# Patient Record
Sex: FEMALE | Race: WHITE | Employment: OTHER | URBAN - METROPOLITAN AREA
[De-identification: names, ages, dates, MRNs, and addresses within clinical notes are randomized per-mention and may not be internally consistent; named-entity substitution may affect disease eponyms.]

---

## 2018-09-25 ENCOUNTER — OFFICE VISIT (OUTPATIENT)
Dept: FAMILY MEDICINE CLINIC | Facility: CLINIC | Age: 63
End: 2018-09-25
Payer: COMMERCIAL

## 2018-09-25 ENCOUNTER — LAB ENCOUNTER (OUTPATIENT)
Dept: LAB | Facility: REFERENCE LAB | Age: 63
End: 2018-09-25
Attending: FAMILY MEDICINE
Payer: COMMERCIAL

## 2018-09-25 VITALS
WEIGHT: 109 LBS | OXYGEN SATURATION: 96 % | HEART RATE: 66 BPM | DIASTOLIC BLOOD PRESSURE: 60 MMHG | BODY MASS INDEX: 19.56 KG/M2 | HEIGHT: 62.5 IN | SYSTOLIC BLOOD PRESSURE: 88 MMHG

## 2018-09-25 DIAGNOSIS — M54.6 CHRONIC THORACIC BACK PAIN, UNSPECIFIED BACK PAIN LATERALITY: ICD-10-CM

## 2018-09-25 DIAGNOSIS — Z00.00 ROUTINE MEDICAL EXAM: Primary | ICD-10-CM

## 2018-09-25 DIAGNOSIS — Z00.00 ROUTINE MEDICAL EXAM: ICD-10-CM

## 2018-09-25 DIAGNOSIS — G89.29 CHRONIC THORACIC BACK PAIN, UNSPECIFIED BACK PAIN LATERALITY: ICD-10-CM

## 2018-09-25 DIAGNOSIS — D17.22 LIPOMA OF LEFT UPPER EXTREMITY: ICD-10-CM

## 2018-09-25 DIAGNOSIS — Z78.0 POSTMENOPAUSAL: ICD-10-CM

## 2018-09-25 DIAGNOSIS — D17.24 LIPOMA OF LEFT LOWER EXTREMITY: ICD-10-CM

## 2018-09-25 DIAGNOSIS — R39.15 URINARY URGENCY: ICD-10-CM

## 2018-09-25 DIAGNOSIS — M25.472 EDEMA OF LEFT ANKLE: ICD-10-CM

## 2018-09-25 DIAGNOSIS — R42 DIZZINESS: ICD-10-CM

## 2018-09-25 LAB
ALBUMIN SERPL BCP-MCNC: 4.7 G/DL (ref 3.5–4.8)
ALBUMIN/GLOB SERPL: 1.7 {RATIO} (ref 1–2)
ALP SERPL-CCNC: 43 U/L (ref 32–100)
ALT SERPL-CCNC: 18 U/L (ref 14–54)
ANION GAP SERPL CALC-SCNC: 6 MMOL/L (ref 0–18)
AST SERPL-CCNC: 21 U/L (ref 15–41)
BASOPHILS # BLD: 0.1 K/UL (ref 0–0.2)
BASOPHILS NFR BLD: 1 %
BILIRUB SERPL-MCNC: 0.7 MG/DL (ref 0.3–1.2)
BUN SERPL-MCNC: 10 MG/DL (ref 8–20)
BUN/CREAT SERPL: 22.2 (ref 10–20)
CALCIUM SERPL-MCNC: 9.8 MG/DL (ref 8.5–10.5)
CHLORIDE SERPL-SCNC: 105 MMOL/L (ref 95–110)
CHOLEST SERPL-MCNC: 213 MG/DL (ref 110–200)
CO2 SERPL-SCNC: 29 MMOL/L (ref 22–32)
CREAT SERPL-MCNC: 0.45 MG/DL (ref 0.5–1.5)
EOSINOPHIL # BLD: 0.1 K/UL (ref 0–0.7)
EOSINOPHIL NFR BLD: 2 %
ERYTHROCYTE [DISTWIDTH] IN BLOOD BY AUTOMATED COUNT: 13.2 % (ref 11–15)
GLOBULIN PLAS-MCNC: 2.7 G/DL (ref 2.5–3.7)
GLUCOSE SERPL-MCNC: 88 MG/DL (ref 70–99)
HCT VFR BLD AUTO: 39.4 % (ref 35–48)
HDLC SERPL-MCNC: 93 MG/DL
HGB BLD-MCNC: 13.3 G/DL (ref 12–16)
LDLC SERPL CALC-MCNC: 102 MG/DL (ref 0–99)
LYMPHOCYTES # BLD: 1.9 K/UL (ref 1–4)
LYMPHOCYTES NFR BLD: 42 %
MCH RBC QN AUTO: 31.2 PG (ref 27–32)
MCHC RBC AUTO-ENTMCNC: 33.6 G/DL (ref 32–37)
MCV RBC AUTO: 92.9 FL (ref 80–100)
MONOCYTES # BLD: 0.4 K/UL (ref 0–1)
MONOCYTES NFR BLD: 10 %
NEUTROPHILS # BLD AUTO: 2.1 K/UL (ref 1.8–7.7)
NEUTROPHILS NFR BLD: 45 %
NONHDLC SERPL-MCNC: 120 MG/DL
OSMOLALITY UR CALC.SUM OF ELEC: 288 MOSM/KG (ref 275–295)
PATIENT FASTING: YES
PLATELET # BLD AUTO: 231 K/UL (ref 140–400)
PMV BLD AUTO: 10.1 FL (ref 7.4–10.3)
POTASSIUM SERPL-SCNC: 5.2 MMOL/L (ref 3.3–5.1)
PROT SERPL-MCNC: 7.4 G/DL (ref 5.9–8.4)
RBC # BLD AUTO: 4.25 M/UL (ref 3.7–5.4)
SODIUM SERPL-SCNC: 140 MMOL/L (ref 136–144)
TRIGL SERPL-MCNC: 88 MG/DL (ref 1–149)
TSH SERPL-ACNC: 1.2 UIU/ML (ref 0.45–5.33)
WBC # BLD AUTO: 4.6 K/UL (ref 4–11)

## 2018-09-25 PROCEDURE — 80050 GENERAL HEALTH PANEL: CPT | Performed by: FAMILY MEDICINE

## 2018-09-25 PROCEDURE — 36415 COLL VENOUS BLD VENIPUNCTURE: CPT | Performed by: FAMILY MEDICINE

## 2018-09-25 PROCEDURE — 99386 PREV VISIT NEW AGE 40-64: CPT | Performed by: FAMILY MEDICINE

## 2018-09-25 PROCEDURE — 80061 LIPID PANEL: CPT | Performed by: FAMILY MEDICINE

## 2018-09-25 PROCEDURE — 83036 HEMOGLOBIN GLYCOSYLATED A1C: CPT | Performed by: FAMILY MEDICINE

## 2018-09-25 RX ORDER — ESTRADIOL 0.1 MG/G
2 CREAM VAGINAL
COMMUNITY
Start: 2017-09-19

## 2018-09-25 NOTE — PROGRESS NOTES
Jasmine Sampson is a 66year old female. Patient presents with:  Physical: questions/ lump on left arm       HPI:     Found me online  Had seen Dr. Alcides Rapp and another doc Looking for a natural approach.    Growth on left upper arm  Also on left la • Other (Other) Father    • Heart Disorder Mother    • Diabetes Mother    • Other (Other) Mother    • Heart Disorder Maternal Grandfather    • Other (Other) Paternal Grandfather        IMMUNIZATION HISTORY:     There is no immunization history on file for Physical Exam   Constitutional: She is oriented to person, place, and time and well-developed, well-nourished, and in no distress. No distress. HENT:   Head: Normocephalic and atraumatic.    Right Ear: External ear normal.   Left Ear: External ear normal. Patient was referred for pelvic floor therapy for further management of symptoms. DEXA scan ordered to evaluate bone density    Given further recommendations as below.     Orders Placed This Visit:  No orders of the defined types were placed in this encoun Discussed with Patient the Following:  · Monthly breast self-exam  · Breast cancer screening/ mammograms and clinical breast exams  · Cervical cancer screening/ pap smears  · Healthy diet including adequate intake of vegetables and fruits, appropriate port

## 2018-09-26 ENCOUNTER — TELEPHONE (OUTPATIENT)
Dept: FAMILY MEDICINE CLINIC | Facility: CLINIC | Age: 63
End: 2018-09-26

## 2018-09-26 LAB — HBA1C MFR BLD: 5.3 % (ref 4–6)

## 2018-10-15 ENCOUNTER — HOSPITAL ENCOUNTER (OUTPATIENT)
Dept: BONE DENSITY | Facility: HOSPITAL | Age: 63
Discharge: HOME | End: 2018-10-15
Attending: FAMILY MEDICINE
Payer: COMMERCIAL

## 2018-10-15 DIAGNOSIS — Z78.0 POSTMENOPAUSAL: ICD-10-CM

## 2018-10-15 DIAGNOSIS — Z00.00 ROUTINE MEDICAL EXAM: ICD-10-CM

## 2018-10-15 PROCEDURE — 77080 DXA BONE DENSITY AXIAL: CPT | Performed by: FAMILY MEDICINE

## 2018-12-04 ENCOUNTER — OFFICE VISIT (OUTPATIENT)
Dept: FAMILY MEDICINE CLINIC | Facility: CLINIC | Age: 63
End: 2018-12-04
Payer: COMMERCIAL

## 2018-12-04 VITALS
WEIGHT: 108 LBS | DIASTOLIC BLOOD PRESSURE: 76 MMHG | BODY MASS INDEX: 19.38 KG/M2 | HEART RATE: 80 BPM | HEIGHT: 62.5 IN | SYSTOLIC BLOOD PRESSURE: 98 MMHG | OXYGEN SATURATION: 98 %

## 2018-12-04 DIAGNOSIS — R68.89 COLD INTOLERANCE OF HAND: ICD-10-CM

## 2018-12-04 DIAGNOSIS — M54.9 CHRONIC NECK AND BACK PAIN: Primary | ICD-10-CM

## 2018-12-04 DIAGNOSIS — R09.82 PND (POST-NASAL DRIP): ICD-10-CM

## 2018-12-04 DIAGNOSIS — M54.2 CHRONIC NECK AND BACK PAIN: Primary | ICD-10-CM

## 2018-12-04 DIAGNOSIS — G89.29 CHRONIC NECK AND BACK PAIN: Primary | ICD-10-CM

## 2018-12-04 DIAGNOSIS — R53.82 CHRONIC FATIGUE: ICD-10-CM

## 2018-12-04 PROCEDURE — 99214 OFFICE O/P EST MOD 30 MIN: CPT | Performed by: FAMILY MEDICINE

## 2018-12-04 NOTE — PROGRESS NOTES
Js Mcdaniel is a 66year old female. Patient presents with:  Pain: kneck right pain is worse, but today ok       HPI:     Work has been busy - understaffed, a recent move. Having continued neck pain off and on. Doesn't know what is going on.    Feel Substance and Sexual Activity      Alcohol use: Yes        Comment: occ      Drug use: No      Sexual activity: Not on file    Other Topics      Concerns:        Caffeine Concern: Not Asked        Exercise: Yes        Seat Belt: Yes        Special Diet: - REVERSE T3, SERUM; Future    4. PND (post-nasal drip)    Patient was referred for physical therapy to manage symptoms further. Further labs ordered as above. Evaluate for marker for dysbiosis. Ferritin level ordered.  Low ferritin can impact energy le The products and items listed below (the “Products”)  and their claims have not been evaluated by the Food and Drug Administration. Dietary products are not intended to treat, prevent, mitigate or cure disease.  Ultimately, you must draw your own conclusion - multiple websites online: [employer] Bio-d-mulsion Forte (liquid)    I recommend taking 2899-3402 IU daily ongoing    Coral Legend Plus by Ho Goncalves  Why: For healthy skin, nails and hair  Directions: 2 capsules twice daily    Where: h

## 2018-12-04 NOTE — PATIENT INSTRUCTIONS
The products and items listed below (the “Products”)  and their claims have not been evaluated by the Food and Drug Administration. Dietary products are not intended to treat, prevent, mitigate or cure disease.  Ultimately, you must draw your own conclusion Bio-d-mulsion Forte (liquid)    I recommend taking 7812-4898 IU daily ongoing    Coral Legend Plus by Ho Goncalves  Why: For healthy skin, nails and hair  Directions: 2 capsules twice daily    Where: [email or web address]-legend-[email or web address]

## 2018-12-05 ENCOUNTER — APPOINTMENT (OUTPATIENT)
Dept: LAB | Facility: REFERENCE LAB | Age: 63
End: 2018-12-05
Attending: FAMILY MEDICINE
Payer: COMMERCIAL

## 2018-12-05 DIAGNOSIS — R68.89 COLD INTOLERANCE OF HAND: ICD-10-CM

## 2018-12-05 DIAGNOSIS — R53.82 CHRONIC FATIGUE: ICD-10-CM

## 2018-12-05 PROCEDURE — 84481 FREE ASSAY (FT-3): CPT | Performed by: FAMILY MEDICINE

## 2018-12-05 PROCEDURE — 84482 T3 REVERSE: CPT | Performed by: FAMILY MEDICINE

## 2018-12-05 PROCEDURE — 86376 MICROSOMAL ANTIBODY EACH: CPT | Performed by: FAMILY MEDICINE

## 2018-12-05 PROCEDURE — 86628 CANDIDA ANTIBODY: CPT | Performed by: FAMILY MEDICINE

## 2018-12-05 PROCEDURE — 82728 ASSAY OF FERRITIN: CPT | Performed by: FAMILY MEDICINE

## 2018-12-05 PROCEDURE — 84439 ASSAY OF FREE THYROXINE: CPT | Performed by: FAMILY MEDICINE

## 2018-12-05 PROCEDURE — 86800 THYROGLOBULIN ANTIBODY: CPT | Performed by: FAMILY MEDICINE

## 2018-12-05 PROCEDURE — 36415 COLL VENOUS BLD VENIPUNCTURE: CPT | Performed by: FAMILY MEDICINE

## 2018-12-06 ENCOUNTER — NURSE ONLY (OUTPATIENT)
Dept: FAMILY MEDICINE CLINIC | Facility: CLINIC | Age: 63
End: 2018-12-06

## 2018-12-18 ENCOUNTER — OFFICE VISIT (OUTPATIENT)
Dept: PHYSICAL THERAPY | Age: 63
End: 2018-12-18
Attending: FAMILY MEDICINE
Payer: COMMERCIAL

## 2018-12-18 DIAGNOSIS — M54.9 CHRONIC NECK AND BACK PAIN: ICD-10-CM

## 2018-12-18 DIAGNOSIS — M54.2 CHRONIC NECK AND BACK PAIN: ICD-10-CM

## 2018-12-18 DIAGNOSIS — G89.29 CHRONIC NECK AND BACK PAIN: ICD-10-CM

## 2018-12-18 PROCEDURE — 97530 THERAPEUTIC ACTIVITIES: CPT | Performed by: PHYSICAL THERAPIST

## 2018-12-18 PROCEDURE — 97112 NEUROMUSCULAR REEDUCATION: CPT | Performed by: PHYSICAL THERAPIST

## 2018-12-18 PROCEDURE — 97161 PT EVAL LOW COMPLEX 20 MIN: CPT | Performed by: PHYSICAL THERAPIST

## 2018-12-18 NOTE — PROGRESS NOTES
CERVICAL SPINE/UPPER EXTREMITY EVALUATION:   Referring Physician: Wiliam Marrufo DO    Diagnosis: Chronic neck and back pain (M54.2,M54.9,G89.29) Date of Service: 12/18/2018   Date of Onset: 1 year ago       SUBJECTIVE:   PATIENT SUMMARY:  Mickie Runner Night Pain, Unexplained Weight Loss, Fever or Chills N   Lower Extremity Neurological Deficit N   Vision Changes/Double Vision N   Headaches Y; when symptomatic   Chest Pain or Palpitations, SOB N   Dizziness, weakness, numbness and tingling Dizziness wi facilitation   Therapeutic Activity  Patient education on anatomy, pathophysiology, plan of care     Discussed findings with patient.  Provided patient education on: anatomy, pathophysiology, plan of care, therapy progression and home exercise program.    C [phone]. Sincerely,  Electronically signed by: Mason Hu PT, DPT, FAAOMPT, OCS          Physician’s certification required: Yes  I certify the need for these services furnished under this plan of treatment and while under my care.     X______

## 2018-12-24 ENCOUNTER — OFFICE VISIT (OUTPATIENT)
Dept: PHYSICAL THERAPY | Age: 63
End: 2018-12-24
Attending: FAMILY MEDICINE
Payer: COMMERCIAL

## 2018-12-24 PROCEDURE — 97112 NEUROMUSCULAR REEDUCATION: CPT | Performed by: PHYSICAL THERAPIST

## 2018-12-24 PROCEDURE — 97530 THERAPEUTIC ACTIVITIES: CPT | Performed by: PHYSICAL THERAPIST

## 2018-12-24 PROCEDURE — 97140 MANUAL THERAPY 1/> REGIONS: CPT | Performed by: PHYSICAL THERAPIST

## 2018-12-24 NOTE — PROGRESS NOTES
Name: Constance Dunlap  Date: 12/24/2018  Dx: Chronic neck and back pain (M54.2,M54.9,G89.29)           Authorized # of Visits:  12         Next MD visit: none scheduled  Fall Risk: standard         Precautions: n/a           Medication Changes since last v return patient to prior level of function.       Charges: 1 TA, 1 NR, 1 MT       Total Timed Treatment: 45 min  Total Treatment Time: 45 min

## 2018-12-31 ENCOUNTER — OFFICE VISIT (OUTPATIENT)
Dept: PHYSICAL THERAPY | Age: 63
End: 2018-12-31
Attending: FAMILY MEDICINE
Payer: COMMERCIAL

## 2018-12-31 PROCEDURE — 97140 MANUAL THERAPY 1/> REGIONS: CPT | Performed by: PHYSICAL THERAPIST

## 2018-12-31 PROCEDURE — 97112 NEUROMUSCULAR REEDUCATION: CPT | Performed by: PHYSICAL THERAPIST

## 2018-12-31 NOTE — PROGRESS NOTES
Name: Ronak Trejo  Date: 12/31/2018  Dx: Chronic neck and back pain (M54.2,M54.9,G89.29)           Authorized # of Visits:  12         Next MD visit: none scheduled  Fall Risk: standard         Precautions: n/a           Medication Changes since last v Patient will demonstrate appropriate posture and body mechanics for lifting and carrying boxes or groceries <10 pounds. 4. Patient to improve FOTO outcomes score to less than or equal to 20% impairment, for functional improvement in ADLs.  Currently 46% im

## 2019-01-03 ENCOUNTER — OFFICE VISIT (OUTPATIENT)
Dept: PHYSICAL THERAPY | Age: 64
End: 2019-01-03
Attending: FAMILY MEDICINE
Payer: COMMERCIAL

## 2019-01-03 PROCEDURE — 97112 NEUROMUSCULAR REEDUCATION: CPT | Performed by: PHYSICAL THERAPIST

## 2019-01-03 PROCEDURE — 97140 MANUAL THERAPY 1/> REGIONS: CPT | Performed by: PHYSICAL THERAPIST

## 2019-01-03 NOTE — PROGRESS NOTES
Name: Darlin Proud  Date: 1/3/2019   Dx: Chronic neck and back pain (M54.2,M54.9,G89.29)           Authorized # of Visits:  12         Next MD visit: none scheduled  Fall Risk: standard         Precautions: n/a           Medication Changes since last vi aide with symptom management and return to previous level of function.    2. Patient will demonstrate posture correction with ears, shoulders and hips aligned for improved spine position with ADLs and occupational tasks.   3. Patient will demonstrate approp

## 2019-01-07 ENCOUNTER — OFFICE VISIT (OUTPATIENT)
Dept: PHYSICAL THERAPY | Age: 64
End: 2019-01-07
Attending: FAMILY MEDICINE
Payer: COMMERCIAL

## 2019-01-07 PROCEDURE — 97140 MANUAL THERAPY 1/> REGIONS: CPT | Performed by: PHYSICAL THERAPIST

## 2019-01-07 PROCEDURE — 97112 NEUROMUSCULAR REEDUCATION: CPT | Performed by: PHYSICAL THERAPIST

## 2019-01-07 NOTE — PROGRESS NOTES
Name: Cordell Gist  Date: 1/7/2019   Dx: Chronic neck and back pain (M54.2,M54.9,G89.29)           Authorized # of Visits:  12         Next MD visit: none scheduled  Fall Risk: standard         Precautions: n/a           Medication Changes since last vi continues to require mild cues for posture and shoulder position, including cues to decrease trunk extension. Patient was successful with cues and exercises at mirror. Patient was able to demonstrate components of HEP with modifications.  Patient verbalized

## 2019-01-10 ENCOUNTER — OFFICE VISIT (OUTPATIENT)
Dept: PHYSICAL THERAPY | Age: 64
End: 2019-01-10
Attending: FAMILY MEDICINE
Payer: COMMERCIAL

## 2019-01-10 PROCEDURE — 97530 THERAPEUTIC ACTIVITIES: CPT | Performed by: PHYSICAL THERAPIST

## 2019-01-10 PROCEDURE — 97112 NEUROMUSCULAR REEDUCATION: CPT | Performed by: PHYSICAL THERAPIST

## 2019-01-10 NOTE — PROGRESS NOTES
Name: Elda Snyder  Date: 1/10/2019   Dx: Chronic neck and back pain (M54.2,M54.9,G89.29)           Authorized # of Visits:  12         Next MD visit: none scheduled  Fall Risk: standard         Precautions: n/a           Medication Changes since last v spine and scapulae. Added scapular stability/mobility exercises. Patient was successful with cues, but required mild range modifications due to discomfort. Patient was able to demonstrate components of HEP with modifications for success.      Goals:   Long

## 2019-01-14 ENCOUNTER — OFFICE VISIT (OUTPATIENT)
Dept: PHYSICAL THERAPY | Age: 64
End: 2019-01-14
Attending: FAMILY MEDICINE
Payer: COMMERCIAL

## 2019-01-14 PROCEDURE — 97112 NEUROMUSCULAR REEDUCATION: CPT | Performed by: PHYSICAL THERAPIST

## 2019-01-14 PROCEDURE — 97140 MANUAL THERAPY 1/> REGIONS: CPT | Performed by: PHYSICAL THERAPIST

## 2019-01-14 NOTE — PROGRESS NOTES
Name: Marisa Credit  Date: 1/14/2019   Dx: Chronic neck and back pain (M54.2,M54.9,G89.29)           Authorized # of Visits:  12         Next MD visit: none scheduled  Fall Risk: standard         Precautions: n/a           Medication Changes since last v Assessment: Focused on interventions to restore cervical spine and scapular mechanics. Provided interventions to improve spine and scapular mechanics, reviewed posture training and benefits. Patient was successful with cues.  Patient was able to [facility]

## 2019-01-16 ENCOUNTER — OFFICE VISIT (OUTPATIENT)
Dept: FAMILY MEDICINE CLINIC | Facility: CLINIC | Age: 64
End: 2019-01-16
Payer: COMMERCIAL

## 2019-01-16 VITALS
DIASTOLIC BLOOD PRESSURE: 58 MMHG | WEIGHT: 109 LBS | HEIGHT: 62.5 IN | BODY MASS INDEX: 19.56 KG/M2 | OXYGEN SATURATION: 98 % | HEART RATE: 70 BPM | SYSTOLIC BLOOD PRESSURE: 92 MMHG

## 2019-01-16 DIAGNOSIS — R68.89 COLD INTOLERANCE OF HAND: ICD-10-CM

## 2019-01-16 DIAGNOSIS — M54.2 CHRONIC NECK AND BACK PAIN: Primary | ICD-10-CM

## 2019-01-16 DIAGNOSIS — R53.82 CHRONIC FATIGUE: ICD-10-CM

## 2019-01-16 DIAGNOSIS — R09.82 PND (POST-NASAL DRIP): ICD-10-CM

## 2019-01-16 DIAGNOSIS — G89.29 CHRONIC NECK AND BACK PAIN: Primary | ICD-10-CM

## 2019-01-16 DIAGNOSIS — M54.9 CHRONIC NECK AND BACK PAIN: Primary | ICD-10-CM

## 2019-01-16 PROCEDURE — 99214 OFFICE O/P EST MOD 30 MIN: CPT | Performed by: FAMILY MEDICINE

## 2019-01-16 NOTE — PATIENT INSTRUCTIONS
The products and items listed below (the “Products”)  and their claims have not been evaluated by the Food and Drug Administration. Dietary products are not intended to treat, prevent, mitigate or cure disease.  Ultimately, you must draw your own conclusion daily    L-Carnitine by Ensley Byes    Directions: 2 capsules twice daily  Where: [employer] or [employer]    Omega 3 fatty acids are anti-inflammatory and important for brain and nervous system health, including memory. I recommend taking 2000 mg daily. sauce  Horseradish  Ketchup  Mayonnaise  Soy sauce  White vinegar  REFINED/PROCESSED FATS & OILS  Canola oil  Fake ‘butter’ spreads  Margarine  Soybean oil  Sunflower oil  SUGARS & SUGAR SUBSTITUTES  Agave  Aspartame  Cane sugar  Corn syrup  Honey  Maple s and pseudo-grains like buckwheat, millet, and quinoa contain high amounts of fiber, excellent for keeping your digestive system moving and eliminating Candida toxins. These are great substitutes when your recipes call for wheat, rye, or rice.   Some product spices have antioxidant and antifungal properties. They can also improve circulation and reduce inflammation. They’re great for livening up food if you’re on a Candida diet. Coconut aminos are an excellent alternative to soy sauce.  And you can use apple c be minimized or eliminated from your diet at first, but can be included in small amounts as you progress through your treatment.   Fruits  These fruits have low glycemic loads and are much less likely to spike your blood sugar than foods like bananas or gra fruits dramatically increases their sweetness – just look at how much sweeter raisins are compared to grapes. Another key fruit product to avoid is fruit juice.  Without the natural fiber that slows down your absorption of sugar, fruit juices have an almos source may be OK, but we would recommend avoiding it during your Candida diet. Processed meats like lunch meat and spam are loaded with dextrose, nitrates, sulfates, and sugars.  These can worsen a gut imbalance like Candida, weaken your immune system, str from your diet until you have recovered from your Candida overgrowth. If you do choose to use them in a recipe, make sure they are soaked overnight or sprayed with Grapefruit Seed Extract before you use them.  Also remember to avoid nut butters made from th overgrowth. Be careful with artificial sweeteners like aspartame too. It can weaken your immune system and, in some studies, aspartame has been shown to raise blood glucose just like sugar. Not to mention that it might a carcinogen too.   There are healthi Choice)  • Nuts and seeds (almonds, walnuts, pecans, macadamia nuts and pumpkin seeds)  • Nut butter packets (almond, pecan, macadamia nuts—[employer] makes individual packs)  • Coconut butter packets ([employer] brand is great)   • Whole food or raw food prot

## 2019-01-16 NOTE — PROGRESS NOTES
Luis Grayson is a 60year old female. Patient presents with: Follow - Up: physical therapy, and labs       HPI:     Has been working with PT- has been seeing improvements. Current therapist is leaving the practice.    Tightness across the throat is b Substance and Sexual Activity      Alcohol use: Yes        Comment: occ      Drug use: No      Sexual activity: Not on file    Other Topics      Concerns:        Caffeine Concern: Not Asked        Exercise: Yes        Seat Belt: Yes        Special Diet: The products and items listed below (the “Products”)  and their claims have not been evaluated by the Food and Drug Administration. Dietary products are not intended to treat, prevent, mitigate or cure disease.  Ultimately, you must draw your own conclusion Directions: 1 capsule daily    L-Carnitine by Katharina Jimenez    Directions: 2 capsules twice daily  Where: Whole Foods or [employer]    Omega 3 fatty acids are anti-inflammatory and important for brain and nervous system health, including memory.  I recommend t Barbecue sauce  Horseradish  Ketchup  Mayonnaise  Soy sauce  White vinegar  REFINED/PROCESSED FATS & OILS  Canola oil  Fake ‘butter’ spreads  Margarine  Soybean oil  Sunflower oil  SUGARS & SUGAR SUBSTITUTES  Agave  Aspartame  Cane sugar  Corn syrup  Honey Grains and pseudo-grains like buckwheat, millet, and quinoa contain high amounts of fiber, excellent for keeping your digestive system moving and eliminating Candida toxins. These are great substitutes when your recipes call for wheat, rye, or rice.   Some Many herbs and spices have antioxidant and antifungal properties. They can also improve circulation and reduce inflammation. They’re great for livening up food if you’re on a Candida diet. Coconut aminos are an excellent alternative to soy sauce.  And you These include the more starchy vegetables like carrots, beans, and potatoes. These should be minimized or eliminated from your diet at first, but can be included in small amounts as you progress through your treatment.   Fruits  These fruits have low glycem It’s particularly important to avoid dried fruits like raisins or dried cranberries. The act of drying fruits dramatically increases their sweetness – just look at how much sweeter raisins are compared to grapes.   Another key fruit product to avoid is frui Pork contains retroviruses and parasites that may survive cooking and be harmful for those with a weakened digestive system. Also remember that pork often comes in an over-cooked form (i.e. bobby!) that is full of carcinogenic compounds.  Properly cooked po This group of nuts contains high amounts of mold, which can potentially irritate your gut. Candida sufferers also tend to have a higher sensitivity to mold, which can lead to inflammation and an immune reaction (just like mold in your home).   The nuts that Olive oil is a great choice, and an antifungal food too, but be aware that there are lots of fake olive oils out there. You might buy your olive oil in a reputable store, but that doesn’t necessarily mean that it’s 100% olive oil.  Other, cheaper oils like Drinking large amounts of alcohol can lead to a temporary drop in your blood sugar, something that many people don’t realize. On the other hand, drinking moderate amounts of alcohol tends to increase your blood sugar.  Either way, destabilizing your blood s

## 2019-01-17 ENCOUNTER — OFFICE VISIT (OUTPATIENT)
Dept: PHYSICAL THERAPY | Age: 64
End: 2019-01-17
Attending: FAMILY MEDICINE
Payer: COMMERCIAL

## 2019-01-17 PROCEDURE — 97112 NEUROMUSCULAR REEDUCATION: CPT | Performed by: PHYSICAL THERAPIST

## 2019-01-17 PROCEDURE — 97140 MANUAL THERAPY 1/> REGIONS: CPT | Performed by: PHYSICAL THERAPIST

## 2019-01-17 NOTE — PROGRESS NOTES
PHYSICAL THERAPY DISCHARGE REPORT  Name: Lalita Haney  Date: 1/17/2019   Dx: Chronic neck and back pain (M54.2,M54.9,G89.29)           Authorized # of Visits:  12         Next MD visit: none scheduled  Fall Risk: standard         Precautions: n/a rotation   - DCF chin tucks by patient with hold  - DCF facilitation in quadruped with developmental sequence  - quadruped hand heel rocking 2x10  - telescoping arms 2x8  - arm circles 2x8  - Reviewed posture training - DCF chin tucks by patient with hold

## 2019-03-20 ENCOUNTER — OFFICE VISIT (OUTPATIENT)
Dept: FAMILY MEDICINE CLINIC | Facility: CLINIC | Age: 64
End: 2019-03-20
Payer: COMMERCIAL

## 2019-03-20 VITALS
DIASTOLIC BLOOD PRESSURE: 60 MMHG | WEIGHT: 108 LBS | HEART RATE: 72 BPM | OXYGEN SATURATION: 92 % | HEIGHT: 62.5 IN | BODY MASS INDEX: 19.38 KG/M2 | SYSTOLIC BLOOD PRESSURE: 80 MMHG

## 2019-03-20 DIAGNOSIS — R53.82 CHRONIC FATIGUE: Primary | ICD-10-CM

## 2019-03-20 DIAGNOSIS — R42 DIZZINESS: ICD-10-CM

## 2019-03-20 DIAGNOSIS — B37.9 CANDIDIASIS: ICD-10-CM

## 2019-03-20 DIAGNOSIS — Z78.0 POSTMENOPAUSAL: ICD-10-CM

## 2019-03-20 DIAGNOSIS — R68.89 COLD INTOLERANCE OF HAND: ICD-10-CM

## 2019-03-20 PROCEDURE — 99214 OFFICE O/P EST MOD 30 MIN: CPT | Performed by: FAMILY MEDICINE

## 2019-03-20 NOTE — PROGRESS NOTES
Jens Landeros is a 60year old female. Patient presents with: Follow - Up: wants labs, wants to know about vacanations       HPI:     Started the recommendations from last time. Drinking a vegan protein shake. Having meat some times to help.    Brai Smoking status: Never Smoker      Smokeless tobacco: Never Used    Substance and Sexual Activity      Alcohol use: Yes        Comment: occ      Drug use: No      Sexual activity: Not on file    Lifestyle      Physical activity:        Days per week: No Neurological: She is alert and oriented to person, place, and time. No cranial nerve deficit. Gait normal.   Skin: Skin is warm and dry. Psychiatric: Affect normal.       ASSESSMENT AND PLAN:       1. Chronic fatigue    2. Cold intolerance of hand    3. Increase sour, salty, and pungent taste      Return in about 3 months (around 6/20/2019) for Integrative Medicine - Established (30 min). Patient affirmed understanding of plan and all questions were answered.      Loree Ramirez, DO

## 2019-06-11 ENCOUNTER — OFFICE VISIT (OUTPATIENT)
Dept: FAMILY MEDICINE CLINIC | Facility: CLINIC | Age: 64
End: 2019-06-11
Payer: COMMERCIAL

## 2019-06-11 ENCOUNTER — TELEPHONE (OUTPATIENT)
Dept: FAMILY MEDICINE CLINIC | Facility: CLINIC | Age: 64
End: 2019-06-11

## 2019-06-11 VITALS
BODY MASS INDEX: 18.66 KG/M2 | OXYGEN SATURATION: 99 % | HEIGHT: 62.5 IN | WEIGHT: 104 LBS | SYSTOLIC BLOOD PRESSURE: 100 MMHG | HEART RATE: 66 BPM | DIASTOLIC BLOOD PRESSURE: 60 MMHG

## 2019-06-11 DIAGNOSIS — M54.12 CERVICAL RADICULAR PAIN: ICD-10-CM

## 2019-06-11 DIAGNOSIS — S29.012A UPPER BACK STRAIN, INITIAL ENCOUNTER: Primary | ICD-10-CM

## 2019-06-11 PROCEDURE — 99213 OFFICE O/P EST LOW 20 MIN: CPT | Performed by: FAMILY MEDICINE

## 2019-06-11 RX ORDER — BACLOFEN 10 MG/1
10 TABLET ORAL 3 TIMES DAILY PRN
Qty: 30 TABLET | Refills: 0 | Status: SHIPPED | OUTPATIENT
Start: 2019-06-11 | End: 2019-10-01

## 2019-06-11 NOTE — TELEPHONE ENCOUNTER
Patient requesting an call back in regard to having pain an arm and shoulder states having tingling in arm and hand in fingers (left)

## 2019-06-11 NOTE — PROGRESS NOTES
Cordell Sanchez is a 60year old female. Patient presents with:  Arm Pain: c/o left arm pain.    Shoulder Pain: c/o left shoulder pain with numbness      HPI:     Was reaching for a kleenex with her left arm and then started to get a cold sensation into he Tobacco Use      Smoking status: Never Smoker      Smokeless tobacco: Never Used    Substance and Sexual Activity      Alcohol use: Yes        Comment: occ      Drug use: No      Sexual activity: Not on file    Lifestyle      Physical activity:        Da Restriction of upper back motion with TTP and reproduction of tingling with palpation. Neurological: She is alert and oriented to person, place, and time. No cranial nerve deficit. Gait normal.   Skin: Skin is warm and dry.    Psychiatric: Affect normal. Where: Whole [employer] or [employer]        Return in about 6 weeks (around 7/23/2019) for Follow Up (15 min). Patient affirmed understanding of plan and all questions were answered.      Mac Morin, DO

## 2019-06-18 ENCOUNTER — OFFICE VISIT (OUTPATIENT)
Dept: FAMILY MEDICINE CLINIC | Facility: CLINIC | Age: 64
End: 2019-06-18
Payer: COMMERCIAL

## 2019-06-18 VITALS
BODY MASS INDEX: 18.66 KG/M2 | DIASTOLIC BLOOD PRESSURE: 60 MMHG | SYSTOLIC BLOOD PRESSURE: 90 MMHG | HEART RATE: 81 BPM | WEIGHT: 104 LBS | HEIGHT: 62.5 IN | OXYGEN SATURATION: 99 %

## 2019-06-18 DIAGNOSIS — Z78.0 POSTMENOPAUSAL: ICD-10-CM

## 2019-06-18 DIAGNOSIS — S29.012A UPPER BACK STRAIN, INITIAL ENCOUNTER: ICD-10-CM

## 2019-06-18 DIAGNOSIS — M54.12 CERVICAL RADICULAR PAIN: ICD-10-CM

## 2019-06-18 DIAGNOSIS — B37.9 CANDIDIASIS: ICD-10-CM

## 2019-06-18 DIAGNOSIS — R53.82 CHRONIC FATIGUE: Primary | ICD-10-CM

## 2019-06-18 DIAGNOSIS — R42 DIZZINESS: ICD-10-CM

## 2019-06-18 DIAGNOSIS — R68.89 COLD INTOLERANCE OF HAND: ICD-10-CM

## 2019-06-18 PROCEDURE — 99214 OFFICE O/P EST MOD 30 MIN: CPT | Performed by: FAMILY MEDICINE

## 2019-06-18 NOTE — PROGRESS NOTES
Army Huizar is a 60year old female. Patient presents with: Integrative Medicine Consult      HPI:     Has been doing HEP  Not using baclofen  Did not start the PT. Still some numbness in her finger.    Hands are still always cold, cold weather woul Inability: Not on file      Transportation needs:        Medical: Not on file        Non-medical: Not on file    Tobacco Use      Smoking status: Never Smoker      Smokeless tobacco: Never Used    Substance and Sexual Activity      Alcohol use:  Yes Musculoskeletal: She exhibits no edema. Neurological: She is alert and oriented to person, place, and time. No cranial nerve deficit. Gait normal.   Skin: Skin is warm and dry. Psychiatric: Affect normal.       ASSESSMENT AND PLAN:       1.  Chronic fat Directions: 1 capsule twice daily  Where: [email or web address]/products/natto-k    2. DESBIO Detox II    Directions: 5 drops under the tongue 3 times daily  [email or web address] [email or web address]          Return in about 3 months (around 9/18/2019).     Patient Aicha He

## 2019-09-11 ENCOUNTER — OFFICE VISIT (OUTPATIENT)
Dept: GASTROENTEROLOGY | Facility: CLINIC | Age: 64
End: 2019-09-11
Payer: COMMERCIAL

## 2019-09-11 VITALS
WEIGHT: 107 LBS | DIASTOLIC BLOOD PRESSURE: 66 MMHG | BODY MASS INDEX: 19.69 KG/M2 | SYSTOLIC BLOOD PRESSURE: 102 MMHG | HEART RATE: 66 BPM | HEIGHT: 62 IN

## 2019-09-11 DIAGNOSIS — Z12.12 SCREENING FOR COLORECTAL CANCER: Primary | ICD-10-CM

## 2019-09-11 DIAGNOSIS — Z12.11 SCREENING FOR COLORECTAL CANCER: Primary | ICD-10-CM

## 2019-09-11 PROCEDURE — 99202 OFFICE O/P NEW SF 15 MIN: CPT | Performed by: INTERNAL MEDICINE

## 2019-09-11 RX ORDER — KRILL/OM-3/DHA/EPA/PHOSPHO/AST 500-110 MG
CAPSULE ORAL DAILY
COMMUNITY
End: 2019-10-01

## 2019-09-11 NOTE — PATIENT INSTRUCTIONS
Schedule screening colonoscopy following a split dose Suprep and either IV sedation or monitored anesthesia care per scheduling.

## 2019-09-12 ENCOUNTER — TELEPHONE (OUTPATIENT)
Dept: GASTROENTEROLOGY | Facility: CLINIC | Age: 64
End: 2019-09-12

## 2019-09-12 NOTE — TELEPHONE ENCOUNTER
Scheduled for:  Colonoscopy - 75980  Provider Name:  Dr. Yeny Kendall  Date:  11/8/19  Location:  [facility]  Sedation:  MAC  Time:  (pt is aware that [facility] will call with arrival time)  Prep:  Suprep, Prep instructions were given to pt in the office, pt verbalized [place]

## 2019-09-14 NOTE — PROGRESS NOTES
HPI:    Patient ID: Cordell Sanchez is a 60year old female. HPI  The patient is self referred for a discussion regarding colorectal cancer screening. She had a normal colonoscopy in 2009 at [facility]. The patient feels \"okay\". HENT:   Head: Normocephalic and atraumatic. Mouth/Throat: No oropharyngeal exudate. Eyes: Conjunctivae are normal. No scleral icterus. Neck: Neck supple. No thyromegaly present. Cardiovascular: Normal rate, regular rhythm and normal heart sounds. RDW      11.0 - 15.0 % 13.2   Platelet Count      248 - 400 K/   MEAN PLATELET VOLUME      7.4 - 10.3 fL 10.1   Neutrophils %      % 45   Lymphocytes %      % 42   Monocytes %      % 10   Eosinophils %      % 2   Basophils %      % 1   Neutrophils

## 2019-09-24 ENCOUNTER — OFFICE VISIT (OUTPATIENT)
Dept: INTEGRATIVE MEDICINE | Facility: CLINIC | Age: 64
End: 2019-09-24
Payer: COMMERCIAL

## 2019-09-24 ENCOUNTER — HOSPITAL ENCOUNTER (OUTPATIENT)
Dept: GENERAL RADIOLOGY | Age: 64
Discharge: HOME | End: 2019-09-24
Attending: PHYSICIAN ASSISTANT
Payer: COMMERCIAL

## 2019-09-24 VITALS
BODY MASS INDEX: 19.91 KG/M2 | WEIGHT: 108.19 LBS | SYSTOLIC BLOOD PRESSURE: 100 MMHG | OXYGEN SATURATION: 98 % | TEMPERATURE: 98 F | HEART RATE: 61 BPM | DIASTOLIC BLOOD PRESSURE: 60 MMHG | HEIGHT: 62 IN

## 2019-09-24 DIAGNOSIS — R07.81 RIB PAIN: ICD-10-CM

## 2019-09-24 DIAGNOSIS — M89.8X9 ABNORMAL BONE FORMATION: ICD-10-CM

## 2019-09-24 DIAGNOSIS — R07.81 RIB PAIN: Primary | ICD-10-CM

## 2019-09-24 PROBLEM — Z00.00 ROUTINE MEDICAL EXAM: Status: RESOLVED | Noted: 2018-09-25 | Resolved: 2019-09-24

## 2019-09-24 PROCEDURE — 99214 OFFICE O/P EST MOD 30 MIN: CPT | Performed by: PHYSICIAN ASSISTANT

## 2019-09-24 PROCEDURE — 71046 X-RAY EXAM CHEST 2 VIEWS: CPT | Performed by: PHYSICIAN ASSISTANT

## 2019-09-24 NOTE — PROGRESS NOTES
Pop Wheat is a 60year old female. Patient presents with:  Pain: lump and pain on the L side of chest area. HPI:   Pain in chest which hurt to breath and cough. Taking Advil. Bump on left side of chest. Pain travels to left armpit.  Thought it daily. Disp:  Rfl:    Magnesium 100 MG Oral Tab Take 400 mg by mouth daily. Disp:  Rfl:    B Complex Vitamins (VITAMIN B COMPLEX OR) Inject as directed.  Disp:  Rfl:    Na Sulfate-K Sulfate-Mg Sulf (SUPREP BOWEL PREP KIT) 17.5-3.13-1.6 GM/177ML Oral Solutio Forced sexual activity: Not on file    Other Topics      Concerns:        Caffeine Concern: Not Asked        Exercise: Yes        Seat Belt: Yes        Special Diet: Yes        Stress Concern: Yes        Weight Concern: No    Social History Narrative (CPT=71046); Future      Will do x-ray to rule out fracture. Continue advil PRN for pain. Discussed with patient that if not fracture could be muscle strain or inflammation of costal cartilage. Given further recommendations as below.     Orders Pl

## 2019-09-26 NOTE — PROGRESS NOTES
Herve Clayton,     Your x-ray did not show any fractures. Since there is no fracture, most likely the pain is from muscle strain and/or inflammation of the cartilage around the ribs. Continue Advil as needed for the pain.  The pain should continue to improve ove

## 2019-10-01 ENCOUNTER — APPOINTMENT (OUTPATIENT)
Dept: LAB | Facility: REFERENCE LAB | Age: 64
End: 2019-10-01
Attending: FAMILY MEDICINE
Payer: COMMERCIAL

## 2019-10-01 ENCOUNTER — OFFICE VISIT (OUTPATIENT)
Dept: INTEGRATIVE MEDICINE | Facility: CLINIC | Age: 64
End: 2019-10-01
Payer: COMMERCIAL

## 2019-10-01 ENCOUNTER — HOSPITAL ENCOUNTER (OUTPATIENT)
Dept: GENERAL RADIOLOGY | Age: 64
Discharge: HOME | End: 2019-10-01
Attending: FAMILY MEDICINE
Payer: COMMERCIAL

## 2019-10-01 VITALS
OXYGEN SATURATION: 97 % | HEART RATE: 72 BPM | SYSTOLIC BLOOD PRESSURE: 90 MMHG | WEIGHT: 105.81 LBS | DIASTOLIC BLOOD PRESSURE: 54 MMHG | BODY MASS INDEX: 18.98 KG/M2 | HEIGHT: 62.5 IN

## 2019-10-01 DIAGNOSIS — S29.011D MUSCLE STRAIN OF CHEST WALL, SUBSEQUENT ENCOUNTER: ICD-10-CM

## 2019-10-01 DIAGNOSIS — R07.81 RIB PAIN: Primary | ICD-10-CM

## 2019-10-01 DIAGNOSIS — R20.2 PARESTHESIA OF BILATERAL LEGS: ICD-10-CM

## 2019-10-01 DIAGNOSIS — M89.8X9 BONY GROWTH: ICD-10-CM

## 2019-10-01 DIAGNOSIS — R07.81 RIB PAIN: ICD-10-CM

## 2019-10-01 DIAGNOSIS — H91.92 HEARING LOSS OF LEFT EAR, UNSPECIFIED HEARING LOSS TYPE: ICD-10-CM

## 2019-10-01 PROCEDURE — 82607 VITAMIN B-12: CPT | Performed by: FAMILY MEDICINE

## 2019-10-01 PROCEDURE — 99214 OFFICE O/P EST MOD 30 MIN: CPT | Performed by: FAMILY MEDICINE

## 2019-10-01 PROCEDURE — 71100 X-RAY EXAM RIBS UNI 2 VIEWS: CPT | Performed by: FAMILY MEDICINE

## 2019-10-01 PROCEDURE — 36415 COLL VENOUS BLD VENIPUNCTURE: CPT | Performed by: FAMILY MEDICINE

## 2019-10-01 PROCEDURE — 84207 ASSAY OF VITAMIN B-6: CPT | Performed by: FAMILY MEDICINE

## 2019-10-01 RX ORDER — CHLORAL HYDRATE 500 MG
1 CAPSULE ORAL 2 TIMES DAILY
COMMUNITY
End: 2019-11-26

## 2019-10-01 NOTE — PROGRESS NOTES
Priyanka Gerard is a 60year old female. Patient presents with: Follow - Up: 3 mos - still having L upper chest/rib pain      HPI:     Having Lt upper chest and rib pain for the past several weeks. The pain ebbs and flows.  Has discomfort wearing a seatbe Years of education: Not on file      Highest education level: Not on file    Occupational History      Not on file    Social Needs      Financial resource strain: Not on file      Food insecurity:        Worry: Not on file        Inability: Not on file Constitutional: She is oriented to person, place, and time and well-developed, well-nourished, and in no distress. HENT:   Head: Normocephalic and atraumatic.    Right Ear: External ear normal.   Left Ear: External ear normal.   Eyes: Pupils are equal, ro The products and items listed below (the “Products”)  and their claims have not been evaluated by the Food and Drug Administration. Dietary products are not intended to treat, prevent, mitigate or cure disease.  Ultimately, you must draw your own conclusion

## 2019-11-08 ENCOUNTER — LAB REQUISITION (OUTPATIENT)
Dept: LAB | Facility: HOSPITAL | Age: 64
End: 2019-11-08
Payer: COMMERCIAL

## 2019-11-08 ENCOUNTER — SURGERY CENTER DOCUMENTATION (OUTPATIENT)
Dept: SURGERY | Age: 64
End: 2019-11-08

## 2019-11-08 DIAGNOSIS — Z01.89 ENCOUNTER FOR OTHER SPECIFIED SPECIAL EXAMINATIONS: ICD-10-CM

## 2019-11-08 PROCEDURE — 88305 TISSUE EXAM BY PATHOLOGIST: CPT | Performed by: INTERNAL MEDICINE

## 2019-11-08 PROCEDURE — 45380 COLONOSCOPY AND BIOPSY: CPT | Performed by: INTERNAL MEDICINE

## 2019-11-08 NOTE — PROCEDURES
[address] Endoscopy Report      Date of Procedure:  11/08/19      Preoperative Diagnosis:  Colorectal cancer screening      Postoperative Diagnosis:  Diminutive transverse colon polyp      Procedure:    Colonoscopy with cold biops hyperplastic respectively.         Austen Mota MD  11/8/2019

## 2019-11-19 ENCOUNTER — TELEPHONE (OUTPATIENT)
Dept: GASTROENTEROLOGY | Facility: CLINIC | Age: 64
End: 2019-11-19

## 2019-11-19 NOTE — TELEPHONE ENCOUNTER
Notes recorded by Madi Neves RN on 11/12/2019 at [address] [facility] maintenance updated. Unable to put in a  10 year recall due to new upgrade.   Message printed.  ------    Notes recorded by Christine Martinez MD on 11/11/2019 at 6:59 PM CST  I s

## 2019-11-19 NOTE — TELEPHONE ENCOUNTER
10  Year colonoscopy recall placed in system per Dr. Verduzco Alert   . Colonoscopy done on 11/08/19   . Next due on 11/08/29 . Snapshot updated.

## 2019-11-26 ENCOUNTER — OFFICE VISIT (OUTPATIENT)
Dept: INTEGRATIVE MEDICINE | Facility: CLINIC | Age: 64
End: 2019-11-26
Payer: COMMERCIAL

## 2019-11-26 VITALS
WEIGHT: 106.38 LBS | DIASTOLIC BLOOD PRESSURE: 60 MMHG | HEIGHT: 62.25 IN | BODY MASS INDEX: 19.33 KG/M2 | HEART RATE: 70 BPM | SYSTOLIC BLOOD PRESSURE: 104 MMHG | OXYGEN SATURATION: 99 %

## 2019-11-26 DIAGNOSIS — R39.15 URINARY URGENCY: ICD-10-CM

## 2019-11-26 DIAGNOSIS — Z78.0 POSTMENOPAUSAL: ICD-10-CM

## 2019-11-26 DIAGNOSIS — R79.0 LOW FERRITIN: ICD-10-CM

## 2019-11-26 DIAGNOSIS — L65.9 HAIR LOSS: ICD-10-CM

## 2019-11-26 DIAGNOSIS — Z00.00 ROUTINE MEDICAL EXAM: Primary | ICD-10-CM

## 2019-11-26 PROCEDURE — 99396 PREV VISIT EST AGE 40-64: CPT | Performed by: FAMILY MEDICINE

## 2019-11-26 NOTE — PROGRESS NOTES
Elsie Elliott is a 60year old female. Patient presents with:  Physical      HPI:     Chest pain is feeling good, resolved  Still will get leg swelling. Will feel it in clothes. Get tight. Also with an odd sensation in her legs.    Plans to move in t • Heart Disorder Mother    • Diabetes Mother    • Other (Other) Mother    • Cancer Maternal Grandmother         breast cancer   • Heart Disorder Maternal Grandfather    • Cancer Paternal Grandfather         stomach cancer   • Other (Other) Paternal Nolon Binning Fear of current or ex partner: Not on file        Emotionally abused: Not on file        Physically abused: Not on file        Forced sexual activity: Not on file    Other Topics      Concerns:        Caffeine Concern: Not Asked        Exercise:  Yes Psychiatric: Mood, memory, affect and judgment normal.       ASSESSMENT AND PLAN:   1. Routine medical exam  - CBC WITH DIFFERENTIAL WITH PLATELET; Future  - COMP METABOLIC PANEL (14); Future  - DRAKE IGG/A/M AB PANEL;  Future  - HEMOGLOBIN A1C; Future  - The products and items listed below (the “Products”)  and their claims have not been evaluated by the Food and Drug Administration. Dietary products are not intended to treat, prevent, mitigate or cure disease.  Ultimately, you must draw your own conclusion Return in about 3 months (around 2/26/2020) for Integrative Medicine - Established (30 min) - hair loss.     Discussed with Patient the Following:  · Monthly breast self-exam  · Breast cancer screening/ mammograms and clinical breast exams  · Cervical cance

## 2019-11-26 NOTE — PATIENT INSTRUCTIONS
I have complete gwen in the body's ability to heal and transform. The products and items listed below (the “Products”)  and their claims have not been evaluated by the Food and Drug Administration.  Dietary products are not intended to treat, prevent, m

## 2020-01-06 ENCOUNTER — OFFICE VISIT (OUTPATIENT)
Dept: AUDIOLOGY | Facility: CLINIC | Age: 65
End: 2020-01-06
Payer: COMMERCIAL

## 2020-01-06 DIAGNOSIS — H90.3 SNHL (SENSORY-NEURAL HEARING LOSS), ASYMMETRICAL: Primary | ICD-10-CM

## 2020-01-06 PROCEDURE — 92567 TYMPANOMETRY: CPT | Performed by: AUDIOLOGIST

## 2020-01-06 PROCEDURE — 92557 COMPREHENSIVE HEARING TEST: CPT | Performed by: AUDIOLOGIST

## 2020-01-06 NOTE — PROGRESS NOTES
AUDIOLOGY REPORT      Pop Wheat is a 63year old female     Referring Provider: Homa Lopez   YOB: 1955  Medical Record: [id number]      Patient Hearing History:  Patient referred by Dr. Ana Sandoval for hearing loss.   Patient reports that s Estephanie. Otologic evaluation. Evaluate retrocochlear pathology. Monitor hearing sensitivity as needed.     1/6/2020  VICKEY Carlson

## 2020-01-08 NOTE — PROGRESS NOTES
Please provide patient with the name of an ENT physician to follow up with based on her hearing test results:  Dr. Veronique Paul  [address] [address] [phone]

## 2020-01-10 ENCOUNTER — OFFICE VISIT (OUTPATIENT)
Dept: OTOLARYNGOLOGY | Facility: CLINIC | Age: 65
End: 2020-01-10
Payer: COMMERCIAL

## 2020-01-10 VITALS
BODY MASS INDEX: 19.56 KG/M2 | DIASTOLIC BLOOD PRESSURE: 63 MMHG | SYSTOLIC BLOOD PRESSURE: 99 MMHG | WEIGHT: 109 LBS | HEIGHT: 62.5 IN | TEMPERATURE: 98 F

## 2020-01-10 DIAGNOSIS — H91.20 SUDDEN-ONSET SENSORINEURAL HEARING LOSS: Primary | ICD-10-CM

## 2020-01-10 PROCEDURE — 99243 OFF/OP CNSLTJ NEW/EST LOW 30: CPT | Performed by: OTOLARYNGOLOGY

## 2020-01-10 RX ORDER — PREDNISONE 10 MG/1
TABLET ORAL
Qty: 44 TABLET | Refills: 0 | Status: SHIPPED | OUTPATIENT
Start: 2020-01-10 | End: 2020-02-25

## 2020-01-10 NOTE — PROGRESS NOTES
Janice Worley is a 63year old female. Patient presents with:  Hearing Loss: left for a few months, audiogram done on 1-6-2020      HISTORY OF PRESENT ILLNESS  She presents with a 3 to 4-month history of decreased hearing on the left.   She states that t Other (Other) Mother    • Cancer Maternal Grandmother         breast cancer   • Heart Disorder Maternal Grandfather    • Cancer Paternal Grandfather         stomach cancer   • Other (Other) Paternal Grandfather    • Cancer Sister 48        breast, uterine Inspection - Right: Normal, Left: Normal. Canal - Right: Normal, Left: Normal. TM - Right: Normal, Left: Normal.   Skin Normal Inspection - Normal.        Lymph Detail Normal Submental. Submandibular. Anterior cervical. Posterior cervical. Supraclavicular.

## 2020-01-28 ENCOUNTER — OFFICE VISIT (OUTPATIENT)
Dept: OTOLARYNGOLOGY | Facility: CLINIC | Age: 65
End: 2020-01-28
Payer: COMMERCIAL

## 2020-01-28 ENCOUNTER — OFFICE VISIT (OUTPATIENT)
Dept: AUDIOLOGY | Facility: CLINIC | Age: 65
End: 2020-01-28
Payer: COMMERCIAL

## 2020-01-28 VITALS
TEMPERATURE: 98 F | WEIGHT: 109 LBS | SYSTOLIC BLOOD PRESSURE: 110 MMHG | HEIGHT: 62.5 IN | DIASTOLIC BLOOD PRESSURE: 64 MMHG | BODY MASS INDEX: 19.56 KG/M2

## 2020-01-28 DIAGNOSIS — H90.3 ASYMMETRICAL SENSORINEURAL HEARING LOSS: Primary | ICD-10-CM

## 2020-01-28 DIAGNOSIS — H91.93 BILATERAL HEARING LOSS, UNSPECIFIED HEARING LOSS TYPE: Primary | ICD-10-CM

## 2020-01-28 DIAGNOSIS — IMO0001 ASYMMETRICAL HEARING LOSS OF BOTH EARS: ICD-10-CM

## 2020-01-28 DIAGNOSIS — H91.20 SUDDEN-ONSET SENSORINEURAL HEARING LOSS: ICD-10-CM

## 2020-01-28 PROCEDURE — 99213 OFFICE O/P EST LOW 20 MIN: CPT | Performed by: OTOLARYNGOLOGY

## 2020-01-28 PROCEDURE — 92552 PURE TONE AUDIOMETRY AIR: CPT | Performed by: AUDIOLOGIST

## 2020-01-28 NOTE — PROGRESS NOTES
Lilian Higginbotham is a 63year old female.   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 Thyroid gland - Normal.   Eyes Normal Conjunctiva - Right: Normal, Left: Normal. Pupil - Right: Normal, Left: Normal. Fundus - Right: Normal, Left: Normal.   Neurological Normal Memory - Normal. Cranial nerves - Cranial nerves II through XI For that reason I have recommended an ABR and a repeat audiogram in 6 months. I will call her with the results of her ABR which she will do in the next few weeks. No real improvement in her hearing since her last study 2 weeks ago on steroids.   We did di

## 2020-02-04 ENCOUNTER — LAB ENCOUNTER (OUTPATIENT)
Dept: LAB | Facility: HOSPITAL | Age: 65
End: 2020-02-04
Attending: FAMILY MEDICINE
Payer: COMMERCIAL

## 2020-02-04 DIAGNOSIS — R79.0 LOW FERRITIN: ICD-10-CM

## 2020-02-04 DIAGNOSIS — Z00.00 ROUTINE MEDICAL EXAM: ICD-10-CM

## 2020-02-04 LAB
ALBUMIN SERPL-MCNC: 4.3 G/DL (ref 3.4–5)
ALBUMIN/GLOB SERPL: 1.3 {RATIO} (ref 1–2)
ALP LIVER SERPL-CCNC: 41 U/L (ref 50–130)
ALT SERPL-CCNC: 23 U/L (ref 13–56)
ANION GAP SERPL CALC-SCNC: 3 MMOL/L (ref 0–18)
AST SERPL-CCNC: 24 U/L (ref 15–37)
BASOPHILS # BLD AUTO: 0.05 X10(3) UL (ref 0–0.2)
BASOPHILS NFR BLD AUTO: 1.5 %
BILIRUB SERPL-MCNC: 0.7 MG/DL (ref 0.1–2)
BUN BLD-MCNC: 12 MG/DL (ref 7–18)
BUN/CREAT SERPL: 17.9 (ref 10–20)
CALCIUM BLD-MCNC: 9.3 MG/DL (ref 8.5–10.1)
CHLORIDE SERPL-SCNC: 108 MMOL/L (ref 98–112)
CHOLEST SMN-MCNC: 222 MG/DL (ref ?–200)
CO2 SERPL-SCNC: 30 MMOL/L (ref 21–32)
CREAT BLD-MCNC: 0.67 MG/DL (ref 0.55–1.02)
DEPRECATED HBV CORE AB SER IA-ACNC: 41.4 NG/ML (ref 18–340)
DEPRECATED RDW RBC AUTO: 43.1 FL (ref 35.1–46.3)
EOSINOPHIL # BLD AUTO: 0.08 X10(3) UL (ref 0–0.7)
EOSINOPHIL NFR BLD AUTO: 2.3 %
ERYTHROCYTE [DISTWIDTH] IN BLOOD BY AUTOMATED COUNT: 12.2 % (ref 11–15)
EST. AVERAGE GLUCOSE BLD GHB EST-MCNC: 103 MG/DL (ref 68–126)
GLOBULIN PLAS-MCNC: 3.3 G/DL (ref 2.8–4.4)
GLUCOSE BLD-MCNC: 87 MG/DL (ref 70–99)
HAV IGM SER QL: 2.2 MG/DL (ref 1.6–2.6)
HBA1C MFR BLD HPLC: 5.2 % (ref ?–5.7)
HCT VFR BLD AUTO: 39.6 % (ref 35–48)
HDLC SERPL-MCNC: 118 MG/DL (ref 40–59)
HGB BLD-MCNC: 12.9 G/DL (ref 12–16)
IMM GRANULOCYTES # BLD AUTO: 0 X10(3) UL (ref 0–1)
IMM GRANULOCYTES NFR BLD: 0 %
LDLC SERPL CALC-MCNC: 96 MG/DL (ref ?–100)
LYMPHOCYTES # BLD AUTO: 1.11 X10(3) UL (ref 1–4)
LYMPHOCYTES NFR BLD AUTO: 32.4 %
M PROTEIN MFR SERPL ELPH: 7.6 G/DL (ref 6.4–8.2)
MCH RBC QN AUTO: 31.5 PG (ref 26–34)
MCHC RBC AUTO-ENTMCNC: 32.6 G/DL (ref 31–37)
MCV RBC AUTO: 96.8 FL (ref 80–100)
MONOCYTES # BLD AUTO: 0.35 X10(3) UL (ref 0.1–1)
MONOCYTES NFR BLD AUTO: 10.2 %
NEUTROPHILS # BLD AUTO: 1.84 X10 (3) UL (ref 1.5–7.7)
NEUTROPHILS # BLD AUTO: 1.84 X10(3) UL (ref 1.5–7.7)
NEUTROPHILS NFR BLD AUTO: 53.6 %
NONHDLC SERPL-MCNC: 104 MG/DL (ref ?–130)
OSMOLALITY SERPL CALC.SUM OF ELEC: 291 MOSM/KG (ref 275–295)
PATIENT FASTING Y/N/NP: YES
PATIENT FASTING Y/N/NP: YES
PLATELET # BLD AUTO: 218 10(3)UL (ref 150–450)
POTASSIUM SERPL-SCNC: 4.3 MMOL/L (ref 3.5–5.1)
RBC # BLD AUTO: 4.09 X10(6)UL (ref 3.8–5.3)
SODIUM SERPL-SCNC: 141 MMOL/L (ref 136–145)
TRIGL SERPL-MCNC: 38 MG/DL (ref 30–149)
TSI SER-ACNC: 1.3 MIU/ML (ref 0.36–3.74)
VLDLC SERPL CALC-MCNC: 8 MG/DL (ref 0–30)
WBC # BLD AUTO: 3.4 X10(3) UL (ref 4–11)

## 2020-02-04 PROCEDURE — 80061 LIPID PANEL: CPT

## 2020-02-04 PROCEDURE — 36415 COLL VENOUS BLD VENIPUNCTURE: CPT

## 2020-02-04 PROCEDURE — 84443 ASSAY THYROID STIM HORMONE: CPT

## 2020-02-04 PROCEDURE — 83036 HEMOGLOBIN GLYCOSYLATED A1C: CPT

## 2020-02-04 PROCEDURE — 86628 CANDIDA ANTIBODY: CPT

## 2020-02-04 PROCEDURE — 82728 ASSAY OF FERRITIN: CPT

## 2020-02-04 PROCEDURE — 83735 ASSAY OF MAGNESIUM: CPT

## 2020-02-04 PROCEDURE — 85025 COMPLETE CBC W/AUTO DIFF WBC: CPT

## 2020-02-04 PROCEDURE — 80053 COMPREHEN METABOLIC PANEL: CPT

## 2020-02-06 LAB
CANDIDA ANTIBODY IGA: 1.42 EV
CANDIDA ANTIBODY IGG: 2.2 EV
CANDIDA ANTIBODY IGM: 0.92 EV

## 2020-02-10 ENCOUNTER — OFFICE VISIT (OUTPATIENT)
Dept: AUDIOLOGY | Facility: CLINIC | Age: 65
End: 2020-02-10
Payer: COMMERCIAL

## 2020-02-10 DIAGNOSIS — IMO0001 ASYMMETRICAL HEARING LOSS OF LEFT EAR: Primary | ICD-10-CM

## 2020-02-10 PROCEDURE — 92585 AUDITORY EVOKED POTENTIAL: CPT | Performed by: AUDIOLOGIST

## 2020-02-10 NOTE — PROGRESS NOTES
ADULT AUDITORY BRAINSTEM RESPONSE (ABR) TEST RESULTS    Matilde Brambila was referred for testing by Cameron Carranza.      Otoscopic Inspection:  Right ear:  no cerumen  Left ear: no cerumen    Stimulus used:  22FQUUJ rarefacting clicks presented at 24.1 and 5

## 2020-02-15 ENCOUNTER — TELEPHONE (OUTPATIENT)
Dept: OTOLARYNGOLOGY | Facility: CLINIC | Age: 65
End: 2020-02-15

## 2020-02-15 NOTE — TELEPHONE ENCOUNTER
Author: Donny Espino MD Service: Kendrick Mitchell Type: Physician   Filed: 2/12/2020  8:22 AM Encounter Date: 2/10/2020 Status: Signed   : Donny Espino MD (Physician)        Show:Clear all  [x]Manual[]Template[]Copied    Added by:  Lyudmila Pennington

## 2020-02-25 ENCOUNTER — LAB ENCOUNTER (OUTPATIENT)
Dept: LAB | Facility: REFERENCE LAB | Age: 65
End: 2020-02-25
Attending: FAMILY MEDICINE
Payer: COMMERCIAL

## 2020-02-25 ENCOUNTER — OFFICE VISIT (OUTPATIENT)
Dept: INTEGRATIVE MEDICINE | Facility: CLINIC | Age: 65
End: 2020-02-25
Payer: COMMERCIAL

## 2020-02-25 VITALS
BODY MASS INDEX: 19.92 KG/M2 | WEIGHT: 111 LBS | SYSTOLIC BLOOD PRESSURE: 92 MMHG | DIASTOLIC BLOOD PRESSURE: 62 MMHG | HEART RATE: 70 BPM | OXYGEN SATURATION: 99 % | HEIGHT: 62.5 IN

## 2020-02-25 DIAGNOSIS — L65.9 HAIR LOSS: ICD-10-CM

## 2020-02-25 DIAGNOSIS — R53.82 CHRONIC FATIGUE: ICD-10-CM

## 2020-02-25 DIAGNOSIS — M79.89 SWELLING OF LEFT LOWER EXTREMITY: ICD-10-CM

## 2020-02-25 DIAGNOSIS — Z00.00 ROUTINE GENERAL MEDICAL EXAMINATION AT A HEALTH CARE FACILITY: Primary | ICD-10-CM

## 2020-02-25 DIAGNOSIS — H91.92 HEARING LOSS OF LEFT EAR, UNSPECIFIED HEARING LOSS TYPE: ICD-10-CM

## 2020-02-25 DIAGNOSIS — H90.3 SNHL (SENSORY-NEURAL HEARING LOSS), ASYMMETRICAL: ICD-10-CM

## 2020-02-25 DIAGNOSIS — M54.2 NECK PAIN: ICD-10-CM

## 2020-02-25 DIAGNOSIS — R79.0 LOW FERRITIN: Primary | ICD-10-CM

## 2020-02-25 PROCEDURE — 36415 COLL VENOUS BLD VENIPUNCTURE: CPT | Performed by: FAMILY MEDICINE

## 2020-02-25 PROCEDURE — 99214 OFFICE O/P EST MOD 30 MIN: CPT | Performed by: FAMILY MEDICINE

## 2020-02-25 NOTE — PATIENT INSTRUCTIONS
I have complete gwen in the body's ability to heal and transform. The products and items listed below (the “Products”)  and their claims have not been evaluated by the Food and Drug Administration.  Dietary products are not intended to treat, prevent, m C  Vitamin D, 25-0H  Vitamin D3  Vitamin E  Vitamin K1  Vitamin K2  Folate    Minerals  Calcium  Magnesium  Manganese  Zinc  Copper  Chromium  Iron    Amino Acids  Asparagine  Glutamine  Serine    Antioxidants  Coenzyme Q10  Cysteine  Glutathione  Selenium

## 2020-02-25 NOTE — PROGRESS NOTES
Dario Villafuerte is a 63year old female. Patient presents with: Integrative Medicine Consult: 3 mo f/u      HPI:     Hair loss is improved. Energy is low. Not sleeping enough. No issue getting to sleep when she goes to bed.    Took prednisone as rx'd Smoker      Smokeless tobacco: Never Used    Substance and Sexual Activity      Alcohol use: Yes        Comment: occ      Drug use: No      Sexual activity: Not on file    Lifestyle      Physical activity:        Days per week: Not on file        Minutes p time. No cranial nerve deficit. Gait normal.   Skin: Skin is warm and dry. Psychiatric: Affect normal.       ASSESSMENT AND PLAN:       1. Low ferritin    2. Hair loss    3. Hearing loss of left ear, unspecified hearing loss type    4.  SNHL (sensory-neur healthcare professional and stop use of Products should any reactions arise.      Recommendations:    Increase the iron dose to twice daily    [address]  [address] [facility], [address] [place] [address]    Phone: [phone]

## 2020-05-12 ENCOUNTER — TELEPHONE (OUTPATIENT)
Dept: INTEGRATIVE MEDICINE | Facility: CLINIC | Age: 65
End: 2020-05-12

## 2020-05-12 NOTE — TELEPHONE ENCOUNTER
LVM informing patient to call back to set up video visit per Dr. Luann Carr request to go over micronutrient results. Pt already has a f/u visit scheduled for 6. 9.20 in-office - advised patient to notify us if she wants to wait until visit or call to sched

## 2020-06-01 ENCOUNTER — TELEPHONE (OUTPATIENT)
Dept: OTOLARYNGOLOGY | Facility: CLINIC | Age: 65
End: 2020-06-01

## 2020-06-04 ENCOUNTER — OFFICE VISIT (OUTPATIENT)
Dept: AUDIOLOGY | Facility: CLINIC | Age: 65
End: 2020-06-04
Payer: COMMERCIAL

## 2020-06-04 ENCOUNTER — OFFICE VISIT (OUTPATIENT)
Dept: OTOLARYNGOLOGY | Facility: CLINIC | Age: 65
End: 2020-06-04
Payer: COMMERCIAL

## 2020-06-04 VITALS
DIASTOLIC BLOOD PRESSURE: 66 MMHG | WEIGHT: 111 LBS | BODY MASS INDEX: 20.43 KG/M2 | HEIGHT: 62 IN | SYSTOLIC BLOOD PRESSURE: 107 MMHG | HEART RATE: 58 BPM

## 2020-06-04 DIAGNOSIS — H91.90 HEARING LOSS, UNSPECIFIED HEARING LOSS TYPE, UNSPECIFIED LATERALITY: Primary | ICD-10-CM

## 2020-06-04 DIAGNOSIS — H90.3 ASYMMETRICAL SENSORINEURAL HEARING LOSS: Primary | ICD-10-CM

## 2020-06-04 PROCEDURE — 92556 SPEECH AUDIOMETRY COMPLETE: CPT | Performed by: AUDIOLOGIST

## 2020-06-04 PROCEDURE — 92567 TYMPANOMETRY: CPT | Performed by: AUDIOLOGIST

## 2020-06-04 PROCEDURE — 92552 PURE TONE AUDIOMETRY AIR: CPT | Performed by: AUDIOLOGIST

## 2020-06-04 PROCEDURE — 99214 OFFICE O/P EST MOD 30 MIN: CPT | Performed by: OTOLARYNGOLOGY

## 2020-06-04 NOTE — PROGRESS NOTES
Luis Grayson is a 63year old female.   Patient presents with:  Hearing Loss  Ear Problem: static noise in ears, more sensative to loud sounds       HISTORY OF PRESENT ILLNESS  She presents with a 3 to 4-month history of decreased hearing on the left.  S stable pure-tone thresholds at all frequencies since she originally had a problem in early January. I did recommend an E BR versus an MRI to rule out a retrocochlear process and she chose to do an ABR.   ABR performed in January 2020 demonstrates normal po changes. Respiratory Negative Dyspnea and wheezing. Cardio Negative Chest pain, irregular heartbeat/palpitations and syncope. GI Negative Abdominal pain and diarrhea. Endocrine Negative Cold intolerance and heat intolerance.    Neuro Negative Tremor unspecified laterality  Since I last saw her she underwent an ABR which demonstrated normal function bilaterally. Audiogram performed today demonstrates a persistent left-sided hearing loss which is relatively unchanged from her previous studies.   I did g

## 2020-06-04 NOTE — TELEPHONE ENCOUNTER
pt states a couple of weeks ago, pt started to hear a static noise in her ears, pt states ears are sensitive to loud sounds now. Per , pt scheduled with  today.

## 2020-06-04 NOTE — PROGRESS NOTES
AUDIOLOGY REPORT      Jessica Domingo is a 63year old female     Referring Provider: Pj Braswell   YOB: 1955  Medical Record: [id number]      Patient Hearing History:  Patient seen for follow up hearing testing to monitor any changes to th

## 2020-06-09 ENCOUNTER — OFFICE VISIT (OUTPATIENT)
Dept: INTEGRATIVE MEDICINE | Facility: CLINIC | Age: 65
End: 2020-06-09
Payer: COMMERCIAL

## 2020-06-09 VITALS
WEIGHT: 109.81 LBS | HEIGHT: 62 IN | BODY MASS INDEX: 20.21 KG/M2 | SYSTOLIC BLOOD PRESSURE: 104 MMHG | DIASTOLIC BLOOD PRESSURE: 62 MMHG

## 2020-06-09 DIAGNOSIS — R79.0 LOW FERRITIN: ICD-10-CM

## 2020-06-09 DIAGNOSIS — Z78.0 POSTMENOPAUSAL: ICD-10-CM

## 2020-06-09 DIAGNOSIS — L65.9 HAIR LOSS: ICD-10-CM

## 2020-06-09 DIAGNOSIS — M54.2 NECK PAIN: ICD-10-CM

## 2020-06-09 DIAGNOSIS — R53.82 CHRONIC FATIGUE: Primary | ICD-10-CM

## 2020-06-09 PROCEDURE — 99214 OFFICE O/P EST MOD 30 MIN: CPT | Performed by: FAMILY MEDICINE

## 2020-06-09 NOTE — PATIENT INSTRUCTIONS
I have complete gwen in the body's ability to heal and transform. The products and items listed below (the “Products”)  and their claims have not been evaluated by the Food and Drug Administration.  Dietary products are not intended to treat, prevent, m https://[employer]. Ribbon/community-connection/  I will be offering a free meditation every Friday at 7:30am CT. Dealing with Stress and Anxiety    · Set aside 5 min throughout your day to sit in silence, meditation or deep breathing;  If you can fit in your breathing, where the end of the inhalation is the beginning of the exhalation without pause. From Anita Dixon: “Breathing in, I calm my body. Breathing out, I smile.  Dwelling in the present moment, I know this is a wonderful moment.”    Keep a online:  https://www.bessyDriveK.org/. com/DownloadMeditations. html  Insight [email or web address].[email or web address].pt. com  http://[email or web address]/body. cfm?id=22  http://www. GotaCopy.StepUp/ccl/guided-meditations  http://www. Loudcaster.StepUp/cms/free-audio-meditations  Juana.

## 2020-06-09 NOTE — PROGRESS NOTES
Malika Hernandez is a 63year old female. Patient presents with: Follow - Up: alb results , general f/u   Skin: burning sensation       HPI:     Gets burning skin sensation on back and arms every day.  Will occur several times throughout the day at any wilver Non-medical: Not on file    Tobacco Use      Smoking status: Never Smoker      Smokeless tobacco: Never Used    Substance and Sexual Activity      Alcohol use: Yes        Comment: occ      Drug use: No      Sexual activity: Not on file    Lifestyle Neurological: She is alert and oriented to person, place, and time. No cranial nerve deficit. Gait normal.   Skin: Skin is warm and dry. Psychiatric: Affect normal.       ASSESSMENT AND PLAN:       1. Chronic fatigue    2. Neck pain    3.  Postmenopausal Fax: [phone]    Email: [email or web address]  [email or web address]. [employer]    Increase the grapefruit seed extract and caprylic acid to 2 tabs twice daily. Increase the Omega 3 dose to 2 tablets twice daily.     Phytostan by [employer] · If you have specific Jehovah's witness beliefs, prayer can be a form of meditation. Repeating prayers or mantras can instill a sense of peace (use a rosary bead or keisha). · Gratitude can instill a sense of peace.  Keep a gratitude journal and write down at leas 2. Close your mouth and inhale quietly through your nose to a mental count of 4.    3. Hold your breath for a count of 7.    4. Exhale completely through your mouth, making a whoosh sound to a count of 8. This is one breath.  Now inhale again and repeat

## 2020-08-26 ENCOUNTER — MED REC SCAN ONLY (OUTPATIENT)
Dept: INTEGRATIVE MEDICINE | Facility: CLINIC | Age: 65
End: 2020-08-26

## 2020-10-29 ENCOUNTER — MED REC SCAN ONLY (OUTPATIENT)
Dept: INTEGRATIVE MEDICINE | Facility: CLINIC | Age: 65
End: 2020-10-29

## 2020-11-10 ENCOUNTER — OFFICE VISIT (OUTPATIENT)
Dept: INTEGRATIVE MEDICINE | Facility: CLINIC | Age: 65
End: 2020-11-10
Payer: COMMERCIAL

## 2020-11-10 VITALS
OXYGEN SATURATION: 98 % | DIASTOLIC BLOOD PRESSURE: 60 MMHG | HEART RATE: 75 BPM | BODY MASS INDEX: 20.98 KG/M2 | SYSTOLIC BLOOD PRESSURE: 102 MMHG | HEIGHT: 62 IN | WEIGHT: 114 LBS

## 2020-11-10 DIAGNOSIS — R53.82 CHRONIC FATIGUE: Primary | ICD-10-CM

## 2020-11-10 DIAGNOSIS — Z78.0 POSTMENOPAUSAL: ICD-10-CM

## 2020-11-10 DIAGNOSIS — H91.92 HEARING LOSS OF LEFT EAR, UNSPECIFIED HEARING LOSS TYPE: ICD-10-CM

## 2020-11-10 DIAGNOSIS — R79.0 LOW FERRITIN: ICD-10-CM

## 2020-11-10 DIAGNOSIS — L65.9 HAIR LOSS: ICD-10-CM

## 2020-11-10 DIAGNOSIS — M54.2 NECK PAIN: ICD-10-CM

## 2020-11-10 DIAGNOSIS — F43.9 STRESS AT HOME: ICD-10-CM

## 2020-11-10 PROCEDURE — 3078F DIAST BP <80 MM HG: CPT | Performed by: FAMILY MEDICINE

## 2020-11-10 PROCEDURE — 99214 OFFICE O/P EST MOD 30 MIN: CPT | Performed by: FAMILY MEDICINE

## 2020-11-10 PROCEDURE — 3008F BODY MASS INDEX DOCD: CPT | Performed by: FAMILY MEDICINE

## 2020-11-10 PROCEDURE — 3074F SYST BP LT 130 MM HG: CPT | Performed by: FAMILY MEDICINE

## 2020-11-10 NOTE — PROGRESS NOTES
Moody Mohs is a 63year old female. Patient presents with: Follow - Up      HPI:     Feeling nervous about the pandemic and the election. Feeling anxious, sometimes feels panicky. Seeing the grandkids limitedly. Also having stress from work.   Wo Worry: Not on file        Inability: Not on file      Transportation needs        Medical: Not on file        Non-medical: Not on file    Tobacco Use      Smoking status: Never Smoker      Smokeless tobacco: Never Used    Substance and Sexual Activit Neck: Neck supple. Cardiovascular: Normal rate. Pulmonary/Chest: Effort normal.   Musculoskeletal:         General: No edema. Neurological: She is alert and oriented to person, place, and time. No cranial nerve deficit.  Gait normal.   Skin: Skin is w The products and items listed below (the “Products”)  and their claims have not been evaluated by the Food and Drug Administration. Dietary products are not intended to treat, prevent, mitigate or cure disease.  Ultimately, you must draw your own conclusion · Just BREATHE! One of the easiest practices to do is to sit and pay attention to your breath. You can literally say to yourself, “I am breathing in” as you breathe in and “I am breathing out” as you breathe out. Use these phrases to focus your mind.    · [address] [facility] one word to repeat out loud at first, then quieter and quieter until only mouthing the word. Then say quietly inside your head and then allow this word to pull you into complete relaxation.     Zachery Hale or Check out these books:  Conscious Breathing: Breathwork for Health, Stress Release, and Personal Mastery by Deanna Freed    The Miracle of Mindfulness by Ruth Ann Alexandre              Return in about 4 months (around 3/10/2021) for Complete Physical (30 mi

## 2020-11-10 NOTE — PATIENT INSTRUCTIONS
I have complete gwen in the body's ability to heal and transform. The products and items listed below (the “Products”)  and their claims have not been evaluated by the Food and Drug Administration.  Dietary products are not intended to treat, prevent, m place to sit. · Start by sitting in a comfortable position. · Just BREATHE! One of the easiest practices to do is to sit and pay attention to your breath.  You can literally say to yourself, “I am breathing in” as you breathe in and “I am breathing out” a to repeat out loud at first, then quieter and quieter until only mouthing the word. Then say quietly inside your head and then allow this word to pull you into complete relaxation.     Prayer - Elizabeth Hickman or “Lord’s Prayer” or other prayer done in repetit Health, Stress Release, and Personal Mastery by Day Finch    The Miracle of Mindfulness by [facility]

## 2021-03-23 ENCOUNTER — OFFICE VISIT (OUTPATIENT)
Dept: INTEGRATIVE MEDICINE | Facility: CLINIC | Age: 66
End: 2021-03-23
Payer: COMMERCIAL

## 2021-03-23 VITALS
DIASTOLIC BLOOD PRESSURE: 60 MMHG | HEART RATE: 74 BPM | BODY MASS INDEX: 21.05 KG/M2 | HEIGHT: 62 IN | OXYGEN SATURATION: 97 % | WEIGHT: 114.38 LBS | SYSTOLIC BLOOD PRESSURE: 94 MMHG

## 2021-03-23 DIAGNOSIS — M54.2 NECK PAIN: ICD-10-CM

## 2021-03-23 DIAGNOSIS — B37.9 CANDIDIASIS: ICD-10-CM

## 2021-03-23 DIAGNOSIS — Z78.0 POSTMENOPAUSAL: ICD-10-CM

## 2021-03-23 DIAGNOSIS — H90.3 SNHL (SENSORY-NEURAL HEARING LOSS), ASYMMETRICAL: ICD-10-CM

## 2021-03-23 DIAGNOSIS — R79.0 LOW FERRITIN: ICD-10-CM

## 2021-03-23 DIAGNOSIS — Z00.00 ROUTINE MEDICAL EXAM: Primary | ICD-10-CM

## 2021-03-23 PROCEDURE — 3078F DIAST BP <80 MM HG: CPT | Performed by: FAMILY MEDICINE

## 2021-03-23 PROCEDURE — 3074F SYST BP LT 130 MM HG: CPT | Performed by: FAMILY MEDICINE

## 2021-03-23 PROCEDURE — 99396 PREV VISIT EST AGE 40-64: CPT | Performed by: FAMILY MEDICINE

## 2021-03-23 PROCEDURE — 3008F BODY MASS INDEX DOCD: CPT | Performed by: FAMILY MEDICINE

## 2021-03-23 NOTE — PROGRESS NOTES
Az Crespo is a 61year old female. Patient presents with:  Physical: no pap      HPI:     Feeling a little better, still anxious. Received the COVID vaccine. In the midst of moving. Seeing her family more now. Work is full.    Still with neck p Cancer Paternal Grandfather         stomach cancer   • Other (Other) Paternal Grandfather    • Cancer Sister 48        breast, uterine cancer       IMMUNIZATION HISTORY:     Immunization History   Administered Date(s) Administered   • Covid-19 Vaccine Mode Session:   Stress:       Feeling of Stress :   Social Connections:       Frequency of Communication with Friends and Family:       Frequency of Social Gatherings with Friends and Family:       Attends Judaism Services:       Active Member of Clubs or Org Mental Status: She is alert and oriented to person, place, and time. Cranial Nerves: No cranial nerve deficit. Gait: Gait is intact.    Psychiatric:         Mood and Affect: Mood and affect normal.         Cognition and Memory: Memory normal. foods      Patient affirmed understanding of plan and all questions were answered.      Courtney Argueta, DO

## 2021-04-12 ENCOUNTER — OFFICE VISIT (OUTPATIENT)
Dept: INTEGRATIVE MEDICINE | Facility: CLINIC | Age: 66
End: 2021-04-12
Payer: COMMERCIAL

## 2021-04-12 DIAGNOSIS — B37.9 CANDIDIASIS: ICD-10-CM

## 2021-04-12 DIAGNOSIS — R79.0 LOW FERRITIN: ICD-10-CM

## 2021-04-12 DIAGNOSIS — Z71.3 NUTRITIONAL COUNSELING: ICD-10-CM

## 2021-04-12 PROCEDURE — 97802 MEDICAL NUTRITION INDIV IN: CPT | Performed by: NUTRITIONIST

## 2021-04-12 NOTE — PROGRESS NOTES
Patient referred by Cindy Trujillo  Did patient complete Nutritional Therapy Questionnaire? NO  Email: [email or web address]    Ronak Trejo is a 61year old female presenting for medical nutrition therapy in regards to improving overall health.   Was suggest up in 2-3 weeks of supplement use    Time spent with patient: 60 minutes  Dietary Supplements:   See patient instructions    TUNG Jones  4/12/2021  10:05 AM

## 2021-08-08 ENCOUNTER — APPOINTMENT (OUTPATIENT)
Dept: GENERAL RADIOLOGY | Age: 66
End: 2021-08-08
Attending: EMERGENCY MEDICINE
Payer: COMMERCIAL

## 2021-08-08 ENCOUNTER — HOSPITAL ENCOUNTER (OUTPATIENT)
Age: 66
Discharge: HOME | End: 2021-08-08
Attending: EMERGENCY MEDICINE
Payer: COMMERCIAL

## 2021-08-08 VITALS
HEART RATE: 65 BPM | TEMPERATURE: 100 F | RESPIRATION RATE: 18 BRPM | OXYGEN SATURATION: 98 % | SYSTOLIC BLOOD PRESSURE: 109 MMHG | DIASTOLIC BLOOD PRESSURE: 46 MMHG

## 2021-08-08 DIAGNOSIS — M79.671 RIGHT FOOT PAIN: Primary | ICD-10-CM

## 2021-08-08 PROCEDURE — 99213 OFFICE O/P EST LOW 20 MIN: CPT

## 2021-08-08 PROCEDURE — 73630 X-RAY EXAM OF FOOT: CPT | Performed by: EMERGENCY MEDICINE

## 2021-08-08 PROCEDURE — 99203 OFFICE O/P NEW LOW 30 MIN: CPT

## 2021-08-08 NOTE — ED INITIAL ASSESSMENT (HPI)
Pt here to IC with c/o right foot pain that started last night. No injury per patient. Slight ecchymosis noted to top of right foot.

## 2021-11-02 ENCOUNTER — LAB ENCOUNTER (OUTPATIENT)
Dept: LAB | Facility: HOSPITAL | Age: 66
End: 2021-11-02
Attending: FAMILY MEDICINE
Payer: COMMERCIAL

## 2021-11-02 DIAGNOSIS — Z00.00 ROUTINE MEDICAL EXAM: ICD-10-CM

## 2021-11-02 DIAGNOSIS — B37.9 CANDIDIASIS: ICD-10-CM

## 2021-11-02 DIAGNOSIS — R79.0 LOW FERRITIN: ICD-10-CM

## 2021-11-02 PROCEDURE — 86628 CANDIDA ANTIBODY: CPT

## 2021-11-02 PROCEDURE — 80053 COMPREHEN METABOLIC PANEL: CPT

## 2021-11-02 PROCEDURE — 84443 ASSAY THYROID STIM HORMONE: CPT

## 2021-11-02 PROCEDURE — 36415 COLL VENOUS BLD VENIPUNCTURE: CPT

## 2021-11-02 PROCEDURE — 85025 COMPLETE CBC W/AUTO DIFF WBC: CPT

## 2021-11-02 PROCEDURE — 83735 ASSAY OF MAGNESIUM: CPT

## 2021-11-02 PROCEDURE — 82728 ASSAY OF FERRITIN: CPT

## 2021-11-02 PROCEDURE — 82306 VITAMIN D 25 HYDROXY: CPT

## 2021-11-02 PROCEDURE — 83036 HEMOGLOBIN GLYCOSYLATED A1C: CPT

## 2021-11-05 ENCOUNTER — TELEPHONE (OUTPATIENT)
Dept: INTEGRATIVE MEDICINE | Facility: CLINIC | Age: 66
End: 2021-11-05

## 2021-11-05 NOTE — TELEPHONE ENCOUNTER
Please let patient know that all the labs resulted as of today and it will take 2-3 business days to review. She will be messaged through [employer] with the results and recommendations.

## 2021-12-08 ENCOUNTER — IMMUNIZATION (OUTPATIENT)
Dept: LAB | Facility: HOSPITAL | Age: 66
End: 2021-12-08
Attending: EMERGENCY MEDICINE
Payer: COMMERCIAL

## 2021-12-08 DIAGNOSIS — Z23 NEED FOR VACCINATION: Primary | ICD-10-CM

## 2021-12-08 PROCEDURE — 0064A SARSCOV2 VAC 50MCG/0.25ML IM: CPT

## 2021-12-27 ENCOUNTER — TELEPHONE (OUTPATIENT)
Dept: INTEGRATIVE MEDICINE | Facility: CLINIC | Age: 66
End: 2021-12-27

## 2021-12-27 NOTE — TELEPHONE ENCOUNTER
Received vm from pt, she tested positive for COVID on 12/26. Has some congestion, wants to know if OK to take mucinex. Also has been tachy (elevated HR). I spoke with pt, she is only on supplements per Dr. Jose Horta. Confirmed OK to take mucinex.  C/o itchy

## 2022-01-24 ENCOUNTER — APPOINTMENT (OUTPATIENT)
Dept: GENERAL RADIOLOGY | Age: 67
End: 2022-01-24
Attending: NURSE PRACTITIONER
Payer: MEDICARE

## 2022-01-24 ENCOUNTER — TELEPHONE (OUTPATIENT)
Dept: INTEGRATIVE MEDICINE | Facility: CLINIC | Age: 67
End: 2022-01-24

## 2022-01-24 ENCOUNTER — HOSPITAL ENCOUNTER (OUTPATIENT)
Age: 67
Discharge: HOME | End: 2022-01-24
Payer: MEDICARE

## 2022-01-24 VITALS
DIASTOLIC BLOOD PRESSURE: 50 MMHG | HEART RATE: 75 BPM | RESPIRATION RATE: 18 BRPM | TEMPERATURE: 99 F | SYSTOLIC BLOOD PRESSURE: 120 MMHG | OXYGEN SATURATION: 100 %

## 2022-01-24 DIAGNOSIS — M75.42 SHOULDER IMPINGEMENT SYNDROME, LEFT: ICD-10-CM

## 2022-01-24 DIAGNOSIS — S49.92XD INJURY OF LEFT SHOULDER, SUBSEQUENT ENCOUNTER: ICD-10-CM

## 2022-01-24 DIAGNOSIS — S20.212A CONTUSION OF RIB ON LEFT SIDE, INITIAL ENCOUNTER: Primary | ICD-10-CM

## 2022-01-24 PROCEDURE — 73030 X-RAY EXAM OF SHOULDER: CPT | Performed by: NURSE PRACTITIONER

## 2022-01-24 PROCEDURE — 71111 X-RAY EXAM RIBS/CHEST4/> VWS: CPT | Performed by: NURSE PRACTITIONER

## 2022-01-24 PROCEDURE — 99213 OFFICE O/P EST LOW 20 MIN: CPT | Performed by: NURSE PRACTITIONER

## 2022-01-24 RX ORDER — TRAMADOL HYDROCHLORIDE 50 MG/1
TABLET ORAL EVERY 6 HOURS PRN
Qty: 10 TABLET | Refills: 0 | Status: SHIPPED | OUTPATIENT
Start: 2022-01-24 | End: 2022-01-29

## 2022-01-24 RX ORDER — CYCLOBENZAPRINE HCL 10 MG
10 TABLET ORAL 3 TIMES DAILY PRN
Qty: 20 TABLET | Refills: 0 | Status: SHIPPED | OUTPATIENT
Start: 2022-01-24 | End: 2022-01-31
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
## 2022-01-24 NOTE — ED PROVIDER NOTES
Patient Seen in: [facility]      History   Patient presents with:  Chest Pain    Stated Complaint: Chest pain    Subjective:   HPI    16-year-old female here today with complaints of left shoulder pain and left chest wall pain after receivin place and time. Not in acute distress. HEENT: Head is normocephalic atraumatic. Pupils reactive bilaterally. EOMs intact. No facial droop or slurred speech. No oral pallor. Mucous membranes moist.      Neck: No cervical lymphadenopathy. No stridor. a mechanical fall, no syncope or head injury. No shortness of breath or chest pain and the patient has oxygen saturation which is within normal limits for this patient. X-rays were performed which did not reveal any acute fracture or dislocation.   Mya

## 2022-01-25 NOTE — TELEPHONE ENCOUNTER
Reviewed her UC notes. No fractures on imaging. She can discuss Dexa at follow up with Dr Modesta Ayala in April or schedule sooner apt.

## 2022-04-28 ENCOUNTER — OFFICE VISIT (OUTPATIENT)
Dept: AUDIOLOGY | Facility: CLINIC | Age: 67
End: 2022-04-28
Payer: MEDICARE

## 2022-04-28 ENCOUNTER — OFFICE VISIT (OUTPATIENT)
Dept: OTOLARYNGOLOGY | Facility: CLINIC | Age: 67
End: 2022-04-28
Payer: MEDICARE

## 2022-04-28 VITALS — WEIGHT: 114 LBS | TEMPERATURE: 98 F | HEIGHT: 62 IN | BODY MASS INDEX: 20.98 KG/M2

## 2022-04-28 DIAGNOSIS — H93.12 TINNITUS OF LEFT EAR: Primary | ICD-10-CM

## 2022-04-28 DIAGNOSIS — H91.92 HEARING LOSS OF LEFT EAR, UNSPECIFIED HEARING LOSS TYPE: Primary | ICD-10-CM

## 2022-04-28 DIAGNOSIS — H91.90 HEARING LOSS, UNSPECIFIED HEARING LOSS TYPE, UNSPECIFIED LATERALITY: ICD-10-CM

## 2022-04-28 PROCEDURE — 99214 OFFICE O/P EST MOD 30 MIN: CPT | Performed by: OTOLARYNGOLOGY

## 2022-04-28 PROCEDURE — 92567 TYMPANOMETRY: CPT | Performed by: AUDIOLOGIST

## 2022-04-28 PROCEDURE — 92557 COMPREHENSIVE HEARING TEST: CPT | Performed by: AUDIOLOGIST

## 2022-05-19 ENCOUNTER — OFFICE VISIT (OUTPATIENT)
Dept: INTEGRATIVE MEDICINE | Facility: CLINIC | Age: 67
End: 2022-05-19
Payer: MEDICARE

## 2022-05-19 VITALS
BODY MASS INDEX: 20.28 KG/M2 | OXYGEN SATURATION: 98 % | HEIGHT: 62 IN | WEIGHT: 110.19 LBS | DIASTOLIC BLOOD PRESSURE: 60 MMHG | HEART RATE: 76 BPM | SYSTOLIC BLOOD PRESSURE: 114 MMHG

## 2022-05-19 DIAGNOSIS — Z00.00 ROUTINE MEDICAL EXAM: Primary | ICD-10-CM

## 2022-05-19 DIAGNOSIS — H25.10 NUCLEAR SENILE CATARACT, UNSPECIFIED LATERALITY: ICD-10-CM

## 2022-05-19 DIAGNOSIS — R22.32 LOCALIZED SWELLING, MASS AND LUMP, LEFT UPPER LIMB: ICD-10-CM

## 2022-05-19 DIAGNOSIS — Z78.0 POSTMENOPAUSAL: ICD-10-CM

## 2022-05-19 DIAGNOSIS — M54.2 NECK PAIN: ICD-10-CM

## 2022-05-19 DIAGNOSIS — H16.229 KERATOCONJUNCTIVITIS SICCA, NOT SPECIFIED AS SJOGREN'S, UNSPECIFIED LATERALITY: ICD-10-CM

## 2022-05-19 DIAGNOSIS — H43.819 VITREOUS DEGENERATION, UNSPECIFIED LATERALITY: ICD-10-CM

## 2022-05-19 DIAGNOSIS — N95.2 VAGINAL ATROPHY: ICD-10-CM

## 2022-05-19 DIAGNOSIS — H90.3 SNHL (SENSORY-NEURAL HEARING LOSS), ASYMMETRICAL: ICD-10-CM

## 2022-05-19 DIAGNOSIS — L72.9 SUBCUTANEOUS CYST: ICD-10-CM

## 2022-05-19 DIAGNOSIS — F41.9 ANXIETY: ICD-10-CM

## 2022-05-19 DIAGNOSIS — R39.15 URINARY URGENCY: ICD-10-CM

## 2022-05-19 DIAGNOSIS — M85.80 OSTEOPENIA, UNSPECIFIED LOCATION: ICD-10-CM

## 2022-05-19 DIAGNOSIS — B37.9 CANDIDIASIS: ICD-10-CM

## 2022-05-19 DIAGNOSIS — R79.0 LOW FERRITIN: ICD-10-CM

## 2022-05-19 PROBLEM — H01.029 SQUAMOUS BLEPHARITIS: Status: ACTIVE | Noted: 2022-05-19

## 2022-05-19 PROBLEM — H01.029 SQUAMOUS BLEPHARITIS: Status: RESOLVED | Noted: 2022-05-19 | Resolved: 2022-05-19

## 2022-05-19 PROCEDURE — G0402 INITIAL PREVENTIVE EXAM: HCPCS | Performed by: FAMILY MEDICINE

## 2022-05-19 RX ORDER — PNV NO.95/FERROUS FUM/FOLIC AC 28MG-0.8MG
TABLET ORAL
COMMUNITY

## 2022-05-19 RX ORDER — MULTIVIT-MIN/IRON/FOLIC ACID/K 18-600-40
1 CAPSULE ORAL AS DIRECTED
COMMUNITY

## 2022-05-19 RX ORDER — GRAPE SEED EXTRACT 60 MG
1 CAPSULE ORAL AS DIRECTED
COMMUNITY

## 2022-05-19 RX ORDER — MAGNESIUM OXIDE/MAG AA CHELATE 300 MG
1 CAPSULE ORAL AS DIRECTED
COMMUNITY

## 2022-05-19 RX ORDER — CYCLOBENZAPRINE HCL 10 MG
10 TABLET ORAL 3 TIMES DAILY PRN
COMMUNITY
Start: 2022-02-04 | End: 2022-05-19

## 2022-05-24 ENCOUNTER — TELEPHONE (OUTPATIENT)
Dept: OTOLARYNGOLOGY | Facility: CLINIC | Age: 67
End: 2022-05-24

## 2022-05-24 ENCOUNTER — LAB ENCOUNTER (OUTPATIENT)
Dept: LAB | Age: 67
End: 2022-05-24
Attending: OTOLARYNGOLOGY
Payer: MEDICARE

## 2022-05-24 ENCOUNTER — HOSPITAL ENCOUNTER (OUTPATIENT)
Dept: MRI IMAGING | Age: 67
Discharge: HOME | End: 2022-05-24
Attending: OTOLARYNGOLOGY
Payer: MEDICARE

## 2022-05-24 DIAGNOSIS — H25.10 NUCLEAR SENILE CATARACT, UNSPECIFIED LATERALITY: ICD-10-CM

## 2022-05-24 DIAGNOSIS — M54.2 NECK PAIN: ICD-10-CM

## 2022-05-24 DIAGNOSIS — R39.15 URINARY URGENCY: ICD-10-CM

## 2022-05-24 DIAGNOSIS — N95.2 VAGINAL ATROPHY: ICD-10-CM

## 2022-05-24 DIAGNOSIS — F41.9 ANXIETY: ICD-10-CM

## 2022-05-24 DIAGNOSIS — Z00.00 ROUTINE MEDICAL EXAM: ICD-10-CM

## 2022-05-24 DIAGNOSIS — M85.80 OSTEOPENIA, UNSPECIFIED LOCATION: ICD-10-CM

## 2022-05-24 DIAGNOSIS — R79.0 LOW FERRITIN: ICD-10-CM

## 2022-05-24 DIAGNOSIS — B37.9 CANDIDIASIS: ICD-10-CM

## 2022-05-24 DIAGNOSIS — H16.229 KERATOCONJUNCTIVITIS SICCA, NOT SPECIFIED AS SJOGREN'S, UNSPECIFIED LATERALITY: ICD-10-CM

## 2022-05-24 DIAGNOSIS — H90.3 SNHL (SENSORY-NEURAL HEARING LOSS), ASYMMETRICAL: ICD-10-CM

## 2022-05-24 DIAGNOSIS — H91.92 HEARING LOSS OF LEFT EAR, UNSPECIFIED HEARING LOSS TYPE: ICD-10-CM

## 2022-05-24 DIAGNOSIS — Z78.0 POSTMENOPAUSAL: ICD-10-CM

## 2022-05-24 DIAGNOSIS — H43.819 VITREOUS DEGENERATION, UNSPECIFIED LATERALITY: ICD-10-CM

## 2022-05-24 LAB
ALBUMIN SERPL-MCNC: 3.9 G/DL (ref 3.4–5)
ALBUMIN/GLOB SERPL: 1.3 {RATIO} (ref 1–2)
ALP LIVER SERPL-CCNC: 44 U/L
ALT SERPL-CCNC: 20 U/L
ANION GAP SERPL CALC-SCNC: 5 MMOL/L (ref 0–18)
AST SERPL-CCNC: 18 U/L (ref 15–37)
BASOPHILS # BLD AUTO: 0.05 X10(3) UL (ref 0–0.2)
BASOPHILS NFR BLD AUTO: 1.5 %
BILIRUB SERPL-MCNC: 0.9 MG/DL (ref 0.1–2)
BUN BLD-MCNC: 11 MG/DL (ref 7–18)
BUN/CREAT SERPL: 20 (ref 10–20)
CALCIUM BLD-MCNC: 9.4 MG/DL (ref 8.5–10.1)
CHLORIDE SERPL-SCNC: 110 MMOL/L (ref 98–112)
CHOLEST SERPL-MCNC: 195 MG/DL (ref ?–200)
CO2 SERPL-SCNC: 28 MMOL/L (ref 21–32)
CREAT BLD-MCNC: 0.55 MG/DL
DEPRECATED HBV CORE AB SER IA-ACNC: 35.1 NG/ML
DEPRECATED RDW RBC AUTO: 44.5 FL (ref 35.1–46.3)
EOSINOPHIL # BLD AUTO: 0.09 X10(3) UL (ref 0–0.7)
EOSINOPHIL NFR BLD AUTO: 2.8 %
ERYTHROCYTE [DISTWIDTH] IN BLOOD BY AUTOMATED COUNT: 12.3 % (ref 11–15)
FASTING PATIENT LIPID ANSWER: YES
FASTING STATUS PATIENT QL REPORTED: YES
GLOBULIN PLAS-MCNC: 3 G/DL (ref 2.8–4.4)
GLUCOSE BLD-MCNC: 93 MG/DL (ref 70–99)
HCT VFR BLD AUTO: 37.4 %
HDLC SERPL-MCNC: 98 MG/DL (ref 40–59)
HGB BLD-MCNC: 12.1 G/DL
IMM GRANULOCYTES # BLD AUTO: 0.01 X10(3) UL (ref 0–1)
IMM GRANULOCYTES NFR BLD: 0.3 %
LDLC SERPL CALC-MCNC: 88 MG/DL (ref ?–100)
LYMPHOCYTES # BLD AUTO: 1.19 X10(3) UL (ref 1–4)
LYMPHOCYTES NFR BLD AUTO: 36.4 %
MCH RBC QN AUTO: 31.7 PG (ref 26–34)
MCHC RBC AUTO-ENTMCNC: 32.4 G/DL (ref 31–37)
MCV RBC AUTO: 97.9 FL
MONOCYTES # BLD AUTO: 0.32 X10(3) UL (ref 0.1–1)
MONOCYTES NFR BLD AUTO: 9.8 %
NEUTROPHILS # BLD AUTO: 1.61 X10 (3) UL (ref 1.5–7.7)
NEUTROPHILS # BLD AUTO: 1.61 X10(3) UL (ref 1.5–7.7)
NEUTROPHILS NFR BLD AUTO: 49.2 %
NONHDLC SERPL-MCNC: 97 MG/DL (ref ?–130)
OSMOLALITY SERPL CALC.SUM OF ELEC: 295 MOSM/KG (ref 275–295)
PLATELET # BLD AUTO: 219 10(3)UL (ref 150–450)
POTASSIUM SERPL-SCNC: 4.4 MMOL/L (ref 3.5–5.1)
PROT SERPL-MCNC: 6.9 G/DL (ref 6.4–8.2)
RBC # BLD AUTO: 3.82 X10(6)UL
SODIUM SERPL-SCNC: 143 MMOL/L (ref 136–145)
TRIGL SERPL-MCNC: 44 MG/DL (ref 30–149)
TSI SER-ACNC: 0.97 MIU/ML (ref 0.36–3.74)
VIT D+METAB SERPL-MCNC: 30.5 NG/ML (ref 30–100)
VLDLC SERPL CALC-MCNC: 7 MG/DL (ref 0–30)
WBC # BLD AUTO: 3.3 X10(3) UL (ref 4–11)

## 2022-05-24 PROCEDURE — 80053 COMPREHEN METABOLIC PANEL: CPT

## 2022-05-24 PROCEDURE — A9575 INJ GADOTERATE MEGLUMI 0.1ML: HCPCS | Performed by: OTOLARYNGOLOGY

## 2022-05-24 PROCEDURE — 36415 COLL VENOUS BLD VENIPUNCTURE: CPT

## 2022-05-24 PROCEDURE — 86628 CANDIDA ANTIBODY: CPT

## 2022-05-24 PROCEDURE — 70553 MRI BRAIN STEM W/O & W/DYE: CPT | Performed by: OTOLARYNGOLOGY

## 2022-05-24 PROCEDURE — 85025 COMPLETE CBC W/AUTO DIFF WBC: CPT

## 2022-05-24 PROCEDURE — 80061 LIPID PANEL: CPT

## 2022-05-24 PROCEDURE — 82306 VITAMIN D 25 HYDROXY: CPT

## 2022-05-24 PROCEDURE — 82728 ASSAY OF FERRITIN: CPT

## 2022-05-24 PROCEDURE — 84443 ASSAY THYROID STIM HORMONE: CPT

## 2022-05-24 RX ORDER — LORAZEPAM 1 MG/1
1 TABLET ORAL ONCE
Qty: 1 TABLET | Refills: 0 | Status: CANCELLED | OUTPATIENT
Start: 2022-05-24 | End: 2022-05-24

## 2022-05-24 RX ORDER — DIAZEPAM 5 MG/1
5 TABLET ORAL ONCE
Qty: 1 TABLET | Refills: 0 | Status: SHIPPED | OUTPATIENT
Start: 2022-05-24 | End: 2022-05-24

## 2022-05-24 NOTE — TELEPHONE ENCOUNTER
Pt asking if she can get rx sent this morning for valium - she has MRI this afternoon - discussed at last appt

## 2022-05-26 LAB
CANDIDA ANTIBODY IGA: 1.24 EV
CANDIDA ANTIBODY IGG: 1.27 EV
CANDIDA ANTIBODY IGM: 0.72 EV

## 2022-06-12 ENCOUNTER — HOSPITAL ENCOUNTER (OUTPATIENT)
Age: 67
Discharge: HOME | End: 2022-06-12
Payer: MEDICARE

## 2022-06-12 LAB — SARS-COV-2 RNA RESP QL NAA+PROBE: NOT DETECTED

## 2022-06-17 ENCOUNTER — OFFICE VISIT (OUTPATIENT)
Dept: OTOLARYNGOLOGY | Facility: CLINIC | Age: 67
End: 2022-06-17
Payer: MEDICARE

## 2022-06-17 ENCOUNTER — OFFICE VISIT (OUTPATIENT)
Dept: AUDIOLOGY | Facility: CLINIC | Age: 67
End: 2022-06-17
Payer: MEDICARE

## 2022-06-17 VITALS — HEIGHT: 62 IN | BODY MASS INDEX: 20.24 KG/M2 | WEIGHT: 110 LBS

## 2022-06-17 DIAGNOSIS — H91.90 HEARING LOSS, UNSPECIFIED HEARING LOSS TYPE, UNSPECIFIED LATERALITY: Primary | ICD-10-CM

## 2022-06-17 DIAGNOSIS — H90.3 ASYMMETRICAL SENSORINEURAL HEARING LOSS: Primary | ICD-10-CM

## 2022-06-17 PROCEDURE — 99213 OFFICE O/P EST LOW 20 MIN: CPT | Performed by: OTOLARYNGOLOGY

## 2022-06-17 PROCEDURE — 92552 PURE TONE AUDIOMETRY AIR: CPT | Performed by: AUDIOLOGIST

## 2022-06-18 ENCOUNTER — TELEPHONE (OUTPATIENT)
Dept: INTEGRATIVE MEDICINE | Facility: CLINIC | Age: 67
End: 2022-06-18

## 2022-06-18 NOTE — TELEPHONE ENCOUNTER
Patient attempted to schedule ultrasound, the central scheduling department advised her the order needs to be changed - currently ordered as ultrasound ankle? Patient has a mass on left arm - Please clarify and patient requesting call back when completed. Thank you!

## 2022-06-18 NOTE — TELEPHONE ENCOUNTER
Spoke with patient - clarified order which does say \" left upper limb\". Pt stated that she will call central scheduling back to try and schedule. Advised patient to call back Monday if she is still having issues. Pt verbalized understanding.

## 2022-07-06 ENCOUNTER — HOSPITAL ENCOUNTER (OUTPATIENT)
Dept: BONE DENSITY | Facility: HOSPITAL | Age: 67
Discharge: HOME | End: 2022-07-06
Attending: FAMILY MEDICINE
Payer: MEDICARE

## 2022-07-06 DIAGNOSIS — Z78.0 POSTMENOPAUSAL: ICD-10-CM

## 2022-07-06 PROCEDURE — 77080 DXA BONE DENSITY AXIAL: CPT | Performed by: FAMILY MEDICINE

## 2022-07-11 ENCOUNTER — IMMUNIZATION (OUTPATIENT)
Dept: LAB | Age: 67
End: 2022-07-11
Attending: EMERGENCY MEDICINE
Payer: MEDICARE

## 2022-07-11 DIAGNOSIS — Z23 NEED FOR VACCINATION: Primary | ICD-10-CM

## 2022-07-11 PROCEDURE — 0094A SARSCOV2 VAC 50MCG/0.5ML IM: CPT

## 2022-07-15 ENCOUNTER — HOSPITAL ENCOUNTER (OUTPATIENT)
Dept: ULTRASOUND IMAGING | Facility: HOSPITAL | Age: 67
Discharge: HOME | End: 2022-07-15
Attending: FAMILY MEDICINE
Payer: MEDICARE

## 2022-07-15 DIAGNOSIS — L72.9 SUBCUTANEOUS CYST: ICD-10-CM

## 2022-07-15 DIAGNOSIS — R22.32 LOCALIZED SWELLING, MASS AND LUMP, LEFT UPPER LIMB: ICD-10-CM

## 2022-07-15 PROCEDURE — 76882 US LMTD JT/FCL EVL NVASC XTR: CPT | Performed by: FAMILY MEDICINE

## 2022-08-01 DIAGNOSIS — R22.32 LOCALIZED SWELLING, MASS AND LUMP, LEFT UPPER LIMB: Primary | ICD-10-CM

## 2022-08-23 ENCOUNTER — TELEPHONE (OUTPATIENT)
Dept: INTEGRATIVE MEDICINE | Facility: CLINIC | Age: 67
End: 2022-08-23

## 2022-08-23 NOTE — TELEPHONE ENCOUNTER
Patient stated she was waiting for call from our office advising her upon the referral for surgeon. Please clarify if she still needs to wait to hear from our office or if she can proceed to call to make an appointment.      Thank you

## 2022-08-23 NOTE — TELEPHONE ENCOUNTER
Patient calling stating she was to be notified of a surgeon's name/ referral for the L Arm mass to be evaluated/ treated and had not heard back. Referral to Dr Glenn Dixon Surgeon was issued on 8/1/22 by Dr Tiffanie Davis. Pongrt message sent to patient to notify her and PH# provided to schedule an appt.

## 2022-08-26 ENCOUNTER — OFFICE VISIT (OUTPATIENT)
Dept: SURGERY | Facility: CLINIC | Age: 67
End: 2022-08-26
Payer: MEDICARE

## 2022-08-26 VITALS — WEIGHT: 110 LBS | HEIGHT: 62 IN | BODY MASS INDEX: 20.24 KG/M2

## 2022-08-26 DIAGNOSIS — R22.32 LOCALIZED SWELLING, MASS, OR LUMP OF UPPER EXTREMITY, LEFT: Primary | ICD-10-CM

## 2022-08-26 PROCEDURE — 99204 OFFICE O/P NEW MOD 45 MIN: CPT | Performed by: SURGERY

## 2022-09-08 ENCOUNTER — HOSPITAL ENCOUNTER (OUTPATIENT)
Dept: MRI IMAGING | Age: 67
Discharge: HOME | End: 2022-09-08
Attending: SURGERY
Payer: MEDICARE

## 2022-09-08 DIAGNOSIS — R22.32 LOCALIZED SWELLING, MASS, OR LUMP OF UPPER EXTREMITY, LEFT: ICD-10-CM

## 2022-09-08 PROCEDURE — A9575 INJ GADOTERATE MEGLUMI 0.1ML: HCPCS | Performed by: SURGERY

## 2022-09-08 PROCEDURE — 73220 MRI UPPR EXTREMITY W/O&W/DYE: CPT | Performed by: SURGERY

## 2022-10-20 ENCOUNTER — HOSPITAL ENCOUNTER (OUTPATIENT)
Dept: GENERAL RADIOLOGY | Age: 67
Discharge: HOME | End: 2022-10-20
Attending: FAMILY MEDICINE
Payer: MEDICARE

## 2022-10-20 ENCOUNTER — OFFICE VISIT (OUTPATIENT)
Dept: INTEGRATIVE MEDICINE | Facility: CLINIC | Age: 67
End: 2022-10-20
Payer: MEDICARE

## 2022-10-20 VITALS
HEART RATE: 68 BPM | BODY MASS INDEX: 21 KG/M2 | DIASTOLIC BLOOD PRESSURE: 72 MMHG | SYSTOLIC BLOOD PRESSURE: 106 MMHG | OXYGEN SATURATION: 99 % | WEIGHT: 116 LBS

## 2022-10-20 DIAGNOSIS — M81.0 OSTEOPOROSIS, UNSPECIFIED OSTEOPOROSIS TYPE, UNSPECIFIED PATHOLOGICAL FRACTURE PRESENCE: ICD-10-CM

## 2022-10-20 DIAGNOSIS — M54.50 ACUTE RIGHT-SIDED LOW BACK PAIN WITHOUT SCIATICA: ICD-10-CM

## 2022-10-20 DIAGNOSIS — M54.50 ACUTE RIGHT-SIDED LOW BACK PAIN WITHOUT SCIATICA: Primary | ICD-10-CM

## 2022-10-20 PROCEDURE — 99214 OFFICE O/P EST MOD 30 MIN: CPT | Performed by: FAMILY MEDICINE

## 2022-10-20 PROCEDURE — 72110 X-RAY EXAM L-2 SPINE 4/>VWS: CPT | Performed by: FAMILY MEDICINE

## 2022-10-20 RX ORDER — CYCLOBENZAPRINE HCL 5 MG
5 TABLET ORAL 3 TIMES DAILY PRN
Qty: 21 TABLET | Refills: 0 | Status: SHIPPED | OUTPATIENT
Start: 2022-10-20 | End: 2022-10-27

## 2022-10-20 RX ORDER — ETODOLAC 400 MG/1
400 TABLET, FILM COATED ORAL 2 TIMES DAILY PRN
Qty: 28 TABLET | Refills: 0 | Status: SHIPPED | OUTPATIENT
Start: 2022-10-20 | End: 2022-11-03

## 2022-11-10 ENCOUNTER — APPOINTMENT (OUTPATIENT)
Dept: GENERAL RADIOLOGY | Age: 67
End: 2022-11-10
Attending: NURSE PRACTITIONER
Payer: MEDICARE

## 2022-11-10 ENCOUNTER — HOSPITAL ENCOUNTER (OUTPATIENT)
Age: 67
Discharge: HOME | End: 2022-11-10
Payer: MEDICARE

## 2022-11-10 VITALS
HEART RATE: 64 BPM | OXYGEN SATURATION: 100 % | DIASTOLIC BLOOD PRESSURE: 67 MMHG | RESPIRATION RATE: 18 BRPM | TEMPERATURE: 99 F | SYSTOLIC BLOOD PRESSURE: 124 MMHG

## 2022-11-10 DIAGNOSIS — S99.922A INJURY OF TOE ON LEFT FOOT, INITIAL ENCOUNTER: Primary | ICD-10-CM

## 2022-11-10 PROCEDURE — 73660 X-RAY EXAM OF TOE(S): CPT | Performed by: NURSE PRACTITIONER

## 2022-11-10 NOTE — DISCHARGE INSTRUCTIONS
Rest from exacerbating activities for the next week. Apply ice/cold compress for 20min at a time 4-6x daily for the next 2-3 days. Keep the left foot elevated when resting as much as possible. You may take Motrin every 6 hours as needed for pain. Take this with food. If additional pain control is needed, you may also take Tylenol every 6 hours. Follow up with your primary provider or the orthopedic specialist provided in your discharge instructions in the next 2-3 days. Seek additional care in the ER for new or worsening symptoms, fever or increasing pain.

## 2022-11-19 ENCOUNTER — NURSE ONLY (OUTPATIENT)
Dept: FAMILY MEDICINE CLINIC | Facility: CLINIC | Age: 67
End: 2022-11-19
Payer: MEDICARE

## 2022-11-19 DIAGNOSIS — Z23 NEED FOR VACCINATION: Primary | ICD-10-CM

## 2022-11-19 PROCEDURE — G0008 ADMIN INFLUENZA VIRUS VAC: HCPCS | Performed by: FAMILY MEDICINE

## 2022-11-19 PROCEDURE — 90662 IIV NO PRSV INCREASED AG IM: CPT | Performed by: FAMILY MEDICINE

## 2023-01-19 ENCOUNTER — OFFICE VISIT (OUTPATIENT)
Dept: INTEGRATIVE MEDICINE | Facility: CLINIC | Age: 68
End: 2023-01-19
Payer: MEDICARE

## 2023-01-19 VITALS
DIASTOLIC BLOOD PRESSURE: 70 MMHG | OXYGEN SATURATION: 98 % | SYSTOLIC BLOOD PRESSURE: 108 MMHG | BODY MASS INDEX: 22 KG/M2 | HEART RATE: 67 BPM | WEIGHT: 117.63 LBS

## 2023-01-19 DIAGNOSIS — M19.90 OSTEOARTHRITIS, UNSPECIFIED OSTEOARTHRITIS TYPE, UNSPECIFIED SITE: ICD-10-CM

## 2023-01-19 DIAGNOSIS — M54.2 NECK PAIN: ICD-10-CM

## 2023-01-19 DIAGNOSIS — R07.89 ATYPICAL CHEST PAIN: Primary | ICD-10-CM

## 2023-01-19 PROCEDURE — 93000 ELECTROCARDIOGRAM COMPLETE: CPT | Performed by: FAMILY MEDICINE

## 2023-01-19 PROCEDURE — 99214 OFFICE O/P EST MOD 30 MIN: CPT | Performed by: FAMILY MEDICINE

## 2023-03-24 ENCOUNTER — HOSPITAL ENCOUNTER (OUTPATIENT)
Dept: CV DIAGNOSTICS | Facility: HOSPITAL | Age: 68
Discharge: HOME | End: 2023-03-24
Attending: FAMILY MEDICINE
Payer: MEDICARE

## 2023-03-24 DIAGNOSIS — R07.89 ATYPICAL CHEST PAIN: ICD-10-CM

## 2023-03-24 LAB
% OF MAX PREDICTED HR: 100 %
MAX DIASTOLIC BP: 53 MMHG
MAX HEART RATE: 160 BPM
MAX PREDICTED HEART RATE: 154 BPM
MAX SYSTOLIC BP: 161 MMHG
MAX WORK LOAD: 101

## 2023-03-24 PROCEDURE — 93350 STRESS TTE ONLY: CPT | Performed by: FAMILY MEDICINE

## 2023-03-24 PROCEDURE — 93016 CV STRESS TEST SUPVJ ONLY: CPT | Performed by: STUDENT IN AN ORGANIZED HEALTH CARE EDUCATION/TRAINING PROGRAM

## 2023-03-24 PROCEDURE — 93018 CV STRESS TEST I&R ONLY: CPT | Performed by: STUDENT IN AN ORGANIZED HEALTH CARE EDUCATION/TRAINING PROGRAM

## 2023-03-24 PROCEDURE — 93017 CV STRESS TEST TRACING ONLY: CPT | Performed by: FAMILY MEDICINE

## 2023-03-28 DIAGNOSIS — R07.89 ATYPICAL CHEST PAIN: Primary | ICD-10-CM

## 2023-03-28 DIAGNOSIS — R94.31 ABNORMAL ECG DURING EXERCISE STRESS TEST: ICD-10-CM

## 2023-03-31 ENCOUNTER — TELEPHONE (OUTPATIENT)
Dept: INTEGRATIVE MEDICINE | Facility: CLINIC | Age: 68
End: 2023-03-31

## 2023-03-31 NOTE — TELEPHONE ENCOUNTER
RN called and spoke with patient. Patient wanted clarification on Stress Echo results. RN gave patient clarification on test results- advised patient to follow up with cardiology. Patient states that she has a cardiology appointment on Tuesday with Dr. Tristan Valenzuela MD  In [place].     RN will faxed stress echo results to Dr. Cielo Velasquez so that they can review during appointment per pt request.     Patient states that she has chest pain intermittently \"that comes and goes\" and some \"earlier in our conversation\" but is fine now. RN instructed patient to seek care at ED for any chest pain for assessment and evaluation. Patient stated that Russ Lim will keep this in mind\"     Education given on chest pain and recommendation for ED evaluation for any chest pain.

## 2023-04-26 ENCOUNTER — HOSPITAL ENCOUNTER (OUTPATIENT)
Age: 68
Discharge: HOME | End: 2023-04-26
Payer: MEDICARE

## 2023-04-26 VITALS
TEMPERATURE: 99 F | DIASTOLIC BLOOD PRESSURE: 75 MMHG | SYSTOLIC BLOOD PRESSURE: 108 MMHG | OXYGEN SATURATION: 100 % | HEART RATE: 78 BPM | RESPIRATION RATE: 18 BRPM

## 2023-04-26 DIAGNOSIS — H57.12 LEFT EYE PAIN: Primary | ICD-10-CM

## 2023-04-26 PROCEDURE — 99213 OFFICE O/P EST LOW 20 MIN: CPT | Performed by: NURSE PRACTITIONER

## 2023-04-26 RX ORDER — ERYTHROMYCIN 5 MG/G
1 OINTMENT OPHTHALMIC EVERY 6 HOURS
Qty: 1 G | Refills: 0 | Status: SHIPPED | OUTPATIENT
Start: 2023-04-26 | End: 2023-05-03

## 2023-04-26 RX ORDER — ALENDRONATE SODIUM 70 MG/1
1 TABLET ORAL WEEKLY
Qty: 4 TABLET | Refills: 12 | COMMUNITY
Start: 2023-02-13 | End: 2024-02-28

## 2023-04-26 RX ORDER — PURIFIED WATER 986 MG/ML
1 SOLUTION OPHTHALMIC ONCE
Status: COMPLETED | OUTPATIENT
Start: 2023-04-26 | End: 2023-04-26

## 2023-04-26 RX ORDER — TETRACAINE HYDROCHLORIDE 5 MG/ML
1 SOLUTION OPHTHALMIC ONCE
Status: COMPLETED | OUTPATIENT
Start: 2023-04-26 | End: 2023-04-26

## 2023-04-26 NOTE — Clinical Note
9:30 am with Dr. Natalie Whitehead  [address]  6th floor [address]

## 2023-04-26 NOTE — DISCHARGE INSTRUCTIONS
Follow up with Dr. Rosemarie Will tomorrow morning at 09:30am  [address] 6th floor [address]     Erythromycin as prescribed    Go to ER with any changes or worsening.

## 2023-04-26 NOTE — ED INITIAL ASSESSMENT (HPI)
Pt c/o L eye pain x3 days, redness since this. No injury. Pt wears glasses. No contact use. No drainage.

## 2023-05-15 ENCOUNTER — MED REC SCAN ONLY (OUTPATIENT)
Dept: INTEGRATIVE MEDICINE | Facility: CLINIC | Age: 68
End: 2023-05-15

## 2023-05-18 ENCOUNTER — OFFICE VISIT (OUTPATIENT)
Dept: INTEGRATIVE MEDICINE | Facility: CLINIC | Age: 68
End: 2023-05-18
Payer: MEDICARE

## 2023-05-18 VITALS
WEIGHT: 116 LBS | OXYGEN SATURATION: 97 % | SYSTOLIC BLOOD PRESSURE: 100 MMHG | BODY MASS INDEX: 21 KG/M2 | HEART RATE: 67 BPM | DIASTOLIC BLOOD PRESSURE: 60 MMHG

## 2023-05-18 DIAGNOSIS — K59.00 CONSTIPATION, UNSPECIFIED CONSTIPATION TYPE: Primary | ICD-10-CM

## 2023-05-18 DIAGNOSIS — R07.89 ATYPICAL CHEST PAIN: ICD-10-CM

## 2023-05-18 DIAGNOSIS — S76.312D HAMSTRING STRAIN, LEFT, SUBSEQUENT ENCOUNTER: ICD-10-CM

## 2023-05-18 PROCEDURE — 99214 OFFICE O/P EST MOD 30 MIN: CPT | Performed by: FAMILY MEDICINE

## 2023-05-21 ENCOUNTER — HOSPITAL ENCOUNTER (OUTPATIENT)
Dept: CT IMAGING | Age: 68
Discharge: HOME | End: 2023-05-21
Attending: FAMILY MEDICINE

## 2023-05-21 DIAGNOSIS — R07.89 ATYPICAL CHEST PAIN: ICD-10-CM

## 2023-05-30 ENCOUNTER — OFFICE VISIT (OUTPATIENT)
Dept: INTEGRATIVE MEDICINE | Facility: CLINIC | Age: 68
End: 2023-05-30
Payer: MEDICARE

## 2023-05-30 VITALS
DIASTOLIC BLOOD PRESSURE: 64 MMHG | BODY MASS INDEX: 21.71 KG/M2 | WEIGHT: 118 LBS | OXYGEN SATURATION: 97 % | SYSTOLIC BLOOD PRESSURE: 100 MMHG | HEART RATE: 67 BPM | HEIGHT: 62 IN

## 2023-05-30 DIAGNOSIS — R19.4 CHANGE IN STOOL HABITS: ICD-10-CM

## 2023-05-30 DIAGNOSIS — H16.223 KERATOCONJUNCTIVITIS SICCA OF BOTH EYES NOT SPECIFIED AS SJOGREN'S: ICD-10-CM

## 2023-05-30 DIAGNOSIS — M54.2 NECK PAIN: ICD-10-CM

## 2023-05-30 DIAGNOSIS — M81.0 OSTEOPOROSIS, UNSPECIFIED OSTEOPOROSIS TYPE, UNSPECIFIED PATHOLOGICAL FRACTURE PRESENCE: ICD-10-CM

## 2023-05-30 DIAGNOSIS — R79.0 LOW FERRITIN: ICD-10-CM

## 2023-05-30 DIAGNOSIS — Z00.00 ROUTINE MEDICAL EXAM: Primary | ICD-10-CM

## 2023-05-30 DIAGNOSIS — H90.3 SNHL (SENSORY-NEURAL HEARING LOSS), ASYMMETRICAL: ICD-10-CM

## 2023-05-30 DIAGNOSIS — N95.2 VAGINAL ATROPHY: ICD-10-CM

## 2023-05-30 DIAGNOSIS — Z78.0 POSTMENOPAUSAL: ICD-10-CM

## 2023-05-30 DIAGNOSIS — E55.9 VITAMIN D DEFICIENCY: ICD-10-CM

## 2023-05-30 DIAGNOSIS — B37.9 CANDIDIASIS: ICD-10-CM

## 2023-05-30 DIAGNOSIS — H25.10 NUCLEAR SENILE CATARACT, UNSPECIFIED LATERALITY: ICD-10-CM

## 2023-05-30 DIAGNOSIS — R39.15 URINARY URGENCY: ICD-10-CM

## 2023-05-30 PROBLEM — H43.819 VITREOUS DEGENERATION: Status: RESOLVED | Noted: 2022-05-19 | Resolved: 2023-05-30

## 2023-05-30 PROCEDURE — G0438 PPPS, INITIAL VISIT: HCPCS | Performed by: PHYSICIAN ASSISTANT

## 2023-05-30 PROCEDURE — 1125F AMNT PAIN NOTED PAIN PRSNT: CPT | Performed by: PHYSICIAN ASSISTANT

## 2023-05-30 NOTE — PATIENT INSTRUCTIONS
Turmeric for inflammation and pain:  -Dai Herbs Turmeric Supreme, OR  -MegaFood Turmeric Curcumin Extra Strength, OR  -[employer] Mykind Extra Strength Turmeric  Take as directed    *See this study in regards to taking turmeric with Fosamax:  https://pubmed.ncbi.nlm.nih.gov/90648474/      Integrative Gastroenterologist:  Dr. Suzanne Herman  [email or web address]  [phone]

## 2023-06-16 ENCOUNTER — OFFICE VISIT (OUTPATIENT)
Dept: OTOLARYNGOLOGY | Facility: CLINIC | Age: 68
End: 2023-06-16
Payer: MEDICARE

## 2023-06-16 ENCOUNTER — OFFICE VISIT (OUTPATIENT)
Dept: AUDIOLOGY | Facility: CLINIC | Age: 68
End: 2023-06-16

## 2023-06-16 DIAGNOSIS — H91.90 HEARING LOSS, UNSPECIFIED HEARING LOSS TYPE, UNSPECIFIED LATERALITY: Primary | ICD-10-CM

## 2023-06-16 DIAGNOSIS — H90.3 SENSORINEURAL HEARING LOSS, BILATERAL: ICD-10-CM

## 2023-06-16 DIAGNOSIS — H90.3 SENSORINEURAL HEARING LOSS (SNHL), BILATERAL: Primary | ICD-10-CM

## 2023-06-16 PROCEDURE — 92557 COMPREHENSIVE HEARING TEST: CPT | Performed by: AUDIOLOGIST

## 2023-06-16 PROCEDURE — 99213 OFFICE O/P EST LOW 20 MIN: CPT | Performed by: OTOLARYNGOLOGY

## 2023-06-16 PROCEDURE — 92567 TYMPANOMETRY: CPT | Performed by: AUDIOLOGIST

## 2023-06-21 ENCOUNTER — TELEPHONE (OUTPATIENT)
Dept: INTEGRATIVE MEDICINE | Facility: CLINIC | Age: 68
End: 2023-06-21

## 2023-06-21 DIAGNOSIS — Z00.00 ROUTINE MEDICAL EXAM: Primary | ICD-10-CM

## 2023-06-23 NOTE — TELEPHONE ENCOUNTER
RN s/w patient. Patient advised to fast for labs, lipid panel placed, and that she may go to an [facility] lab to have them done.

## 2023-06-26 ENCOUNTER — LAB ENCOUNTER (OUTPATIENT)
Dept: LAB | Facility: HOSPITAL | Age: 68
End: 2023-06-26
Attending: PHYSICIAN ASSISTANT
Payer: MEDICARE

## 2023-06-26 DIAGNOSIS — E55.9 VITAMIN D DEFICIENCY: ICD-10-CM

## 2023-06-26 DIAGNOSIS — B37.9 CANDIDIASIS: ICD-10-CM

## 2023-06-26 DIAGNOSIS — R19.7 DIARRHEA: Primary | ICD-10-CM

## 2023-06-26 DIAGNOSIS — Z00.00 ROUTINE MEDICAL EXAM: ICD-10-CM

## 2023-06-26 LAB
ALBUMIN SERPL-MCNC: 4 G/DL (ref 3.4–5)
ALBUMIN/GLOB SERPL: 1.2 {RATIO} (ref 1–2)
ALP LIVER SERPL-CCNC: 40 U/L
ALT SERPL-CCNC: 23 U/L
ANION GAP SERPL CALC-SCNC: 4 MMOL/L (ref 0–18)
AST SERPL-CCNC: 21 U/L (ref 15–37)
BASOPHILS # BLD AUTO: 0.02 X10(3) UL (ref 0–0.2)
BASOPHILS NFR BLD AUTO: 0.7 %
BILIRUB SERPL-MCNC: 0.7 MG/DL (ref 0.1–2)
BUN BLD-MCNC: 10 MG/DL (ref 7–18)
BUN/CREAT SERPL: 18.9 (ref 10–20)
CALCIUM BLD-MCNC: 9 MG/DL (ref 8.5–10.1)
CHLORIDE SERPL-SCNC: 109 MMOL/L (ref 98–112)
CHOLEST SERPL-MCNC: 206 MG/DL (ref ?–200)
CO2 SERPL-SCNC: 29 MMOL/L (ref 21–32)
CREAT BLD-MCNC: 0.53 MG/DL
DEPRECATED HBV CORE AB SER IA-ACNC: 44.6 NG/ML
DEPRECATED RDW RBC AUTO: 43 FL (ref 35.1–46.3)
EOSINOPHIL # BLD AUTO: 0.05 X10(3) UL (ref 0–0.7)
EOSINOPHIL NFR BLD AUTO: 1.7 %
ERYTHROCYTE [DISTWIDTH] IN BLOOD BY AUTOMATED COUNT: 12.3 % (ref 11–15)
EST. AVERAGE GLUCOSE BLD GHB EST-MCNC: 100 MG/DL (ref 68–126)
FASTING PATIENT LIPID ANSWER: YES
FASTING STATUS PATIENT QL REPORTED: YES
FOLATE SERPL-MCNC: 17.4 NG/ML (ref 8.7–?)
GFR SERPLBLD BASED ON 1.73 SQ M-ARVRAT: 102 ML/MIN/1.73M2 (ref 60–?)
GLOBULIN PLAS-MCNC: 3.4 G/DL (ref 2.8–4.4)
GLUCOSE BLD-MCNC: 96 MG/DL (ref 70–99)
HBA1C MFR BLD: 5.1 % (ref ?–5.7)
HCT VFR BLD AUTO: 39.5 %
HDLC SERPL-MCNC: 98 MG/DL (ref 40–59)
HGB BLD-MCNC: 13 G/DL
IGA SERPL-MCNC: 88 MG/DL (ref 70–312)
IMM GRANULOCYTES # BLD AUTO: 0 X10(3) UL (ref 0–1)
IMM GRANULOCYTES NFR BLD: 0 %
IRON SATN MFR SERPL: 9 %
IRON SERPL-MCNC: 34 UG/DL
LDLC SERPL CALC-MCNC: 98 MG/DL (ref ?–100)
LYMPHOCYTES # BLD AUTO: 0.69 X10(3) UL (ref 1–4)
LYMPHOCYTES NFR BLD AUTO: 23.3 %
MCH RBC QN AUTO: 31.2 PG (ref 26–34)
MCHC RBC AUTO-ENTMCNC: 32.9 G/DL (ref 31–37)
MCV RBC AUTO: 94.7 FL
MONOCYTES # BLD AUTO: 0.34 X10(3) UL (ref 0.1–1)
MONOCYTES NFR BLD AUTO: 11.5 %
NEUTROPHILS # BLD AUTO: 1.86 X10 (3) UL (ref 1.5–7.7)
NEUTROPHILS # BLD AUTO: 1.86 X10(3) UL (ref 1.5–7.7)
NEUTROPHILS NFR BLD AUTO: 62.8 %
NONHDLC SERPL-MCNC: 108 MG/DL (ref ?–130)
OSMOLALITY SERPL CALC.SUM OF ELEC: 293 MOSM/KG (ref 275–295)
PLATELET # BLD AUTO: 211 10(3)UL (ref 150–450)
POTASSIUM SERPL-SCNC: 4 MMOL/L (ref 3.5–5.1)
PROT SERPL-MCNC: 7.4 G/DL (ref 6.4–8.2)
RBC # BLD AUTO: 4.17 X10(6)UL
SODIUM SERPL-SCNC: 142 MMOL/L (ref 136–145)
T3FREE SERPL-MCNC: 2.66 PG/ML (ref 2.4–4.2)
T4 FREE SERPL-MCNC: 1 NG/DL (ref 0.8–1.7)
TIBC SERPL-MCNC: 374 UG/DL (ref 240–450)
TRANSFERRIN SERPL-MCNC: 251 MG/DL (ref 200–360)
TRIGL SERPL-MCNC: 55 MG/DL (ref 30–149)
TSI SER-ACNC: 0.95 MIU/ML (ref 0.36–3.74)
VIT B12 SERPL-MCNC: 323 PG/ML (ref 193–986)
VIT D+METAB SERPL-MCNC: 42 NG/ML (ref 30–100)
VLDLC SERPL CALC-MCNC: 9 MG/DL (ref 0–30)
WBC # BLD AUTO: 3 X10(3) UL (ref 4–11)

## 2023-06-26 PROCEDURE — 86628 CANDIDA ANTIBODY: CPT

## 2023-06-26 PROCEDURE — 83540 ASSAY OF IRON: CPT | Performed by: PHYSICIAN ASSISTANT

## 2023-06-26 PROCEDURE — 84481 FREE ASSAY (FT-3): CPT | Performed by: PHYSICIAN ASSISTANT

## 2023-06-26 PROCEDURE — 82607 VITAMIN B-12: CPT | Performed by: PHYSICIAN ASSISTANT

## 2023-06-26 PROCEDURE — 36415 COLL VENOUS BLD VENIPUNCTURE: CPT | Performed by: PHYSICIAN ASSISTANT

## 2023-06-26 PROCEDURE — 82784 ASSAY IGA/IGD/IGG/IGM EACH: CPT

## 2023-06-26 PROCEDURE — 80061 LIPID PANEL: CPT

## 2023-06-26 PROCEDURE — 83036 HEMOGLOBIN GLYCOSYLATED A1C: CPT | Performed by: PHYSICIAN ASSISTANT

## 2023-06-26 PROCEDURE — 85025 COMPLETE CBC W/AUTO DIFF WBC: CPT | Performed by: PHYSICIAN ASSISTANT

## 2023-06-26 PROCEDURE — 80053 COMPREHEN METABOLIC PANEL: CPT | Performed by: PHYSICIAN ASSISTANT

## 2023-06-26 PROCEDURE — 82306 VITAMIN D 25 HYDROXY: CPT

## 2023-06-26 PROCEDURE — 84466 ASSAY OF TRANSFERRIN: CPT | Performed by: PHYSICIAN ASSISTANT

## 2023-06-26 PROCEDURE — 84439 ASSAY OF FREE THYROXINE: CPT | Performed by: PHYSICIAN ASSISTANT

## 2023-06-26 PROCEDURE — 82728 ASSAY OF FERRITIN: CPT | Performed by: PHYSICIAN ASSISTANT

## 2023-06-26 PROCEDURE — 86364 TISS TRNSGLTMNASE EA IG CLAS: CPT

## 2023-06-26 PROCEDURE — 84443 ASSAY THYROID STIM HORMONE: CPT | Performed by: PHYSICIAN ASSISTANT

## 2023-06-26 PROCEDURE — 82746 ASSAY OF FOLIC ACID SERUM: CPT | Performed by: PHYSICIAN ASSISTANT

## 2023-06-27 LAB — TTG IGA SER-ACNC: <0.2 U/ML (ref ?–7)

## 2023-06-29 LAB
CANDIDA IGA AB: NEGATIVE
CANDIDA IGG AB: NEGATIVE
CANDIDA IGM AB IGM: NEGATIVE

## 2023-07-03 ENCOUNTER — TELEPHONE (OUTPATIENT)
Dept: INTEGRATIVE MEDICINE | Facility: CLINIC | Age: 68
End: 2023-07-03

## 2023-07-03 NOTE — TELEPHONE ENCOUNTER
Pt requested to speak with PA. I explained to her PA does not accept calls - communication only by appts. Pt concerned regarding lab results.

## 2023-07-05 NOTE — TELEPHONE ENCOUNTER
RN s/w patient. Patient says that she would like to speak with a provider regarding her low white blood cell count. RN scheduled patient to see Dr. Jac Mclean tomorrow for assessment and evaluation.

## 2023-07-06 ENCOUNTER — OFFICE VISIT (OUTPATIENT)
Dept: INTEGRATIVE MEDICINE | Facility: CLINIC | Age: 68
End: 2023-07-06
Payer: MEDICARE

## 2023-07-06 VITALS
HEIGHT: 62 IN | HEART RATE: 65 BPM | WEIGHT: 118.38 LBS | DIASTOLIC BLOOD PRESSURE: 60 MMHG | SYSTOLIC BLOOD PRESSURE: 104 MMHG | BODY MASS INDEX: 21.79 KG/M2 | OXYGEN SATURATION: 98 % | TEMPERATURE: 97 F | RESPIRATION RATE: 16 BRPM

## 2023-07-06 DIAGNOSIS — R74.8 ABNORMAL ALKALINE PHOSPHATASE TEST: Primary | ICD-10-CM

## 2023-07-06 DIAGNOSIS — D72.819 LEUKOPENIA, UNSPECIFIED TYPE: ICD-10-CM

## 2023-07-06 PROCEDURE — 99214 OFFICE O/P EST MOD 30 MIN: CPT | Performed by: FAMILY MEDICINE

## 2023-07-07 ENCOUNTER — MED REC SCAN ONLY (OUTPATIENT)
Dept: INTEGRATIVE MEDICINE | Facility: CLINIC | Age: 68
End: 2023-07-07

## 2023-07-24 ENCOUNTER — OFFICE VISIT (OUTPATIENT)
Dept: HEMATOLOGY/ONCOLOGY | Facility: HOSPITAL | Age: 68
End: 2023-07-24
Attending: STUDENT IN AN ORGANIZED HEALTH CARE EDUCATION/TRAINING PROGRAM
Payer: MEDICARE

## 2023-07-24 ENCOUNTER — LAB ENCOUNTER (OUTPATIENT)
Dept: LAB | Facility: HOSPITAL | Age: 68
End: 2023-07-24
Attending: STUDENT IN AN ORGANIZED HEALTH CARE EDUCATION/TRAINING PROGRAM
Payer: MEDICARE

## 2023-07-24 VITALS
RESPIRATION RATE: 16 BRPM | DIASTOLIC BLOOD PRESSURE: 50 MMHG | OXYGEN SATURATION: 100 % | TEMPERATURE: 98 F | HEART RATE: 66 BPM | BODY MASS INDEX: 22 KG/M2 | WEIGHT: 120 LBS | SYSTOLIC BLOOD PRESSURE: 119 MMHG

## 2023-07-24 DIAGNOSIS — D72.810 LYMPHOPENIA: Primary | ICD-10-CM

## 2023-07-24 DIAGNOSIS — R74.8 ABNORMAL ALKALINE PHOSPHATASE TEST: ICD-10-CM

## 2023-07-24 DIAGNOSIS — D72.810 LYMPHOPENIA: ICD-10-CM

## 2023-07-24 LAB
BASOPHILS # BLD AUTO: 0.06 X10(3) UL (ref 0–0.2)
BASOPHILS NFR BLD AUTO: 1.1 %
DEPRECATED RDW RBC AUTO: 42.9 FL (ref 35.1–46.3)
EOSINOPHIL # BLD AUTO: 0.05 X10(3) UL (ref 0–0.7)
EOSINOPHIL NFR BLD AUTO: 0.9 %
ERYTHROCYTE [DISTWIDTH] IN BLOOD BY AUTOMATED COUNT: 12.4 % (ref 11–15)
HAV AB SER QL IA: NONREACTIVE
HBV CORE AB SERPL QL IA: NONREACTIVE
HBV SURFACE AB SER QL: REACTIVE
HBV SURFACE AB SERPL IA-ACNC: 14.75 MIU/ML
HBV SURFACE AG SERPL QL IA: NONREACTIVE
HCT VFR BLD AUTO: 38 %
HCV AB SERPL QL IA: NONREACTIVE
HGB BLD-MCNC: 12.4 G/DL
IMM GRANULOCYTES # BLD AUTO: 0.01 X10(3) UL (ref 0–1)
IMM GRANULOCYTES NFR BLD: 0.2 %
LYMPHOCYTES # BLD AUTO: 1.25 X10(3) UL (ref 1–4)
LYMPHOCYTES NFR BLD AUTO: 22.6 %
MCH RBC QN AUTO: 30.8 PG (ref 26–34)
MCHC RBC AUTO-ENTMCNC: 32.6 G/DL (ref 31–37)
MCV RBC AUTO: 94.5 FL
MONOCYTES # BLD AUTO: 0.44 X10(3) UL (ref 0.1–1)
MONOCYTES NFR BLD AUTO: 7.9 %
NEUTROPHILS # BLD AUTO: 3.73 X10 (3) UL (ref 1.5–7.7)
NEUTROPHILS # BLD AUTO: 3.73 X10(3) UL (ref 1.5–7.7)
NEUTROPHILS NFR BLD AUTO: 67.3 %
PLATELET # BLD AUTO: 237 10(3)UL (ref 150–450)
RBC # BLD AUTO: 4.02 X10(6)UL
VIT B12 SERPL-MCNC: 282 PG/ML (ref 193–986)
WBC # BLD AUTO: 5.5 X10(3) UL (ref 4–11)

## 2023-07-24 PROCEDURE — 83921 ORGANIC ACID SINGLE QUANT: CPT

## 2023-07-24 PROCEDURE — 86704 HEP B CORE ANTIBODY TOTAL: CPT

## 2023-07-24 PROCEDURE — 86708 HEPATITIS A ANTIBODY: CPT

## 2023-07-24 PROCEDURE — 82607 VITAMIN B-12: CPT

## 2023-07-24 PROCEDURE — 85060 BLOOD SMEAR INTERPRETATION: CPT

## 2023-07-24 PROCEDURE — 80503 PATH CLIN CONSLTJ SF 5-20: CPT

## 2023-07-24 PROCEDURE — 85025 COMPLETE CBC W/AUTO DIFF WBC: CPT

## 2023-07-24 PROCEDURE — 86706 HEP B SURFACE ANTIBODY: CPT

## 2023-07-24 PROCEDURE — 36415 COLL VENOUS BLD VENIPUNCTURE: CPT

## 2023-07-24 PROCEDURE — 87340 HEPATITIS B SURFACE AG IA: CPT

## 2023-07-24 PROCEDURE — 84630 ASSAY OF ZINC: CPT

## 2023-07-24 PROCEDURE — 86803 HEPATITIS C AB TEST: CPT

## 2023-07-24 PROCEDURE — 99204 OFFICE O/P NEW MOD 45 MIN: CPT | Performed by: STUDENT IN AN ORGANIZED HEALTH CARE EDUCATION/TRAINING PROGRAM

## 2023-07-24 PROCEDURE — 87389 HIV-1 AG W/HIV-1&-2 AB AG IA: CPT

## 2023-07-24 RX ORDER — ZINC SULFATE 66 MG
TABLET ORAL
COMMUNITY

## 2023-07-27 LAB — ZINC, RBC: 1143 UG/DL

## 2023-07-28 ENCOUNTER — TELEPHONE (OUTPATIENT)
Dept: HEMATOLOGY/ONCOLOGY | Facility: HOSPITAL | Age: 68
End: 2023-07-28

## 2023-07-28 DIAGNOSIS — E61.1 IRON DEFICIENCY: ICD-10-CM

## 2023-07-28 DIAGNOSIS — R79.0 LOW FERRITIN: Primary | ICD-10-CM

## 2023-07-28 LAB — METHYLMALONIC ACID: 169 NMOL/L

## 2023-07-28 NOTE — TELEPHONE ENCOUNTER
Patient calling forgot to mention she is taking biotin. Got blood results back and one of the test says if taking biotin it can skew results.

## 2023-07-28 NOTE — TELEPHONE ENCOUNTER
Discussed 7/24/23 labs with patient. Given iron deficiency, follow-up with GI. Continue iron supplement and repeat labs in 3 months - CBC, TIBC, total Fe and ferritin. MD follow-up scheduled for 10/27/23. Hep A, C and HIV negative. Hep B immunity noted.     MMA normal so not vitamin B12 deficient

## 2023-09-07 ENCOUNTER — HOSPITAL ENCOUNTER (OUTPATIENT)
Dept: GENERAL RADIOLOGY | Age: 68
Discharge: HOME | End: 2023-09-07
Attending: FAMILY MEDICINE
Payer: MEDICARE

## 2023-09-07 ENCOUNTER — OFFICE VISIT (OUTPATIENT)
Dept: INTEGRATIVE MEDICINE | Facility: CLINIC | Age: 68
End: 2023-09-07
Payer: MEDICARE

## 2023-09-07 VITALS
DIASTOLIC BLOOD PRESSURE: 78 MMHG | OXYGEN SATURATION: 97 % | WEIGHT: 119.19 LBS | SYSTOLIC BLOOD PRESSURE: 102 MMHG | BODY MASS INDEX: 22 KG/M2 | HEART RATE: 68 BPM

## 2023-09-07 DIAGNOSIS — M54.2 NECK PAIN: Primary | ICD-10-CM

## 2023-09-07 DIAGNOSIS — R74.8 ABNORMAL ALKALINE PHOSPHATASE TEST: ICD-10-CM

## 2023-09-07 DIAGNOSIS — R79.0 LOW FERRITIN: ICD-10-CM

## 2023-09-07 DIAGNOSIS — E55.9 VITAMIN D DEFICIENCY: ICD-10-CM

## 2023-09-07 DIAGNOSIS — M54.2 NECK PAIN: ICD-10-CM

## 2023-09-07 PROBLEM — M79.18 MYOFASCIAL PAIN: Status: ACTIVE | Noted: 2023-04-14

## 2023-09-07 PROBLEM — S73.199A LABRAL TEAR OF HIP JOINT: Status: ACTIVE | Noted: 2023-01-06

## 2023-09-07 PROBLEM — M81.0 AGE-RELATED OSTEOPOROSIS WITHOUT CURRENT PATHOLOGICAL FRACTURE: Status: ACTIVE | Noted: 2023-01-06

## 2023-09-07 PROBLEM — N39.41 URGE INCONTINENCE: Status: ACTIVE | Noted: 2023-01-06

## 2023-09-07 PROBLEM — N32.81 URGENCY-FREQUENCY SYNDROME: Status: ACTIVE | Noted: 2023-04-14

## 2023-09-07 PROCEDURE — 99214 OFFICE O/P EST MOD 30 MIN: CPT | Performed by: FAMILY MEDICINE

## 2023-09-07 PROCEDURE — 72040 X-RAY EXAM NECK SPINE 2-3 VW: CPT | Performed by: FAMILY MEDICINE

## 2023-09-07 RX ORDER — CYCLOBENZAPRINE HCL 5 MG
5 TABLET ORAL 3 TIMES DAILY PRN
Qty: 21 TABLET | Refills: 0 | Status: SHIPPED | OUTPATIENT
Start: 2023-09-07

## 2023-09-07 RX ORDER — CHOLECALCIFEROL (VITAMIN D3) 2400/ML
4000 LIQUID (ML) MISCELLANEOUS AS DIRECTED
COMMUNITY

## 2023-09-11 ENCOUNTER — LAB ENCOUNTER (OUTPATIENT)
Dept: LAB | Facility: HOSPITAL | Age: 68
End: 2023-09-11
Payer: MEDICARE

## 2023-09-11 DIAGNOSIS — R19.7 DIARRHEA: ICD-10-CM

## 2023-09-11 PROCEDURE — 82705 FATS/LIPIDS FECES QUAL: CPT

## 2023-09-11 PROCEDURE — 83993 ASSAY FOR CALPROTECTIN FECAL: CPT

## 2023-09-11 PROCEDURE — 87209 SMEAR COMPLEX STAIN: CPT

## 2023-09-11 PROCEDURE — 87493 C DIFF AMPLIFIED PROBE: CPT

## 2023-09-11 PROCEDURE — 87177 OVA AND PARASITES SMEARS: CPT

## 2023-09-12 LAB — C DIFF TOX B STL QL: NEGATIVE

## 2023-09-14 LAB — CALPROTECTIN STL-MCNT: <5 ΜG/G (ref ?–50)

## 2023-09-18 ENCOUNTER — MED REC SCAN ONLY (OUTPATIENT)
Dept: INTEGRATIVE MEDICINE | Facility: CLINIC | Age: 68
End: 2023-09-18

## 2023-09-18 LAB
FATS NEUTRAL: NORMAL
FATS TOTAL: NORMAL

## 2023-10-04 ENCOUNTER — HOSPITAL ENCOUNTER (EMERGENCY)
Facility: HOSPITAL | Age: 68
Discharge: HOME | End: 2023-10-04
Attending: EMERGENCY MEDICINE

## 2023-10-04 ENCOUNTER — APPOINTMENT (OUTPATIENT)
Dept: GENERAL RADIOLOGY | Facility: HOSPITAL | Age: 68
End: 2023-10-04

## 2023-10-04 VITALS
RESPIRATION RATE: 15 BRPM | WEIGHT: 119 LBS | BODY MASS INDEX: 21.9 KG/M2 | SYSTOLIC BLOOD PRESSURE: 117 MMHG | HEART RATE: 70 BPM | OXYGEN SATURATION: 100 % | TEMPERATURE: 98 F | HEIGHT: 62 IN | DIASTOLIC BLOOD PRESSURE: 81 MMHG

## 2023-10-04 DIAGNOSIS — S99.921A RIGHT FOOT INJURY, INITIAL ENCOUNTER: Primary | ICD-10-CM

## 2023-10-04 PROCEDURE — 73630 X-RAY EXAM OF FOOT: CPT

## 2023-10-04 PROCEDURE — 99284 EMERGENCY DEPT VISIT MOD MDM: CPT

## 2023-10-04 PROCEDURE — 99283 EMERGENCY DEPT VISIT LOW MDM: CPT

## 2023-10-04 RX ORDER — MELOXICAM 7.5 MG/1
7.5 TABLET ORAL DAILY PRN
Qty: 7 TABLET | Refills: 0 | Status: SHIPPED | OUTPATIENT
Start: 2023-10-04 | End: 2023-10-11

## 2023-10-04 NOTE — ED INITIAL ASSESSMENT (HPI)
Pain to right foot \"after stepping wrong\" last night. +limping in triage. No deformity. Cms intact.

## 2023-10-10 ENCOUNTER — PATIENT OUTREACH (OUTPATIENT)
Dept: CASE MANAGEMENT | Age: 68
End: 2023-10-10

## 2023-10-10 NOTE — PROGRESS NOTES
1st attempt ER f/up apt request  No answer, LVMTCB to schedule apts  PCP -existing apt (12/7), unable to contact  POD -unable to contact  Closing encounter

## 2023-10-23 ENCOUNTER — TELEPHONE (OUTPATIENT)
Dept: INTEGRATIVE MEDICINE | Facility: CLINIC | Age: 68
End: 2023-10-23

## 2023-10-23 DIAGNOSIS — M79.671 RIGHT FOOT PAIN: Primary | ICD-10-CM

## 2023-10-24 NOTE — TELEPHONE ENCOUNTER
Patient seen in ED for foot pain, had XRAY completed. Patient is requesting PT order for foot- needs appt to discuss prior to referral?       Thanks    Narrative   PROCEDURE: XR FOOT, COMPLETE (MIN 3 VIEWS), RIGHT (CPT=73630)     COMPARISON: [facility], XR FOOT, COMPLETE (MIN 3 VIEWS), RIGHT (CPT=73630), 8/08/2021, 3:59 PM.     INDICATIONS: Twisted right ankle x 1 day. Pain in lateral and plantar surface of right foot     TECHNIQUE: 3 views were obtained. FINDINGS:  BONES: Minimal hallux valgus deformity of the right great toe with mild narrowing of the right 1st metatarsophalangeal joint. No acute appearing fracture or dislocation. SOFT TISSUES: Negative. No visible soft tissue swelling. EFFUSION: None visible. OTHER: Negative. Impression   CONCLUSION:  1. No acute appearing fracture or dislocation. Mild hallux valgus deformity of the right great toe with mild narrowing of the right 1st metatarsophalangeal joint.

## 2023-10-26 ENCOUNTER — LAB ENCOUNTER (OUTPATIENT)
Dept: LAB | Facility: HOSPITAL | Age: 68
End: 2023-10-26
Attending: PHYSICIAN ASSISTANT

## 2023-10-26 ENCOUNTER — TELEPHONE (OUTPATIENT)
Dept: HEMATOLOGY/ONCOLOGY | Facility: HOSPITAL | Age: 68
End: 2023-10-26

## 2023-10-26 DIAGNOSIS — E61.1 IRON DEFICIENCY: ICD-10-CM

## 2023-10-26 DIAGNOSIS — R79.0 LOW FERRITIN: ICD-10-CM

## 2023-10-26 LAB
BASOPHILS # BLD AUTO: 0.06 X10(3) UL (ref 0–0.2)
BASOPHILS NFR BLD AUTO: 1.6 %
DEPRECATED HBV CORE AB SER IA-ACNC: 20.5 NG/ML
DEPRECATED RDW RBC AUTO: 42.3 FL (ref 35.1–46.3)
EOSINOPHIL # BLD AUTO: 0.09 X10(3) UL (ref 0–0.7)
EOSINOPHIL NFR BLD AUTO: 2.4 %
ERYTHROCYTE [DISTWIDTH] IN BLOOD BY AUTOMATED COUNT: 12.2 % (ref 11–15)
HCT VFR BLD AUTO: 36.9 %
HGB BLD-MCNC: 12.3 G/DL
IMM GRANULOCYTES # BLD AUTO: 0.01 X10(3) UL (ref 0–1)
IMM GRANULOCYTES NFR BLD: 0.3 %
IRON SATN MFR SERPL: 28 %
IRON SERPL-MCNC: 103 UG/DL
LYMPHOCYTES # BLD AUTO: 1.37 X10(3) UL (ref 1–4)
LYMPHOCYTES NFR BLD AUTO: 36.1 %
MCH RBC QN AUTO: 31 PG (ref 26–34)
MCHC RBC AUTO-ENTMCNC: 33.3 G/DL (ref 31–37)
MCV RBC AUTO: 92.9 FL
MONOCYTES # BLD AUTO: 0.51 X10(3) UL (ref 0.1–1)
MONOCYTES NFR BLD AUTO: 13.4 %
NEUTROPHILS # BLD AUTO: 1.76 X10 (3) UL (ref 1.5–7.7)
NEUTROPHILS # BLD AUTO: 1.76 X10(3) UL (ref 1.5–7.7)
NEUTROPHILS NFR BLD AUTO: 46.2 %
PLATELET # BLD AUTO: 239 10(3)UL (ref 150–450)
RBC # BLD AUTO: 3.97 X10(6)UL
TIBC SERPL-MCNC: 362 UG/DL (ref 240–450)
TRANSFERRIN SERPL-MCNC: 243 MG/DL (ref 200–360)
WBC # BLD AUTO: 3.8 X10(3) UL (ref 4–11)

## 2023-10-26 PROCEDURE — 85025 COMPLETE CBC W/AUTO DIFF WBC: CPT

## 2023-10-26 PROCEDURE — 83540 ASSAY OF IRON: CPT

## 2023-10-26 PROCEDURE — 36415 COLL VENOUS BLD VENIPUNCTURE: CPT

## 2023-10-26 PROCEDURE — 84466 ASSAY OF TRANSFERRIN: CPT

## 2023-10-26 PROCEDURE — 82728 ASSAY OF FERRITIN: CPT

## 2023-10-26 NOTE — TELEPHONE ENCOUNTER
Writer called patient as a reminder of appointment tomorrow and completion of labs. Patient stated that she is at the hospital now to have labs drawn.

## 2023-10-27 ENCOUNTER — OFFICE VISIT (OUTPATIENT)
Dept: HEMATOLOGY/ONCOLOGY | Facility: HOSPITAL | Age: 68
End: 2023-10-27
Attending: STUDENT IN AN ORGANIZED HEALTH CARE EDUCATION/TRAINING PROGRAM

## 2023-10-27 VITALS
BODY MASS INDEX: 22.48 KG/M2 | DIASTOLIC BLOOD PRESSURE: 53 MMHG | WEIGHT: 122.19 LBS | TEMPERATURE: 98 F | OXYGEN SATURATION: 100 % | HEART RATE: 64 BPM | HEIGHT: 62 IN | SYSTOLIC BLOOD PRESSURE: 107 MMHG | RESPIRATION RATE: 18 BRPM

## 2023-10-27 DIAGNOSIS — R79.0 LOW FERRITIN: Primary | ICD-10-CM

## 2023-10-27 DIAGNOSIS — D72.810 LYMPHOPENIA: ICD-10-CM

## 2023-10-27 PROCEDURE — 99213 OFFICE O/P EST LOW 20 MIN: CPT | Performed by: STUDENT IN AN ORGANIZED HEALTH CARE EDUCATION/TRAINING PROGRAM

## 2023-10-27 RX ORDER — PANTOPRAZOLE SODIUM 40 MG/1
40 TABLET, DELAYED RELEASE ORAL
COMMUNITY
Start: 2023-09-15

## 2023-11-20 NOTE — ED PROVIDER NOTES
Patient Seen in: [facility]      History   No chief complaint on file.     Stated Complaint: Right foot pain    HPI/Subjective:   HPI    The patient is a 31-year-old female with no significant past medical history presents now with right foot Pharmacy Progress Note - Diabetes Management    S/O: Ms. Bal Ferguson is a 69 y.o. female, referred by Dr. Danny Francois, APRN - NP, with a PMH of HTN, CAD, NAFL, T2DM, gout, OA, CKD, hypercholesterolemia, Vit D deficiency, chronic pain, polymyalgia rheumatica, was seen today for diabetes management. Patient's last A1c was 7.4% (Sept 2023) which is decreased from 10.5% (June 2023) which is increased from 8.2% (March 2023). Interim update: ***      Applied for rx assist - jardiance, spiriva respimat (discussed difference between handihaler)    Bg rdgs 120s at all times of the day  Ppbg 140s    Has 3 boxes of Trulicity    Completed rx assist apfor trulicity electronically    Medication Adherence/Access:  - Receives Trulicity thru Rx assistance  - Receives Jardiance thru Rx assistance    Diabetes Management:  Diabetes History:  - Endorses   - Family hx:   - Previous anti-hyperglycemic agents includes:    Current anti-hyperglycemic regimen includes:    - Metformin 500 mg 2 tabs BID  - Trulicity 3 mg weekly  - Jardiance 25 mg daily    ROS:  Today, Pt endorses:  - {Symptoms of Hyperglycemia:26230:::1}  - {Symptoms of Hypoglycemia:30424:::1}    Self Monitoring Blood Glucose (SMBG) or CGM:  - Brought in home glucometer/blood glucose log/CGM reader today:  {yes no:813182}  - Checks BG: ***   - Fasting SMBG today: ***  - Post-prandial:  - SMBG range:  - CGM range:    Nutrition:  - Eats {Numbers; 1-4 :47079492} meals/day   - Breakfast:  - Lunch:  - Dinner:   - Snack(s):   - Beverage(s):  - Alcohol consumption?  {YES (DEF) - NO:4323023511::\"Yes\"}    Physical Activity:   {yes no:985974}  - Consists of ***    Diabetes Health Maintenance:  ASCVD Risk Factors: {ASCVDRisk:91766}  ASA therapy:  ***   ACE/ARB therapy: ***  Optimized statin therapy: ***  The 10-year ASCVD risk score (Teagan MANZO, et al., 2019) is: 35.5%    LDL: ***  Nicotine dependence: {YES (DEF) - NO:9041662042::\"Yes\"}  Last eye exam: ***  Last foot sclera, no conjunctival injection  Vascular: Palpable right posterior tibial pulse with good capillary refill and all toes  Neurologic: Patient is awake, alert and oriented ×3.   The patient's motor strength is 5 out of 5 and symmetric in the upper and lowe [place] Benson, RICK - NP for review. Patient will return to clinic in 12 week(s) for follow up.      May Graves, PharmD, [facility]  Clinical Pharmacist Specialist          For Pharmacy Admin Tracking Only    Program: Medical Group  CPA in place:  Yes  Recommendation Provided To: Patient/Caregiver: 5 via In person  Intervention Detail: Discontinued Rx: 1, reason: Cost/Formulary Change, New Rx: 1, reason: Cost/Formulary Change, and Patient Access Assistance/Sample Provided  Intervention Accepted By: Patient/Caregiver: 5  Time Spent (min): 60

## 2023-12-18 ENCOUNTER — MED REC SCAN ONLY (OUTPATIENT)
Dept: INTEGRATIVE MEDICINE | Facility: CLINIC | Age: 68
End: 2023-12-18

## 2024-03-27 ENCOUNTER — TELEPHONE (OUTPATIENT)
Age: 69
End: 2024-03-27

## 2024-04-03 ENCOUNTER — TELEPHONE (OUTPATIENT)
Age: 69
End: 2024-04-03

## 2024-04-20 NOTE — TELEPHONE ENCOUNTER
Pt refusing US at this time.     RN s/w patient. Patient questions answered. Patient is requesting lipid panel be added to lab orders. Order pended, please review. Thanks!

## 2024-04-26 ENCOUNTER — TELEPHONE (OUTPATIENT)
Dept: HEMATOLOGY/ONCOLOGY | Facility: HOSPITAL | Age: 69
End: 2024-04-26

## 2024-04-26 NOTE — TELEPHONE ENCOUNTER
Patient has 6m f/u scheduled for ALVIN. However, lab work needed prior has not been completed. This RN called patient and left voicemail to have them completed over the weekend or re-schedule appointment.    Waiting for call back from patient.

## 2024-04-27 ENCOUNTER — LAB ENCOUNTER (OUTPATIENT)
Dept: LAB | Facility: HOSPITAL | Age: 69
End: 2024-04-27
Attending: STUDENT IN AN ORGANIZED HEALTH CARE EDUCATION/TRAINING PROGRAM
Payer: MEDICARE

## 2024-04-27 DIAGNOSIS — R79.0 LOW FERRITIN: ICD-10-CM

## 2024-04-27 LAB
BASOPHILS # BLD AUTO: 0.06 X10(3) UL (ref 0–0.2)
BASOPHILS NFR BLD AUTO: 1.4 %
DEPRECATED HBV CORE AB SER IA-ACNC: 22.9 NG/ML
DEPRECATED RDW RBC AUTO: 42.9 FL (ref 35.1–46.3)
EOSINOPHIL # BLD AUTO: 0.05 X10(3) UL (ref 0–0.7)
EOSINOPHIL NFR BLD AUTO: 1.1 %
ERYTHROCYTE [DISTWIDTH] IN BLOOD BY AUTOMATED COUNT: 12.3 % (ref 11–15)
HCT VFR BLD AUTO: 37.8 %
HGB BLD-MCNC: 12.6 G/DL
IMM GRANULOCYTES # BLD AUTO: 0.01 X10(3) UL (ref 0–1)
IMM GRANULOCYTES NFR BLD: 0.2 %
IRON SATN MFR SERPL: 16 %
IRON SERPL-MCNC: 59 UG/DL
LYMPHOCYTES # BLD AUTO: 1.63 X10(3) UL (ref 1–4)
LYMPHOCYTES NFR BLD AUTO: 37.4 %
MCH RBC QN AUTO: 31.3 PG (ref 26–34)
MCHC RBC AUTO-ENTMCNC: 33.3 G/DL (ref 31–37)
MCV RBC AUTO: 94 FL
MONOCYTES # BLD AUTO: 0.44 X10(3) UL (ref 0.1–1)
MONOCYTES NFR BLD AUTO: 10.1 %
NEUTROPHILS # BLD AUTO: 2.17 X10 (3) UL (ref 1.5–7.7)
NEUTROPHILS # BLD AUTO: 2.17 X10(3) UL (ref 1.5–7.7)
NEUTROPHILS NFR BLD AUTO: 49.8 %
PLATELET # BLD AUTO: 246 10(3)UL (ref 150–450)
RBC # BLD AUTO: 4.02 X10(6)UL
TIBC SERPL-MCNC: 380 UG/DL (ref 250–425)
TRANSFERRIN SERPL-MCNC: 255 MG/DL (ref 250–380)
WBC # BLD AUTO: 4.4 X10(3) UL (ref 4–11)

## 2024-04-27 PROCEDURE — 84466 ASSAY OF TRANSFERRIN: CPT

## 2024-04-27 PROCEDURE — 36415 COLL VENOUS BLD VENIPUNCTURE: CPT

## 2024-04-27 PROCEDURE — 85025 COMPLETE CBC W/AUTO DIFF WBC: CPT

## 2024-04-27 PROCEDURE — 83540 ASSAY OF IRON: CPT

## 2024-04-27 PROCEDURE — 82728 ASSAY OF FERRITIN: CPT

## 2024-04-29 ENCOUNTER — OFFICE VISIT (OUTPATIENT)
Dept: HEMATOLOGY/ONCOLOGY | Facility: HOSPITAL | Age: 69
End: 2024-04-29
Attending: STUDENT IN AN ORGANIZED HEALTH CARE EDUCATION/TRAINING PROGRAM
Payer: MEDICARE

## 2024-04-29 VITALS
HEART RATE: 71 BPM | OXYGEN SATURATION: 97 % | BODY MASS INDEX: 21.93 KG/M2 | SYSTOLIC BLOOD PRESSURE: 122 MMHG | TEMPERATURE: 98 F | RESPIRATION RATE: 16 BRPM | WEIGHT: 116.13 LBS | HEIGHT: 61 IN | DIASTOLIC BLOOD PRESSURE: 50 MMHG

## 2024-04-29 DIAGNOSIS — R79.0 LOW FERRITIN: Primary | ICD-10-CM

## 2024-04-29 DIAGNOSIS — D72.819 LEUKOPENIA, UNSPECIFIED TYPE: ICD-10-CM

## 2024-04-29 PROCEDURE — 99213 OFFICE O/P EST LOW 20 MIN: CPT | Performed by: PHYSICIAN ASSISTANT

## 2024-04-29 NOTE — PROGRESS NOTES
[facility]  Hematology Clinic Progress Note    Patient Name: Matilde Connor   YOB: 1955   Medical Record Number: [id number]    Reason for consultation: Lymphopenia    Subjective:   Matilde Connor is a 69 year old female with no significant medical comorbidities who presents for hematology followup regarding isolated lymphopenia and mild iron deficiency.  On June 26, the patient was found have a white blood cell count of 3.0, lymphocyte count of 0.69.  Upon chart review, patient's white blood cell count has fluctuated between 3.0-4.6 dating back to 2018, differential has been within normal limits except for more recently with a lymphocyte count of 0.69.  Iron indices also showed a ferritin of 44.6, iron saturation of 9%, found to have a B12 level of 323 and folate of 17.4.  She was referred to hematology and initially seen in July 2023.  She was found to have mild iron deficiency with a saturation of 9% and ferritin of 44, hemoglobin was 13, MCV 94.  Lymphopenia work-up was unremarkable.  She presents for a six month follow-up.    She states she take oral iron and vitamin c a few times per week.  The iron causes constipation, which myranda her.  Endorses fatigue.  Insomnia at times.  Drinks fluids when thirsty.      Denies anorexia, cough, dyspnea, abdominal pain, dyspepsia, nausea, diarrhea, peripheral edema, rash and abnormal bleeding/bruising including melena.    Her EGD was completed through Dr. Kolton Anglin in 6/2023 - erosions in stomach, erythema in duodenal bulb, both negative for malignancy; lymphocytic esophagitis noted on biopsy.  Up to date on her CLN (11/2019, Stathopoulos), mammogram (2/9/24, birads 2) and DEXA (7/2022, due in 7/2024, osteoporosis, alendronate, ca2+/vit d) scans.  She states she see her GI, Dr. Hi, every 3 months to monitor her lymphocytic esophagitis.      Review of Systems:  Hematology/Oncology ROS performed and negative except as  above in HPI    History/Other:   Current Medications:   alendronate 70 MG Oral Tab tablet      Calcium Carbonate 1250 (500 Ca) MG Oral Chew Tab 90 tablets (112,500 mg total).      pantoprazole 40 MG Oral Tab EC Take 1 tablet (40 mg total) by mouth.      Calcium 200 MG Oral Tab As Directed.      Cholecalciferol (VITAMIN D3) 2400 UNIT/ML Does not apply Liquid Take 4,000 mL by mouth As Directed.      NON FORMULARY phytostan      Turmeric (QC TUMERIC COMPLEX OR) Take by mouth.      OMEGA 3-6-9 FATTY ACIDS OR Take by mouth.      Zinc Sulfate (ZINC 15) 66 MG Oral Tab Take by mouth.      Ascorbic Acid (VITAMIN C) 500 MG Oral Cap Take 1 Bottle by mouth As Directed.      Ferrous Sulfate (IRON) 325 (65 Fe) MG Oral Tab       Estradiol 0.1 MG/GM Vaginal Cream Place 2 g vaginally.       Allergies:   Allergies   Allergen Reactions    Eggs Or Egg-Derived Products OTHER (SEE COMMENTS)     Per gastro per patient    Miconazole ITCHING     Burning and itching        Objective:   Blood pressure 122/50, pulse 71, temperature 98 °F (36.7 °C), temperature source Oral, resp. rate 16, height 1.549 m (5' 1\"), weight 52.7 kg (116 lb 1.6 oz), SpO2 97%.  Physical Exam:  General: A&Ox3, NAD, PS0  HEENT: PERRL, EOMsi, MMM  Neck:  Supple, nt, no LAD  CV: RRR  Pulm: normal effort, CTA  Abd:  soft ntnd, bs+  Extremities: no edema  Neurological: grossly intact    Results:   Labs:  Lab Results   Component Value Date    WBC 4.4 04/27/2024    HGB 12.6 04/27/2024    HCT 37.8 04/27/2024    .0 04/27/2024    CREATSERUM 0.53 (L) 06/26/2023    BUN 10 06/26/2023     06/26/2023    K 4.0 06/26/2023     06/26/2023    CO2 29.0 06/26/2023    GLU 96 06/26/2023    CA 9.0 06/26/2023    ALB 4.0 06/26/2023    ALKPHO 40 (L) 06/26/2023    BILT 0.7 06/26/2023    TP 7.4 06/26/2023    AST 21 06/26/2023    ALT 23 06/26/2023    T4F 1.0 06/26/2023    TSH 0.951 06/26/2023    MG 2.3 11/02/2021    B12 282 07/24/2023     Assessment & Plan:   Matilde Ernst  Nikolas is a 69 year old female with no significant medical comorbidities who presents to hematology regarding isolated lymphopenia and mild iron deficiency.      H/o lymphopenia, WBC stable. Likely the patient's baseline.   2.     Iron deficiency anemia, possibly taking oral iron and vitamin C less frequent this past 6 months versus prior to last labs in October.  Iron stores replete.  Given a downward trend, recommend continuing a few times per week.  Repeat labs in 6 months.  Ok to return to PCP.  Call prn    MDM: jazmin Handley PA-C    [facility]  [facility]    This note was created using a voice-recognition transcribing system. Incorrect words or phrases may have been missed during proofreading. Please interpret accordingly.

## 2024-04-29 NOTE — PATIENT INSTRUCTIONS
Repeat labs in 6 months  Call as needed  Recommend iron supplement 3 days per week with Vitamin C

## 2024-05-16 PROBLEM — K20.80 LYMPHOCYTIC ESOPHAGITIS: Status: ACTIVE | Noted: 2024-05-16

## 2024-05-16 PROBLEM — S76.319A PARTIAL HAMSTRING TEAR: Status: ACTIVE | Noted: 2024-05-16

## 2024-05-16 PROBLEM — R19.7 DIARRHEA, UNSPECIFIED: Status: ACTIVE | Noted: 2024-05-16

## 2024-05-17 ENCOUNTER — LAB ENCOUNTER (OUTPATIENT)
Dept: LAB | Facility: REFERENCE LAB | Age: 69
End: 2024-05-17
Attending: STUDENT IN AN ORGANIZED HEALTH CARE EDUCATION/TRAINING PROGRAM

## 2024-05-17 ENCOUNTER — OFFICE VISIT (OUTPATIENT)
Dept: FAMILY MEDICINE CLINIC | Facility: CLINIC | Age: 69
End: 2024-05-17

## 2024-05-17 VITALS
DIASTOLIC BLOOD PRESSURE: 72 MMHG | TEMPERATURE: 98 F | HEIGHT: 62 IN | BODY MASS INDEX: 21.71 KG/M2 | HEART RATE: 63 BPM | OXYGEN SATURATION: 99 % | SYSTOLIC BLOOD PRESSURE: 115 MMHG | WEIGHT: 118 LBS

## 2024-05-17 DIAGNOSIS — M81.0 AGE-RELATED OSTEOPOROSIS WITHOUT CURRENT PATHOLOGICAL FRACTURE: ICD-10-CM

## 2024-05-17 DIAGNOSIS — Z00.00 ENCOUNTER FOR ANNUAL HEALTH EXAMINATION: ICD-10-CM

## 2024-05-17 DIAGNOSIS — R39.15 URINARY URGENCY: ICD-10-CM

## 2024-05-17 DIAGNOSIS — Z23 ENCOUNTER FOR IMMUNIZATION: ICD-10-CM

## 2024-05-17 DIAGNOSIS — Z71.85 IMMUNIZATION COUNSELING: ICD-10-CM

## 2024-05-17 DIAGNOSIS — Z78.0 POSTMENOPAUSAL: ICD-10-CM

## 2024-05-17 DIAGNOSIS — N95.2 VAGINAL ATROPHY: ICD-10-CM

## 2024-05-17 DIAGNOSIS — H25.10 NUCLEAR SENILE CATARACT, UNSPECIFIED LATERALITY: ICD-10-CM

## 2024-05-17 DIAGNOSIS — H91.90 HEARING LOSS, UNSPECIFIED HEARING LOSS TYPE, UNSPECIFIED LATERALITY: ICD-10-CM

## 2024-05-17 DIAGNOSIS — Z00.00 ENCOUNTER FOR ANNUAL HEALTH EXAMINATION: Primary | ICD-10-CM

## 2024-05-17 DIAGNOSIS — D72.819 LEUKOPENIA, UNSPECIFIED TYPE: ICD-10-CM

## 2024-05-17 LAB
ALBUMIN SERPL-MCNC: 4.5 G/DL (ref 3.2–4.8)
ALBUMIN/GLOB SERPL: 1.8 {RATIO} (ref 1–2)
ALP LIVER SERPL-CCNC: 31 U/L
ALT SERPL-CCNC: 16 U/L
ANION GAP SERPL CALC-SCNC: 6 MMOL/L (ref 0–18)
AST SERPL-CCNC: 20 U/L (ref ?–34)
BILIRUB SERPL-MCNC: 1.1 MG/DL (ref 0.2–1.1)
BUN BLD-MCNC: 17 MG/DL (ref 9–23)
BUN/CREAT SERPL: 25.8 (ref 10–20)
CALCIUM BLD-MCNC: 9.1 MG/DL (ref 8.7–10.4)
CHLORIDE SERPL-SCNC: 106 MMOL/L (ref 98–112)
CHOLEST SERPL-MCNC: 217 MG/DL (ref ?–200)
CO2 SERPL-SCNC: 30 MMOL/L (ref 21–32)
CREAT BLD-MCNC: 0.66 MG/DL
EGFRCR SERPLBLD CKD-EPI 2021: 96 ML/MIN/1.73M2 (ref 60–?)
EST. AVERAGE GLUCOSE BLD GHB EST-MCNC: 108 MG/DL (ref 68–126)
FASTING PATIENT LIPID ANSWER: YES
FASTING STATUS PATIENT QL REPORTED: YES
GLOBULIN PLAS-MCNC: 2.5 G/DL (ref 2–3.5)
GLUCOSE BLD-MCNC: 89 MG/DL (ref 70–99)
HBA1C MFR BLD: 5.4 % (ref ?–5.7)
HDLC SERPL-MCNC: 89 MG/DL (ref 40–59)
LDLC SERPL CALC-MCNC: 120 MG/DL (ref ?–100)
NONHDLC SERPL-MCNC: 128 MG/DL (ref ?–130)
OSMOLALITY SERPL CALC.SUM OF ELEC: 295 MOSM/KG (ref 275–295)
POTASSIUM SERPL-SCNC: 3.8 MMOL/L (ref 3.5–5.1)
PROT SERPL-MCNC: 7 G/DL (ref 5.7–8.2)
SODIUM SERPL-SCNC: 142 MMOL/L (ref 136–145)
TRIGL SERPL-MCNC: 47 MG/DL (ref 30–149)
TSI SER-ACNC: 1.45 MIU/ML (ref 0.55–4.78)
VIT D+METAB SERPL-MCNC: 39 NG/ML (ref 30–100)
VLDLC SERPL CALC-MCNC: 8 MG/DL (ref 0–30)

## 2024-05-17 PROCEDURE — 83036 HEMOGLOBIN GLYCOSYLATED A1C: CPT

## 2024-05-17 PROCEDURE — 80061 LIPID PANEL: CPT

## 2024-05-17 PROCEDURE — 80053 COMPREHEN METABOLIC PANEL: CPT

## 2024-05-17 PROCEDURE — 84443 ASSAY THYROID STIM HORMONE: CPT

## 2024-05-17 PROCEDURE — 36415 COLL VENOUS BLD VENIPUNCTURE: CPT

## 2024-05-17 PROCEDURE — 82306 VITAMIN D 25 HYDROXY: CPT

## 2024-05-17 NOTE — PROGRESS NOTES
Subjective:   Matilde Connor is a 69 year old female who presents for a Medicare Subsequent Annual Wellness visit (Pt already had Initial Annual Wellness) and scheduled follow up of multiple significant but stable problems    69-year-old female coming in for routine Medicare physical.  Follows up with GI Dr. Hi every 3 months given history of lymphocytic esophagitis.  Was advised to follow-up with an allergist.  No food allergies noted but continues to follow-up with them.  Up-to-date on colonoscopy with Dr. Waters.  Follows up with ENT Dr. Heard for bilateral hearing loss L>R.  Follows up with GYN Dr. Watkins at [facility] for female health matters.  On vaginal estrogen cream.  Stopped doing pelvic floor therapy.  Follows up with endocrinology Dr. Bates for osteoporosis, currently on alendronate.  States next appointment is in June.  Followed up with hematology due to mild leukopenia.  Cleared at this time.  Follows up with ophthalmology Dr. Frazier for cataract and vision checks.  Follows up with Ortho Dr. Mcdonough [facility].  History of right hip labrum tear treated with PRP and left hamstring partial tear status post cold laser therapy.  Currently doing PT.  No acute complaints.    History/Other:   Fall Risk Assessment:   She has been screened for Falls and is low risk.      Cognitive Assessment:   She had a completely normal cognitive assessment - see flowsheet entries     Functional Ability/Status:   Matilde Connor has some abnormal functions as listed below:  She has Hearing problems based on screening of functional status.      Depression Screening (PHQ-2/PHQ-9): PHQ-2 SCORE: 1  , done 5/17/2024   Little interest or pleasure in doing things: 1    Trouble concentrating on things, such as reading the newspaper or watching television: 1    If you checked off any problems, how difficult have these problems made it for you to do your work, take care of things at home, or get along  with other people?: Somewhat difficult    Last [facility] Suicide Screening on 5/17/2024 was No Risk.          Advanced Directives:   She does NOT have a Living Will. [Do you have a living will?: No]  She does NOT have a Power of  for Health Care. [Do you have a healthcare power of ?: No]  Discussed Advance Care Planning with patient (and family/surrogate if present). Standard forms made available to patient in After Visit Summary.      Patient Active Problem List   Diagnosis    Routine medical exam    Urinary urgency    Postmenopausal    SNHL (sensory-neural hearing loss), asymmetrical    Low ferritin    Neck pain    Keratoconjunctivitis sicca, not specified as Sjogren's    Nuclear senile cataract    Vaginal atrophy    Osteoporosis    Age-related osteoporosis without current pathological fracture    Labral tear of hip joint    Myofascial pain    Urge incontinence    Urgency-frequency syndrome    Diarrhea, unspecified    Lymphocytic esophagitis    Partial hamstring tear     Allergies:  She is allergic to miconazole.    Current Medications:  Outpatient Medications Marked as Taking for the 5/17/24 encounter (Office Visit) with Rustam Elaine MD   Medication Sig    alendronate 70 MG Oral Tab tablet    Calcium Carbonate 1250 (500 Ca) MG Oral Chew Tab 90 tablets (112,500 mg total).    pantoprazole 40 MG Oral Tab EC Take 1 tablet (40 mg total) by mouth.    Calcium 200 MG Oral Tab As Directed.    Cholecalciferol (VITAMIN D3) 2400 UNIT/ML Does not apply Liquid Take 4,000 mL by mouth As Directed.    Turmeric (QC TUMERIC COMPLEX OR) Take by mouth.    OMEGA 3-6-9 FATTY ACIDS OR Take by mouth.    Zinc Sulfate (ZINC 15) 66 MG Oral Tab Take by mouth.    Ascorbic Acid (VITAMIN C) 500 MG Oral Cap Take 1 Bottle by mouth As Directed.    Ferrous Sulfate (IRON) 325 (65 Fe) MG Oral Tab     Magnesium 300 MG Oral Cap Take 1 capsule by mouth As Directed.    Estradiol 0.1 MG/GM Vaginal Cream Place 2 g vaginally.       Medical  History:  She  has a past medical history of Anemia and Osteoporosis.  Surgical History:  She  has a past surgical history that includes ; d & c; wisdom teeth removed; and colonoscopy (2009).   Family History:  Her family history includes Cancer in her father, maternal grandmother, and paternal grandfather; Cancer (age of onset: 50) in her sister; Diabetes in her mother; Heart Disorder in her maternal grandfather and mother; Other in her father, mother, and paternal grandfather.  Social History:  She  reports that she has never smoked. She has never used smokeless tobacco. She reports current alcohol use. She reports that she does not use drugs.    Tobacco:  She has never smoked tobacco.    CAGE Alcohol Screen:   CAGE screening score of 0 on 2024, showing low risk of alcohol abuse.      Patient Care Team:  Rustam Elaine MD as PCP - General  Joseluis Heard MD (OTOLARYNGOLOGY)    Review of Systems   Constitutional:  Negative for chills, diaphoresis and fever.   HENT:  Positive for hearing loss. Negative for congestion, ear discharge, ear pain, sinus pressure, sinus pain and sore throat.    Eyes:  Negative for pain and discharge.   Respiratory:  Negative for cough, chest tightness, shortness of breath and wheezing.    Cardiovascular:  Negative for chest pain and palpitations.   Gastrointestinal:  Negative for abdominal pain, diarrhea, nausea and vomiting.   Endocrine: Negative for cold intolerance and heat intolerance.   Genitourinary:  Negative for dysuria, flank pain, frequency and urgency.   Musculoskeletal:  Positive for arthralgias. Negative for joint swelling.   Skin:  Negative for rash.   Neurological:  Negative for dizziness, syncope and headaches.   Psychiatric/Behavioral:  Negative for confusion and hallucinations.           Objective:   Physical Exam  Constitutional:       General: She is not in acute distress.     Appearance: Normal appearance. She is not ill-appearing or toxic-appearing.    HENT:      Head: Normocephalic and atraumatic.      Right Ear: Tympanic membrane and ear canal normal.      Left Ear: Tympanic membrane and ear canal normal.      Mouth/Throat:      Mouth: Mucous membranes are moist.      Pharynx: Oropharynx is clear. No oropharyngeal exudate or posterior oropharyngeal erythema.   Eyes:      Extraocular Movements: Extraocular movements intact.      Pupils: Pupils are equal, round, and reactive to light.   Cardiovascular:      Rate and Rhythm: Normal rate and regular rhythm.      Heart sounds: Normal heart sounds. No murmur heard.     No gallop.   Pulmonary:      Effort: Pulmonary effort is normal. No respiratory distress.      Breath sounds: Normal breath sounds. No stridor. No wheezing, rhonchi or rales.   Abdominal:      General: Bowel sounds are normal.      Palpations: Abdomen is soft.      Tenderness: There is no abdominal tenderness. There is no right CVA tenderness, left CVA tenderness or guarding.   Musculoskeletal:         General: No swelling.      Cervical back: Normal range of motion and neck supple. No rigidity or tenderness.      Right lower leg: No edema.      Left lower leg: No edema.   Skin:     General: Skin is warm and dry.   Neurological:      General: No focal deficit present.      Mental Status: She is alert and oriented to person, place, and time. Mental status is at baseline.      Cranial Nerves: No cranial nerve deficit.      Sensory: No sensory deficit.      Motor: Motor function is intact. No weakness.      Gait: Gait normal.   Psychiatric:         Mood and Affect: Mood normal.         Behavior: Behavior normal.         Thought Content: Thought content normal.         Judgment: Judgment normal.            /72 (BP Location: Left arm, Patient Position: Sitting, Cuff Size: adult)   Pulse 63   Temp 97.5 °F (36.4 °C) (Temporal)   Ht 5' 2\" (1.575 m)   Wt 118 lb (53.5 kg)   LMP  (LMP Unknown)   SpO2 99%   BMI 21.58 kg/m²  Estimated body mass index  is 21.58 kg/m² as calculated from the following:    Height as of this encounter: 5' 2\" (1.575 m).    Weight as of this encounter: 118 lb (53.5 kg).    Medicare Hearing Assessment:   Hearing Screening    Time taken: 5/17/2024  9:00 AM  Screening Method: Finger Rub  Finger Rub Result: Pass         Visual Acuity:   Right Eye Visual Acuity: Corrected Right Eye Chart Acuity: 20/30   Left Eye Visual Acuity: Corrected Left Eye Chart Acuity: 20/25   Both Eyes Visual Acuity: Corrected Both Eyes Chart Acuity: 20/30   Able To Tolerate Visual Acuity: Yes        Assessment & Plan:   Matilde Connor is a 69 year old female who presents for a Medicare Assessment.     1. Encounter for annual health examination (Primary)  -Healthy diet and lifestyle.  -Exercise as tolerated.  -Dental exam every 6 months or as recommended by dentist.  -Eye exams as recommended by specialist.  -     Comp Metabolic Panel (14); Future; Expected date: 05/17/2024  -     Hemoglobin A1C; Future; Expected date: 05/17/2024  -     Lipid Panel; Future; Expected date: 05/17/2024  -     TSH W Reflex To Free T4; Future; Expected date: 05/17/2024  2. Hearing loss, unspecified hearing loss type, unspecified laterality  Per ENT recommend that she consider amplification in 1 or both ears.  To follow-up in 1 year from last for repeat audiogram .  -Follow-up with ENT as recommended  3. Leukopenia, unspecified type  Stable and cleared by hematology.  4. Urinary urgency  -Continue follow-up with GYN  5. Postmenopausal  -Continue follow-up with GYN  6. Vaginal atrophy  -Continue follow-up with GYN  7. Age-related osteoporosis without current pathological fracture  On alendronate.  -Continue follow-up with endocrinology  -     XR DEXA BONE DENSITOMETRY (CPT=77080); Future; Expected date: 05/17/2024  -     Vitamin D; Future; Expected date: 05/17/2024  8. Nuclear senile cataract, unspecified laterality  -Continue follow-up with ophthalmology  9. Immunization  counseling  Discussed pending immunizations with patient.  -     Prevnar 20 (PCV20) [01286]  10. Encounter for immunization  -     Prevnar 20 (PCV20) [05857]      The patient indicates understanding of these issues and agrees to the plan.  Imaging studies ordered.  Lab work ordered.  Reinforced healthy diet, lifestyle, and exercise.      Return in about 1 year (around 5/17/2025).     Rustam Elaine MD, 5/17/2024     Supplementary Documentation:   General Health:  In the past six months, have you lost more than 10 pounds without trying?: 2 - No  Has your appetite been poor?: No  Type of Diet: Balanced  How does the patient maintain a good energy level?: Stretching  How would you describe your daily physical activity?: Light  How would you describe your current health state?: Fair  How do you maintain positive mental well-being?: Social Interaction;Visiting Family;Visiting Friends  On a scale of 0 to 10, with 0 being no pain and 10 being severe pain, what is your pain level?: 4 - (Moderate) (arm)  In the past six months, have you experienced urine leakage?: 1-Yes (slight)  At any time do you feel concerned for the safety/well-being of yourself and/or your children, in your home or elsewhere?: No  Have you had any immunizations at another office such as Influenza, Hepatitis B, Tetanus, or Pneumococcal?: No       Matilde Connor's SCREENING SCHEDULE   Tests on this list are recommended by your physician but may not be covered, or covered at this frequency, by your insurer.   Please check with your insurance carrier before scheduling to verify coverage.   PREVENTATIVE SERVICES FREQUENCY &  COVERAGE DETAILS LAST COMPLETION DATE   Diabetes Screening    Fasting Blood Sugar /  Glucose    One screening every 12 months if never tested or if previously tested but not diagnosed with pre-diabetes   One screening every 6 months if diagnosed with pre-diabetes Lab Results   Component Value Date    GLU 89 05/17/2024         Cardiovascular Disease Screening    Lipid Panel  Cholesterol  Lipoprotein (HDL)  Triglycerides Covered every 5 years for all Medicare beneficiaries without apparent signs or symptoms of cardiovascular disease Lab Results   Component Value Date    CHOLEST 217 (H) 05/17/2024    HDL 89 (H) 05/17/2024     (H) 05/17/2024    TRIG 47 05/17/2024         Electrocardiogram (EKG)   Covered if needed at Welcome to Medicare, and non-screening if indicated for medical reasons 02/23/2023      Ultrasound Screening for Abdominal Aortic Aneurysm (AAA) Covered once in a lifetime for one of the following risk factors    Men who are 65-75 years old and have ever smoked    Anyone with a family history -     Colorectal Cancer Screening  Covered for ages 50-85; only need ONE of the following:    Colonoscopy   Covered every 10 years    Covered every 2 years if patient is at high risk or previous colonoscopy was abnormal 11/08/2019    Health Maintenance   Topic Date Due    Colorectal Cancer Screening  11/08/2029       Flexible Sigmoidoscopy   Covered every 4 years -    Fecal Occult Blood Test Covered annually -   Bone Density Screening    Bone density screening    Covered every 2 years after age 65 if diagnosed with risk of osteoporosis or estrogen deficiency.    Covered yearly for long-term glucocorticoid medication use (Steroids) Last Dexa Scan:    XR DEXA BONE DENSITOMETRY (CPT=77080) 07/06/2022      No recommendations at this time   Pap and Pelvic    Pap   Covered every 2 years for women at normal risk; Annually if at high risk -  No recommendations at this time    Chlamydia Annually if high risk -  No recommendations at this time   Screening Mammogram    Mammogram     Recommend annually for all female patients aged 40 and older    One baseline mammogram covered for patients aged 35-39 02/09/2024    Health Maintenance   Topic Date Due    Mammogram  02/09/2025       Immunizations    Influenza Covered once per flu season  Please  get every year -  No recommendations at this time    Pneumococcal Each vaccine ([id number] & [id number]) covered once after 65 Prevnar 13: -    [id number]: -     No recommendations at this time    Hepatitis B One screening covered for patients with certain risk factors   -  No recommendations at this time    Tetanus Toxoid Not covered by Medicare Part B unless medically necessary (cut with metal); may be covered with your pharmacy prescription benefits 09/27/2005    Tetanus, Diptheria and Pertusis TD and TDaP Not covered by Medicare Part B -  No recommendations at this time    Zoster Not covered by Medicare Part B; may be covered with your pharmacy  prescription benefits -  No recommendations at this time

## 2024-05-17 NOTE — PATIENT INSTRUCTIONS
Matilde Connor's SCREENING SCHEDULE   Tests on this list are recommended by your physician but may not be covered, or covered at this frequency, by your insurer.   Please check with your insurance carrier before scheduling to verify coverage.   PREVENTATIVE SERVICES FREQUENCY &  COVERAGE DETAILS LAST COMPLETION DATE   Diabetes Screening    Fasting Blood Sugar /  Glucose    One screening every 12 months if never tested or if previously tested but not diagnosed with pre-diabetes   One screening every 6 months if diagnosed with pre-diabetes Lab Results   Component Value Date    GLU 96 06/26/2023        Cardiovascular Disease Screening    Lipid Panel  Cholesterol  Lipoprotein (HDL)  Triglycerides Covered every 5 years for all Medicare beneficiaries without apparent signs or symptoms of cardiovascular disease Lab Results   Component Value Date    CHOLEST 206 (H) 06/26/2023    HDL 98 (H) 06/26/2023    LDL 98 06/26/2023    TRIG 55 06/26/2023         Electrocardiogram (EKG)   Covered if needed at Welcome to Medicare, and non-screening if indicated for medical reasons 02/23/2023      Ultrasound Screening for Abdominal Aortic Aneurysm (AAA) Covered once in a lifetime for one of the following risk factors   • Men who are 65-75 years old and have ever smoked   • Anyone with a family history -     Colorectal Cancer Screening  Covered for ages 50-85; only need ONE of the following:    Colonoscopy   Covered every 10 years    Covered every 2 years if patient is at high risk or previous colonoscopy was abnormal 11/08/2019    Health Maintenance   Topic Date Due   • Colorectal Cancer Screening  11/08/2029       Flexible Sigmoidoscopy   Covered every 4 years -    Fecal Occult Blood Test Covered annually -   Bone Density Screening    Bone density screening    Covered every 2 years after age 65 if diagnosed with risk of osteoporosis or estrogen deficiency.    Covered yearly for long-term glucocorticoid medication use (Steroids)  Last Dexa Scan:    XR DEXA BONE DENSITOMETRY (CPT=77080) 07/06/2022      No recommendations at this time   Pap and Pelvic    Pap   Covered every 2 years for women at normal risk; Annually if at high risk -  No recommendations at this time    Chlamydia Annually if high risk -  No recommendations at this time   Screening Mammogram    Mammogram     Recommend annually for all female patients aged 40 and older    One baseline mammogram covered for patients aged 35-39 02/09/2024    Health Maintenance   Topic Date Due   • Mammogram  02/09/2025       Immunizations    Influenza Covered once per flu season  Please get every year -  No recommendations at this time    Pneumococcal Each vaccine ([id number] & [id number]) covered once after 65 Prevnar 13: -    [id number]: -     No recommendations at this time    Hepatitis B One screening covered for patients with certain risk factors   -  No recommendations at this time    Tetanus Toxoid Not covered by Medicare Part B unless medically necessary (cut with metal); may be covered with your pharmacy prescription benefits 09/27/2005    Tetanus, Diptheria and Pertusis TD and TDaP Not covered by Medicare Part B -  No recommendations at this time    Zoster Not covered by Medicare Part B; may be covered with your pharmacy  prescription benefits -  No recommendations at this time

## 2024-06-18 ENCOUNTER — OFFICE VISIT (OUTPATIENT)
Dept: OTOLARYNGOLOGY | Facility: CLINIC | Age: 69
End: 2024-06-18

## 2024-06-18 ENCOUNTER — OFFICE VISIT (OUTPATIENT)
Dept: AUDIOLOGY | Facility: CLINIC | Age: 69
End: 2024-06-18

## 2024-06-18 DIAGNOSIS — H90.3 ASYMMETRICAL SENSORINEURAL HEARING LOSS: Primary | ICD-10-CM

## 2024-06-18 DIAGNOSIS — H91.90 HEARING LOSS, UNSPECIFIED HEARING LOSS TYPE, UNSPECIFIED LATERALITY: Primary | ICD-10-CM

## 2024-06-18 PROCEDURE — 92567 TYMPANOMETRY: CPT | Performed by: AUDIOLOGIST

## 2024-06-18 PROCEDURE — 99213 OFFICE O/P EST LOW 20 MIN: CPT | Performed by: OTOLARYNGOLOGY

## 2024-06-18 PROCEDURE — 92557 COMPREHENSIVE HEARING TEST: CPT | Performed by: AUDIOLOGIST

## 2024-06-18 NOTE — PROGRESS NOTES
Matilde Connor is a 69 year old female.    Chief Complaint   Patient presents with    Hearing Loss     Patient is here due to annual hearing test, reports decrease in hearing       HISTORY OF PRESENT ILLNESS  She presents with a 3 to 4-month history of decreased hearing on the left.  She states that this came on suddenly without any associated symptoms of vertigo or tinnitus.  She denies having any type of acute viral or upper respiratory tract infection during the onset of this hearing loss.  She states that she was lying in bed and turned in one direction and realized that she cannot hear very well from the other side.  No changes in hearing since then.  Took some time before she had an audiogram which was performed on January 6 of 2020 with a finding of normal downsloping to mild sensorineural hearing loss on the right with mild downsloping to moderate sensorineural hearing loss on the left.  Speech is clear discrimination scores are essentially 100% bilaterally and tympanograms are normal bilaterally.  No other signs, symptoms or complaints.  No history of diabetes stroke or hypertension.     1/28/20 last visit she was started on prednisone burst and taper.  She notes no change in her hearing although she does have some perceptual changes with the ear feeling less full at times.  No other new signs, symptoms or complaints.  Audiogram was repeated today demonstrating no change in her pure-tone thresholds on the left.     6/4/2020 she presents today with exacerbation of her tinnitus.  She feels that the tinnitus that she has had primarily on the left side is now louder.  She does admit to having significant stress and anxiety due to the current pandemic and the newer race issues occurring in the [facility].  She states that these issues with tinnitus started around the time of the onset of the pandemic.  She does describe herself as being very worried about things.  Audiogram performed today demonstrates  no improvement in her hearing with stable pure-tone thresholds at all frequencies since she originally had a problem in early January.  I did recommend an E BR versus an MRI to rule out a retrocochlear process and she chose to do an ABR.  ABR performed in January 2020 demonstrates normal potentials bilaterally.  No evidence of a retrocochlear abnormality.  Her previous studies and this recent ABR results were discussed at length today.     4/28/22 I last saw her 2 years ago and at that time she had a normal ABR with a known history of hearing loss on the left.  Does not use amplification in that ear as she feels that she hears reasonably well on the other side.  Does complain of statically sound on the left side.  This is constant and is better when there is any sounds present.  She does feel that her hearing has decreased on the left side as she is asking for things to be repeated more frequently and also notes that she cannot understand certain people as she feels that they are not articulating as well as they should.  Audiogram was performed today based on her complaints with a finding of progressive sensorineural hearing loss at the mid and high frequencies only on the left.  Right is stable.  Here to discuss further management     6/17/22 she presents today with a sensation of decreased hearing on the left.  Previously noted to have normal ABR and more recently had an MRI that showed no abnormalities.  Repeat audiogram was performed based on her concerns of decreased hearing which showed no changes at any frequencies on the left.  Speech discrimination scores are unchanged from last study.  She does have a history of TMJ she is not sure to see if she has been clenching or grinding more recently.     6/16/23 doing well no new complaints or concerns here for routine hearing test.  Previous MRI of the brain reveals no abnormalities previous ABR suggest no retrocochlear abnormalities.  Audiogram performed today  demonstrates essentially unchanged hearing with mild downsloping to moderate sensory hearing loss on the left with normal hearing at the low and mid frequencies on the right and a mild high-frequency loss with normal tympanograms and speech discrimination score      24 she feels that her hearing is worsened.  Now noticing more frequently that she is having difficulty hearing people.  Previous audiogram demonstrated normal downsloping to mild hearing loss on the right and mild downsloping to moderate hearing loss on the left with normal speech discrimination scores and tympanograms.  Also complains of some fullness in her left ear with itchiness and a sensation of something crawling in her ears at times.  She does grind her teeth and will be getting a bite guard      Social History     Socioeconomic History    Marital status:    Tobacco Use    Smoking status: Never    Smokeless tobacco: Never   Vaping Use    Vaping status: Never Used   Substance and Sexual Activity    Alcohol use: Yes     Comment: occ    Drug use: No   Other Topics Concern    Exercise Yes    Seat Belt Yes    Special Diet Yes    Stress Concern Yes    Weight Concern No       Family History   Problem Relation Age of Onset    Cancer Father         Leukemia    Other (Other) Father     Heart Disorder Mother     Diabetes Mother     Other (Other) Mother     Cancer Maternal Grandmother         breast cancer    Heart Disorder Maternal Grandfather     Cancer Paternal Grandfather         stomach cancer    Other (Other) Paternal Grandfather     Cancer Sister 50        breast, uterine cancer       Past Medical History:    Anemia    Osteoporosis       Past Surgical History:   Procedure Laterality Date          X4    Colonoscopy  2009    [facility]    D & c      [place] teeth removed           REVIEW OF SYSTEMS    System Neg/Pos Details   Constitutional Negative Fatigue, fever and weight loss.   ENMT Negative Drooling.   Eyes Negative  Blurred vision and vision changes.   Respiratory Negative Dyspnea and wheezing.   Cardio Negative Chest pain, irregular heartbeat/palpitations and syncope.   GI Negative Abdominal pain and diarrhea.   Endocrine Negative Cold intolerance and heat intolerance.   Neuro Negative Tremors.   Psych Negative Anxiety and depression.   Integumentary Negative Frequent skin infections, pigment change and rash.   Hema/Lymph Negative Easy bleeding and easy bruising.           PHYSICAL EXAM    LMP  (LMP Unknown)        Constitutional Normal Overall appearance - Normal.   Psychiatric Normal Orientation - Oriented to time, place, person & situation. Appropriate mood and affect.   Neck Exam Normal Inspection - Normal. Palpation - Normal. Parotid gland - Normal. Thyroid gland - Normal.   Eyes Normal Conjunctiva - Right: Normal, Left: Normal. Pupil - Right: Normal, Left: Normal. Fundus - Right: Normal, Left: Normal.   Neurological Normal Memory - Normal. Cranial nerves - Cranial nerves II through XII grossly intact.   Head/Face Normal Facial features - Normal. Eyebrows - Normal. Skull - Normal.        Nasopharynx Normal External nose - Normal. Lips/teeth/gums - Normal. Tonsils - Normal. Oropharynx - Normal.   Ears Normal Inspection - Right: Normal, Left: Normal. Canal - Right: Normal, Left: Normal. TM - Right: Normal, Left: Normal.   Skin Normal Inspection - Normal.        Lymph Detail Normal Submental. Submandibular. Anterior cervical. Posterior cervical. Supraclavicular.        Nose/Mouth/Throat Normal External nose - Normal. Lips/teeth/gums - Normal. Tonsils - Normal. Oropharynx - Normal.   Nose/Mouth/Throat Normal Nares - Right: Normal Left: Normal. Septum -Normal  Turbinates - Right: Normal, Left: Normal.       Current Outpatient Medications:     alendronate 70 MG Oral Tab, tablet, Disp: , Rfl:     Calcium Carbonate 1250 (500 Ca) MG Oral Chew Tab, 90 tablets (112,500 mg total)., Disp: , Rfl:     pantoprazole 40 MG Oral Tab EC,  Take 1 tablet (40 mg total) by mouth., Disp: , Rfl:     Calcium 200 MG Oral Tab, As Directed., Disp: , Rfl:     Cholecalciferol (VITAMIN D3) 2400 UNIT/ML Does not apply Liquid, Take 4,000 mL by mouth As Directed., Disp: , Rfl:     Turmeric (QC TUMERIC COMPLEX OR), Take by mouth., Disp: , Rfl:     OMEGA 3-6-9 FATTY ACIDS OR, Take by mouth., Disp: , Rfl:     Zinc Sulfate (ZINC 15) 66 MG Oral Tab, Take by mouth., Disp: , Rfl:     Ascorbic Acid (VITAMIN C) 500 MG Oral Cap, Take 1 Bottle by mouth As Directed., Disp: , Rfl:     Ferrous Sulfate (IRON) 325 (65 Fe) MG Oral Tab, , Disp: , Rfl:     Magnesium 300 MG Oral Cap, Take 1 capsule by mouth As Directed., Disp: , Rfl:     Estradiol 0.1 MG/GM Vaginal Cream, Place 2 g vaginally., Disp: , Rfl:   ASSESSMENT AND PLAN    1. Hearing loss, unspecified hearing loss type, unspecified laterality  Stable hearing loss.  I did recommend she consider amplification.  Return to see me in 1 year for repeat audiogram.  This given to the patient.  She will contact her insurance to see what kind of hearing aid benefits are available to her.  She does grind her teeth and I suspect that this is causing her itchiness and sensation of something in her ears.  She will be getting a bite guard in the near future  - Audiology Referral - [place] ([facility])        This note was prepared using Dragon Medical voice recognition dictation software. As a result errors may occur. When identified these errors have been corrected. While every attempt is made to correct errors during dictation discrepancies may still exist    Joseluis Heard MD    6/18/2024    10:05 AM

## 2024-07-10 ENCOUNTER — OFFICE VISIT (OUTPATIENT)
Dept: SURGERY | Facility: CLINIC | Age: 69
End: 2024-07-10

## 2024-07-10 DIAGNOSIS — D17.9 INTRAMUSCULAR LIPOMA: Primary | ICD-10-CM

## 2024-07-10 PROCEDURE — 99213 OFFICE O/P EST LOW 20 MIN: CPT

## 2024-07-10 NOTE — H&P
HPI:    Patient ID: Matilde Connor is a 69 year old female presenting with No chief complaint on file.      Matilde is a 68yo female presenting for consultation from Dr. Johnson for a left arm lipoma. She was seen previously by Dr. Koehler in , and an US and MRI were performed which was consistent with an intramuscular lipoma. At the time, she did not desire excision and preferred to keep an eye on it. She has remained asymptomatic, no pain, burning, or numbness down her arm. No weakness. She was advised to evaluate the lipoma to see if it has grown in size.    Past Medical History:    Anemia    Osteoporosis     Past Surgical History:   Procedure Laterality Date          X4    Colonoscopy  2009    [facility]    D & c      [place] teeth removed       Family History   Problem Relation Age of Onset    Cancer Father         Leukemia    Other (Other) Father     Heart Disorder Mother     Diabetes Mother     Other (Other) Mother     Cancer Maternal Grandmother         breast cancer    Heart Disorder Maternal Grandfather     Cancer Paternal Grandfather         stomach cancer    Other (Other) Paternal Grandfather     Cancer Sister 50        breast, uterine cancer     Social History     Socioeconomic History    Marital status:      Spouse name: Not on file    Number of children: Not on file    Years of education: Not on file    Highest education level: Not on file   Occupational History    Not on file   Tobacco Use    Smoking status: Never    Smokeless tobacco: Never   Vaping Use    Vaping status: Never Used   Substance and Sexual Activity    Alcohol use: Yes     Comment: occ    Drug use: No    Sexual activity: Not on file   Other Topics Concern    Caffeine Concern Not Asked    Exercise Yes    Seat Belt Yes    Special Diet Yes    Stress Concern Yes    Weight Concern No   Social History Narrative    4 children (grown and living on their own)    Lives with     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)     Social Determinants of Health     Financial Resource Strain: Not on file   Food Insecurity: Not on file   Transportation Needs: Not on file   Physical Activity: Not on file   Stress: Not on file   Social Connections: Unknown (3/18/2021)    [facility]    Social Connections     Conversations with friends/family/neighbors per week: Not on file   Housing Stability: Low Risk  (7/8/2021)    [facility]    Housing Stability     Mortgage Payment Concerns?: Not on file     Number of Places Lived in the Last Year: Not on file     Unstable Housing?: Not on file       Review of Systems   Constitutional:  Negative for chills, diaphoresis and fever.   Respiratory:  Negative for shortness of breath.    Cardiovascular:  Negative for chest pain.   Gastrointestinal:  Negative for abdominal distention, abdominal pain, nausea and vomiting.   Musculoskeletal:  Negative for arthralgias and myalgias.   Neurological:  Negative for weakness and numbness.           Current Outpatient Medications   Medication Sig Dispense Refill    alendronate 70 MG Oral Tab tablet      Calcium Carbonate 1250 (500 Ca) MG Oral Chew Tab 90 tablets (112,500 mg total).      pantoprazole 40 MG Oral Tab EC Take 1 tablet (40 mg total) by mouth.      Calcium 200 MG Oral Tab As Directed.      Cholecalciferol (VITAMIN D3) 2400 UNIT/ML Does not apply Liquid Take 4,000 mL by mouth As Directed.      Turmeric (QC TUMERIC COMPLEX OR) Take by mouth.      OMEGA 3-6-9 FATTY ACIDS OR Take by mouth.      Zinc Sulfate (ZINC 15) 66 MG Oral Tab Take by mouth.      Ascorbic Acid (VITAMIN C) 500 MG Oral Cap Take 1 Bottle by mouth As Directed.      Ferrous Sulfate (IRON) 325 (65 Fe) MG Oral Tab       Magnesium 300 MG Oral Cap Take 1 capsule by mouth As Directed.      Estradiol 0.1 MG/GM Vaginal Cream Place 2 g  vaginally.         Allergies:  Allergies   Allergen Reactions    Miconazole ITCHING     Burning and itching       PHYSICAL EXAM:   LMP  (LMP Unknown)   Physical Exam  Constitutional:       General: She is not in acute distress.     Appearance: Normal appearance.   HENT:      Head: Normocephalic and atraumatic.      Left Ear: External ear normal.      Nose: Nose normal.   Pulmonary:      Effort: Pulmonary effort is normal. No respiratory distress.   Musculoskeletal:      Comments: Soft tissue mass on left upper arm. Soft, non-tender.   Neurological:      Mental Status: She is alert and oriented to person, place, and time.   Psychiatric:         Mood and Affect: Mood normal.         Thought Content: Thought content normal.         Judgment: Judgment normal.               ASSESSMENT/PLAN:   Diagnoses and all orders for this visit:    Intramuscular lipoma    Pt unsure if she would like lipoma excised at this time, but would like imaging to assess growth. Obtain MRI, pt to follow up with Dr. Koehler to discuss results and plan moving forward. If excision is desired, would have to be performed in the OR due to it's location.         Ryley Aguila PA-C  7/10/2024

## 2024-07-16 ENCOUNTER — HOSPITAL ENCOUNTER (OUTPATIENT)
Dept: BONE DENSITY | Age: 69
Discharge: HOME | End: 2024-07-16
Attending: STUDENT IN AN ORGANIZED HEALTH CARE EDUCATION/TRAINING PROGRAM
Payer: MEDICARE

## 2024-07-16 DIAGNOSIS — M81.0 AGE-RELATED OSTEOPOROSIS WITHOUT CURRENT PATHOLOGICAL FRACTURE: ICD-10-CM

## 2024-07-16 PROCEDURE — 77080 DXA BONE DENSITY AXIAL: CPT | Performed by: STUDENT IN AN ORGANIZED HEALTH CARE EDUCATION/TRAINING PROGRAM

## 2024-07-28 ENCOUNTER — HOSPITAL ENCOUNTER (OUTPATIENT)
Dept: MRI IMAGING | Facility: HOSPITAL | Age: 69
End: 2024-07-28
Payer: MEDICARE

## 2024-07-28 ENCOUNTER — HOSPITAL ENCOUNTER (OUTPATIENT)
Dept: MRI IMAGING | Facility: HOSPITAL | Age: 69
Discharge: HOME | End: 2024-07-28
Payer: MEDICARE

## 2024-07-28 DIAGNOSIS — D17.9 INTRAMUSCULAR LIPOMA: ICD-10-CM

## 2024-07-28 PROCEDURE — A9575 INJ GADOTERATE MEGLUMI 0.1ML: HCPCS

## 2024-07-28 PROCEDURE — 73220 MRI UPPR EXTREMITY W/O&W/DYE: CPT

## 2024-07-28 RX ORDER — GADOTERATE MEGLUMINE 376.9 MG/ML
20 INJECTION INTRAVENOUS
Status: COMPLETED | OUTPATIENT
Start: 2024-07-28 | End: 2024-07-28

## 2024-07-28 RX ADMIN — GADOTERATE MEGLUMINE 17 ML: 376.9 INJECTION INTRAVENOUS at 08:57:00

## 2024-08-01 ENCOUNTER — TELEPHONE (OUTPATIENT)
Dept: SURGERY | Facility: CLINIC | Age: 69
End: 2024-08-01

## 2024-08-05 ENCOUNTER — HOSPITAL ENCOUNTER (OUTPATIENT)
Dept: GENERAL RADIOLOGY | Facility: HOSPITAL | Age: 69
Discharge: HOME | End: 2024-08-05
Attending: ORTHOPAEDIC SURGERY
Payer: MEDICARE

## 2024-08-05 ENCOUNTER — TELEPHONE (OUTPATIENT)
Dept: ORTHOPEDICS CLINIC | Facility: CLINIC | Age: 69
End: 2024-08-05

## 2024-08-05 ENCOUNTER — OFFICE VISIT (OUTPATIENT)
Dept: ORTHOPEDICS CLINIC | Facility: CLINIC | Age: 69
End: 2024-08-05

## 2024-08-05 DIAGNOSIS — R52 PAIN: ICD-10-CM

## 2024-08-05 DIAGNOSIS — R22.32 MASS OF ARM, LEFT: Primary | ICD-10-CM

## 2024-08-05 PROCEDURE — 73060 X-RAY EXAM OF HUMERUS: CPT | Performed by: ORTHOPAEDIC SURGERY

## 2024-08-05 NOTE — H&P
NURSING INTAKE COMMENTS:   Chief Complaint   Patient presents with    Consult     L bicep - Pt states lipoma  in left bicep. Pt states she feels pulling and tension in bicep.  Has MRI in system.       HPI: This 69 year old right-hand-dominant female presents today with growing mass left bicep.  She had MRI several years ago which showed the mass.  Recent MRI shows it has grown.  She also subjectively feels it has grown.  There is no pain except if she extends her arm behind her she might have a little pulling.  Occasionally she gets a little tingling to the left fourth fingertip but aside for that has no neurologic complaint.  She denies unexplained weight loss, fever, or chills.  MRI shows it is a lipoma.    She lives independently with her .  The kids are all out of the house.  She works from a desk mostly from office.  She walks or bikes occasionally.  She is in good shape physically.  Orthopedically she has a labral tear of her hip treated with PRP injection and physical therapy successfully so far.  She also had a partial tear of the hamstring on the left thigh which is also improving.    Past Medical History:    Anemia    Osteoporosis     Past Surgical History:   Procedure Laterality Date          X4    Colonoscopy  2009    [facility]    D & c      [place] teeth removed       Current Outpatient Medications   Medication Sig Dispense Refill    alendronate 70 MG Oral Tab tablet      Calcium Carbonate 1250 (500 Ca) MG Oral Chew Tab 90 tablets (112,500 mg total).      pantoprazole 40 MG Oral Tab EC Take 1 tablet (40 mg total) by mouth.      Calcium 200 MG Oral Tab As Directed.      Cholecalciferol (VITAMIN D3) 2400 UNIT/ML Does not apply Liquid Take 4,000 mL by mouth As Directed.      Turmeric (QC TUMERIC COMPLEX OR) Take by mouth.      OMEGA 3-6-9 FATTY ACIDS OR Take by mouth.      Zinc Sulfate (ZINC 15) 66 MG Oral Tab Take by mouth.      Ascorbic Acid (VITAMIN C) 500 MG Oral Cap Take 1 Bottle  by mouth As Directed.      Ferrous Sulfate (IRON) 325 (65 Fe) MG Oral Tab       Magnesium 300 MG Oral Cap Take 1 capsule by mouth As Directed.      Estradiol 0.1 MG/GM Vaginal Cream Place 2 g vaginally.       Allergies   Allergen Reactions    Miconazole ITCHING     Burning and itching      Family History   Problem Relation Age of Onset    Cancer Father         Leukemia    Other (Other) Father     Heart Disorder Mother     Diabetes Mother     Other (Other) Mother     Cancer Maternal Grandmother         breast cancer    Heart Disorder Maternal Grandfather     Cancer Paternal Grandfather         stomach cancer    Other (Other) Paternal Grandfather     Cancer Sister 50        breast, uterine cancer     No family Hx of DVT/PE    Social History     Occupational History    Not on file   Tobacco Use    Smoking status: Never    Smokeless tobacco: Never   Vaping Use    Vaping status: Never Used   Substance and Sexual Activity    Alcohol use: Yes     Comment: occ    Drug use: No    Sexual activity: Not on file        Review of Systems:  GENERAL: feels generally well, no significant weight loss or weight gain  SKIN: no ulcerated or worrisome skin lesions  EYES:denies blurred vision or double vision  HEENT: denies new nasal congestion, sinus pain or ST  LUNGS: denies shortness of breath  CARDIOVASCULAR: denies chest pain  GI: no hematemesis, no worsening heartburn, no diarrhea  : no dysuria, no blood in urine, no difficulty urinating, no incontinence  MUSCULOSKELETAL: no other musculoskeletal complaints other than in HPI  NEURO: no numbness or tingling, no weakness or balance disorder  PSYCHE: no depression or anxiety  HEMATOLOGIC: no hx of blood dyscrasia, no Hx DVT/PE  ENDOCRINE: no thyroid or diabetes issues  ALL/ASTHMA: no new hx of severe allergy or asthma    Physical Examination:    LMP  (LMP Unknown)   Constitutional: appears well hydrated, alert and responsive, no acute distress noted  Extremities: The left shoulder  and left elbow are benign with full range of motion and no tenderness.  No weakness.  Musculoskeletal: The mass is not readily visible at rest bowel or if she tries to make a muscle, it is clearly asymmetric compared to the right arm.  No tenderness or puckering the skin.  It feels mobile.  Neurological: Normal motor and sensory left upper extremity today.    Imaging: MRI as below.  I agree with the radiologist.  When compared to the prior MRI 2 years ago, it has grown and the patient subjectively feels the same.  I did not see any significant edema in the muscle to suggest malignancy.      MRI UPPER ARM (W+WO), LEFT (CPT=73220)    Result Date: 7/29/2024  PROCEDURE: MRI UPPER ARM (W+WO), LEFT (CPT=73220)  COMPARISON: None.  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  Images were performed without and with intravenous gadolinium contrast.   FINDINGS:  BONES: Mild degenerative change at the acromioclavicular joint. No acute fracture, dislocation, or marrow replacing lesion. SOFT TISSUES: There is a 4 x 2 x 12.6 cm T1 hyperintense, well encapsulated lesion which suppresses signal on the inversion recovery and fat-suppressed sequences within the short head of the biceps muscle which corresponds to patient's palpable abnormality.  No enhancement.  There are thin nonenhancing septations within the mass which are unchanged.  This has slightly increased in size since 9/8/2022 when it measured approximately 9 cm in maximum dimensions.   There is thickening with increased signal seen within the supraspinatus and infraspinatus tendons.  No full-thickness rotator cuff tear. Remainder of the muscles otherwise have a normal signal intensity and bulk. There is thickening with increased fluid  in the subacromial subdeltoid bursa. Focal amount of increased fluid is seen within the bicipital tendon sheath, out of proportion to  what is seen within the glenohumeral joint consistent with tenosynovitis.   EFFUSION: None visible.  OTHER: Negative.          CONCLUSION:   12.6 cm intramuscular fat intensity mass within the short head biceps muscle.  Differentials include a simple lipoma versus an atypical lipomatous tumor.  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 Given the slow interval increase in size since 9/8/2022, non emergent orthopedic oncologic evaluation is suggested.  If tissue sampling and histologic analysis is considered, MDM2 gene amplification should be performed.  Mild tendinosis of the supraspinatus and infraspinatus.  no full-thickness rotator cuff tear.  Subacromial subdeltoid bursitis.  Mild long head biceps tenosynovitis.      Dictated by (CST): Cliff Ge MD on 7/29/2024 at 4:30 PM     Finalized by (CST): Cliff Ge MD on 7/29/2024 at 4:35 PM          XR DEXA BONE DENSITOMETRY (CPT=77080)    Result Date: 7/16/2024  PROCEDURE: XR DEXA BONE DENSITOMETRY (CPT=77080)  COMPARISON: [facility], XR DEXA BONE DENSITOMETRY (CPT=77080), 7/06/2022, 8:03 AM.  INDICATIONS: Age-related osteoporosis without current pathological fracture  TECHNIQUE: Measurement of bone mineral density of the lumbar spine and hip was performed on a Hologic dual energy x-ray absorptiometry scanner.  FINDINGS:   LEFT FEMORAL NECK  BMD: 0.550 gm/sq. cm. T SCORE: -2.7 Z SCORE: -1.0  Change:  Bone mineral density has decreased by 3.2% compared to 07/06/2022.  LEFT TOTAL HIP  BMD: 0.656 gm/sq. cm. T SCORE: -2.3 Z SCORE: -1.0  Change:  Bone mineral density has decreased by 6.9% compared to 07/06/2022, which is statistically significant.  PA LUMBAR SPINE (L1 - L4)  BMD: 0.839 gm/sq. cm. T SCORE: -1.9 Z SCORE: 0.0  Change:  Bone mineral density has increased by 8.7% compared to 07/06/2022, which is statistically significant.    T scores are a comparison to sex-matched patients  with mean peak bone mass and are given in standard deviation (s.d.).  Each 1 s.d. corresponds to approximately 10% below peak normal bone density.   WORLD HEALTH ORGANIZATION CRITERIA NORMAL T SCORE: Above -1 s.d.  OSTEOPENIA T SCORE: Between -1 and -2.5 s.d.  OSTEOPOROSIS T SCORE: -2.5 s.d.  National Osteoporosis Foundation Clinician's Guide to Prevention and Treatment of Osteoporosis recommendations for treatment: Post menopausal women and men age 50 and older presenting with the following should be considered for treatment: * A hip or vertebral (clinical or morphometric) fracture * T score < -2.5 at the femoral neck or spine after appropriate evaluation to exclude secondary causes. * Low bone mass (T score between -1.0 and -2.5 at the femoral neck or spine) and a 10-year probability of a hip fracture > 3% or a 10-year probability of a major osteoporosis-related fracture > 20% based on the [place]-adapted WHO algorithm         CONCLUSION:  1. Scans of the lumbar spine and left hip are consistent with osteoporosis, which according to World Health Organization criteria place the patient at a severely increased risk for fracture.  2. Compared to the prior study, bone mineral density has increased in the lumbar spine and decreased in the left hip.     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    Plan: I discussed with her that I  recommended surgical excision for biopsy.  Rationale is that it is growing.  I discussed there is a 5% chance this could be liposarcoma but 95% chance that it is benign.  We discussed potential risks of surgery such as death, heart attack, stroke, bleeding, infection, blood clot, nerve injury, artery injury, unsightly scar, and the potential need for further surgery.  After discussion, she wished to have it excised.  While she has no red flags I think medical clearances in order.  The plan will be excisional biopsy left bicep lipoma.  Will try to do it under interscalene block and MAC.    Follow Up: No follow-ups on file.    Arsalan Fisher MD

## 2024-08-05 NOTE — TELEPHONE ENCOUNTER
Patient was given pre surgical papers today in clinic. Including Dental clearance form, [facility] Surgery Check List (Medical clearance), Pre Operative Education for Total Joint replacements and [employer] booklet.

## 2024-08-05 NOTE — TELEPHONE ENCOUNTER
Ortho Surgery Request  Surgery: Excisional biopsy mass left upper arm      Surgical Assistant: Raquel Isaacs PA-C  Surgery Day Request:   Estimated Procedure Length: 60 minutes  Anesthesia type: Left interscalene block/MAC   Position: Supine with arm table   Special Needs:[]Mini C-Arm  []Large C-arm  []Nurse Assist [x]SCD's []Surgical Assistant []Ultrasound []Ultrasound Ann  Equipment: Arm table  Location:Main OR  Post Op Visit Date: 10 to 12 days postop  CPT Code:      ICD Code:  Medical Clearance Needed: Medical  Preadmission Testing Orders:  Anesthesia testing protocols and anesthesia pre op orders will be used  Additional PAT orders:  Pre OP Orders:  Use [facility] procedure driven order set in addition to anesthesia protocol  Additional Pre Op Orders:  PCN Allergy:  No  If Yes:   Proceed with PCN/Cephalosporin recommend antibiotic as benefit outweighs risk  Admit:  Hospital outpatient surgery  Home Health  No

## 2024-09-03 ENCOUNTER — OFFICE VISIT (OUTPATIENT)
Dept: ORTHOPEDICS CLINIC | Facility: CLINIC | Age: 69
End: 2024-09-03

## 2024-09-03 DIAGNOSIS — R22.32 MASS OF ARM, LEFT: Primary | ICD-10-CM

## 2024-09-03 PROCEDURE — 99213 OFFICE O/P EST LOW 20 MIN: CPT | Performed by: ORTHOPAEDIC SURGERY

## 2024-09-03 NOTE — PROGRESS NOTES
NURSING INTAKE COMMENTS:   Chief Complaint   Patient presents with    Follow - Up     F/u Left arm biceps mass intermittent  pulling pain 0-1/10 with certain movement. Here for pre- op visit.       HPI: This 69 year old female presents today for further questions regarding the left biceps area lipoma versus liposarcoma found on MRI.  She had some new complaints of a pulling toward the pectoralis.  I explained that one of the heads of the biceps does go to the coracoid.  She also does a little pulling in the long head.  No numbness or tingling.    She had further questions about the anesthesia which will resolve.  I explained that we would use a block rather than local anesthetic which could potentially spread a tumor should this turn out to be malignant.  She would then be sedated.  I did explain that anesthesia has the final determination however as to the type of anesthesia.    Past Medical History:    Anemia    Osteoporosis     Past Surgical History:   Procedure Laterality Date          X4    Colonoscopy  2009    [facility]    D & c      [place] teeth removed       Current Outpatient Medications   Medication Sig Dispense Refill    alendronate 70 MG Oral Tab Now is having yearly infusion      Calcium Carbonate 1250 (500 Ca) MG Oral Chew Tab 90 tablets (112,500 mg total).      pantoprazole 40 MG Oral Tab EC Take 1 tablet (40 mg total) by mouth.      Calcium 200 MG Oral Tab As Directed.      Cholecalciferol (VITAMIN D3) 2400 UNIT/ML Does not apply Liquid Take 4,000 mL by mouth As Directed.      Turmeric (QC TUMERIC COMPLEX OR) Take by mouth.      OMEGA 3-6-9 FATTY ACIDS OR Take by mouth.      Zinc Sulfate (ZINC 15) 66 MG Oral Tab Take by mouth.      Ascorbic Acid (VITAMIN C) 500 MG Oral Cap Take 1 Bottle by mouth As Directed.      Ferrous Sulfate (IRON) 325 (65 Fe) MG Oral Tab       Magnesium 300 MG Oral Cap Take 1 capsule by mouth As Directed.      Estradiol 0.1 MG/GM Vaginal Cream Place 2 g  vaginally.       Allergies   Allergen Reactions    Miconazole ITCHING     Burning and itching      Family History   Problem Relation Age of Onset    Cancer Father         Leukemia    Other (Other) Father     Heart Disorder Mother     Diabetes Mother     Other (Other) Mother     Cancer Maternal Grandmother         breast cancer    Heart Disorder Maternal Grandfather     Cancer Paternal Grandfather         stomach cancer    Other (Other) Paternal Grandfather     Cancer Sister 50        breast, uterine cancer     No family Hx of DVT/PE    Social History     Occupational History    Not on file   Tobacco Use    Smoking status: Never    Smokeless tobacco: Never   Vaping Use    Vaping status: Never Used   Substance and Sexual Activity    Alcohol use: Yes     Comment: occ    Drug use: No    Sexual activity: Not on file        Review of Systems:  GENERAL: feels generally well, no significant weight loss or weight gain  SKIN: no ulcerated or worrisome skin lesions  EYES:denies blurred vision or double vision  HEENT: denies new nasal congestion, sinus pain or ST  LUNGS: denies shortness of breath  CARDIOVASCULAR: denies chest pain  GI: no hematemesis, no worsening heartburn, no diarrhea  : no dysuria, no blood in urine, no difficulty urinating, no incontinence  MUSCULOSKELETAL: no other musculoskeletal complaints other than in HPI  NEURO: no numbness or tingling, no weakness or balance disorder  PSYCHE: no depression or anxiety  HEMATOLOGIC: no hx of blood dyscrasia, no Hx DVT/PE  ENDOCRINE: no thyroid or diabetes issues  ALL/ASTHMA: no new hx of severe allergy or asthma    Physical Examination:    LMP  (LMP Unknown)   Constitutional: appears well hydrated, alert and responsive, no acute distress noted  Extremities: He has unchanged since last visit.  No skin puckering.  Musculoskeletal: Full range of motion shoulder and chest.  She was trying to demonstrate some snapping or cracking but was unable to do so with my hand on  her shoulder.  Neurological: Normal motor and sensory left upper extremity.    Imaging: None taken today.      XR HUMERUS (MIN 2 VIEWS), LEFT (CPT=73060)    Result Date: 8/5/2024  PROCEDURE: XR HUMERUS (MIN 2 VIEWS), LEFT (CPT=73060)  COMPARISON: [facility], MRI UPPER ARM (W+WO), LEFT (CPT=73220), 7/28/2024, 8:28 AM.   INDICATIONS: bump on midshaft of left humerus for many years. no trauma  TECHNIQUE: 2 views were obtained.   FINDINGS:  BONES: No significant arthropathy, fracture or acute abnormality. SOFT TISSUES: In ovoid hypodense region is suggested over the volar aspect of the mid diaphysis of the humerus measuring approximately 22 x 38 x 94 mm. EFFUSION: None visible. OTHER: Negative.         CONCLUSION: No acute osseous abnormality of the left humerus.  In the soft tissues over the volar aspect of the midshaft the left humerus there is suggestion of a hypodense focus measuring approximately 2 x 4 x 9 cm.  This could represent a lipoma or other soft tissue lesion and correlate with recently performed MRI from 7/28/24.   Dictated by (CST): Carlos Grier MD on 8/05/2024 at 11:11 AM     Finalized by (CST): Carlos Grier MD on 8/05/2024 at 11:13 AM             Lab Results   Component Value Date    WBC 4.4 04/27/2024    HGB 12.6 04/27/2024    .0 04/27/2024      Lab Results   Component Value Date    GLU 89 05/17/2024    BUN 17 05/17/2024    CREATSERUM 0.66 05/17/2024    GFRNAA 99 05/24/2022    GFRAA 114 05/24/2022        Assessment and Plan:  Diagnoses and all orders for this visit:    Mass of arm, left        Assessment: Lipoma versus liposarcoma left bicep region.    Plan: I answered further questions to her satisfaction.  She feels ready for the 10/17/2020 for surgery.  She also has some questions about the rehab which I do not anticipate to be that lengthy.    Follow Up: No follow-ups on file.    Arsalan Fisher MD

## 2024-09-10 ENCOUNTER — OFFICE VISIT (OUTPATIENT)
Dept: INTEGRATIVE MEDICINE | Facility: CLINIC | Age: 69
End: 2024-09-10
Payer: MEDICARE

## 2024-09-10 VITALS
WEIGHT: 122 LBS | HEIGHT: 62 IN | HEART RATE: 58 BPM | DIASTOLIC BLOOD PRESSURE: 68 MMHG | SYSTOLIC BLOOD PRESSURE: 118 MMHG | OXYGEN SATURATION: 97 % | BODY MASS INDEX: 22.45 KG/M2

## 2024-09-10 DIAGNOSIS — R19.5 LOOSE STOOLS: Primary | ICD-10-CM

## 2024-09-10 DIAGNOSIS — M81.0 OSTEOPOROSIS, UNSPECIFIED OSTEOPOROSIS TYPE, UNSPECIFIED PATHOLOGICAL FRACTURE PRESENCE: ICD-10-CM

## 2024-09-10 DIAGNOSIS — R79.89 ABNORMAL BUN-TO-CREATININE RATIO: ICD-10-CM

## 2024-09-10 DIAGNOSIS — R41.844 IMPAIRED EXECUTIVE FUNCTIONING: ICD-10-CM

## 2024-09-10 DIAGNOSIS — Z78.0 POSTMENOPAUSAL: ICD-10-CM

## 2024-09-10 PROCEDURE — 99215 OFFICE O/P EST HI 40 MIN: CPT | Performed by: PHYSICIAN ASSISTANT

## 2024-09-10 NOTE — PROGRESS NOTES
Matilde Connor is a 69 year old female.  Chief Complaint   Patient presents with    Follow - Up     Calcium        HPI:   69yr old female here to f/u.     Last labs were in May.   BUN/Creat was elevated.     Sees Endocrinologist for osteoporosis - switched from alendronate to Reclast injections.   Had DEXA scan 6/17/24    Saw GE Dr. MARTY Hi at [facility] for a follow-up on Friday.  On Pantoprazole for the lymphocytic esophagitis, but did have it cut down to every other day. Has very infrequent symptoms.    Reported she was Deficient in sucrase/maltase/lactase, but she was unable to obtain the Sucraid due to cost.    Next step is for her to do the breath test. Xifaxin is not covered.  Has frequent stool daily which is her biggest complaint.   Only slight bloating from time to time.     To have lipoma on the left bicep removed in Oct. Sees dr. Elaine next week for preop physical.     Did not get in with a therapist yet - part of the issue for her is the feeling of lack of time to complete everything that needs to be done.      ---------------------------------Last OV 3/19/24-----------------------------    Feeling overwhelmed and hard time focusing and accomplishing tasks. Gets distracted easily.  Has always had issues with sitting still, but always did ok in school.   Big life changes since COVID.      Dry cracked and bleeding skin on right knuckle today, as well as a scab on ankle. Not on any blood thinners.     GI -intermittent esophageal \"tightening\". Wine will sometimes cause symptoms.   Loose stool - 2-5BMs sometimes in the morning before even leaving the house. Bloating every once in awhile.   Saw GI specialist Dr. Luz Hi. Did EGD and found lymphocytic esophagitis and enzyme deficiency.  Prescription enzymes were not going to be covered by insurance so she did not get.  Saw allergist and she did not test positive for anything, but was advised to avoid wheat.  He recommended Pantoprazole and more  fiber. Using the psyllium husk.   On alendronates.     Lifestyle Factors affecting health:  Diet - Does not eat a lot sweets. Tries to eat more fiber and protein. Does not hydrate well.      Exercise - in PT for hamstring and labrum tear. Long brisk walks when she can.    Stress -significant life stressors in the last few years.    Sleep - not as much as she would like. Distracted instead of getting to bed.     REVIEW OF SYSTEMS:   Review of Systems   Constitutional: Negative.  Negative for chills, fatigue and fever.   Eyes: Negative.  Negative for visual disturbance.   Respiratory: Negative.  Negative for cough, shortness of breath and wheezing.    Cardiovascular: Negative.  Negative for chest pain.   Gastrointestinal:  Positive for diarrhea.   Endocrine: Negative.    Genitourinary: Negative.    Musculoskeletal: Negative.    Skin: Negative.    Allergic/Immunologic: Negative.    Neurological: Negative.  Negative for weakness and headaches.   Hematological: Negative.    Psychiatric/Behavioral: Negative.              FAMILY HISTORY:      Family History   Problem Relation Age of Onset    Cancer Father         Leukemia    Other (Other) Father     Heart Disorder Mother     Diabetes Mother     Other (Other) Mother     Cancer Maternal Grandmother         breast cancer    Heart Disorder Maternal Grandfather     Cancer Paternal Grandfather         stomach cancer    Other (Other) Paternal Grandfather     Cancer Sister 50        breast, uterine cancer       MEDICAL HISTORY:     Past Medical History:    Anemia    Osteoporosis       CURRENT MEDICATIONS:     Current Outpatient Medications   Medication Sig Dispense Refill    alendronate 70 MG Oral Tab Now is having yearly infusion      Calcium Carbonate 1250 (500 Ca) MG Oral Chew Tab 90 tablets (112,500 mg total).      pantoprazole 40 MG Oral Tab EC Take 1 tablet (40 mg total) by mouth.      Calcium 200 MG Oral Tab As Directed.      Cholecalciferol (VITAMIN D3) 2400 UNIT/ML Does  not apply Liquid Take 4,000 mL by mouth As Directed.      Turmeric (QC TUMERIC COMPLEX OR) Take by mouth.      OMEGA 3-6-9 FATTY ACIDS OR Take by mouth.      Zinc Sulfate (ZINC 15) 66 MG Oral Tab Take by mouth.      Ferrous Sulfate (IRON) 325 (65 Fe) MG Oral Tab       Magnesium 300 MG Oral Cap Take 1 capsule by mouth As Directed.      Estradiol 0.1 MG/GM Vaginal Cream Place 2 g vaginally.      Ascorbic Acid (VITAMIN C) 500 MG Oral Cap Take 1 Bottle by mouth As Directed. 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)     Social Determinants of Health      [facility]    Social Connections    [facility]    Housing Stability       SURGICAL HISTORY:     Past Surgical History:   Procedure Laterality Date          X4    Colonoscopy  2009    [facility]    D & c      [place] teeth removed         PHYSICAL EXAM:     Vitals:    09/10/24 1626   BP: 118/68   BP Location: Right arm   Patient Position: Sitting   Cuff Size: adult   Pulse: 58   SpO2: 97%   Weight: 122 lb (55.3 kg)   Height: 5' 2\" (1.575 m)       Physical Exam  Constitutional:       General: She is not in acute distress.     Appearance: Normal appearance. She is not ill-appearing or toxic-appearing.   HENT:      Head: Normocephalic and atraumatic.   Eyes:       Extraocular Movements: Extraocular movements intact.   Pulmonary:      Effort: Pulmonary effort is normal. No respiratory distress.   Musculoskeletal:      Cervical back: Normal range of motion.   Skin:     Coloration: Skin is not jaundiced.      Findings: No lesion.   Neurological:      Mental Status: She is alert and oriented to person, place, and time.   Psychiatric:         Mood and Affect: Mood normal.         Behavior: Behavior normal.         Thought Content: Thought content normal.         Judgment: Judgment normal.          ASSESSMENT AND PLAN:     [facility] Lab Encounter on 05/17/2024   Component Date Value Ref Range Status    Glucose 05/17/2024 89  70 - 99 mg/dL Final    Sodium 05/17/2024 142  136 - 145 mmol/L Final    Potassium 05/17/2024 3.8  3.5 - 5.1 mmol/L Final    Chloride 05/17/2024 106  98 - 112 mmol/L Final    CO2 05/17/2024 30.0  21.0 - 32.0 mmol/L Final    Anion Gap 05/17/2024 6  0 - 18 mmol/L Final    BUN 05/17/2024 17  9 - 23 mg/dL Final    Creatinine 05/17/2024 0.66  0.55 - 1.02 mg/dL Final    BUN/CREA Ratio 05/17/2024 25.8 (H)  10.0 - 20.0 Final    Calcium, Total 05/17/2024 9.1  8.7 - 10.4 mg/dL Final    Calculated Osmolality 05/17/2024 295  275 - 295 mOsm/kg Final    eGFR-Cr 05/17/2024 96  >=60 mL/min/1.73m2 Final    ALT 05/17/2024 16  10 - 49 U/L Final    AST 05/17/2024 20  <=34 U/L Final    Alkaline Phosphatase 05/17/2024 31 (L)  55 - 142 U/L Final    Bilirubin, Total 05/17/2024 1.1  0.2 - 1.1 mg/dL Final    Total Protein 05/17/2024 7.0  5.7 - 8.2 g/dL Final    Albumin 05/17/2024 4.5  3.2 - 4.8 g/dL Final    Globulin  05/17/2024 2.5  2.0 - 3.5 g/dL Final    A/G Ratio 05/17/2024 1.8  1.0 - 2.0 Final    Patient Fasting for CMP? 05/17/2024 Yes   Final    HgbA1C 05/17/2024 5.4  <5.7 % Final     Normal HbA1C:     <5.7%      Pre-Diabetic:     5.7 - 6.4%      Diabetic:         >6.4%      Diabetic Control: <7.0%        Estimated Average Glucose 05/17/2024 108  68 - 126 mg/dL Final    eAG is the  estimated average glucose calculated from Hgb A1c according to the formula recommended by the American Diabetes Association. eAG levels reflect the long term average glucose and may not correlate with random or fasting glucose levels since these represent specific points in time.           Cholesterol, Total 05/17/2024 217 (H)  <200 mg/dL Final    Desirable  <200 mg/dL  Borderline  200-239 mg/dL  High      >=240 mg/dL        HDL Cholesterol 05/17/2024 89 (H)  40 - 59 mg/dL Final    Interpretive Information:   An HDL cholesterol <40 mg/dL is low and constitutes a coronary heart disease risk factor. An HDL cholesterol >60 mg/dL is a negative risk factor for coronary heart disease.        Triglycerides 05/17/2024 47  30 - 149 mg/dL Final    Reference interval for fasting triglycerides  Desirable: <150 mg/dL  Borderline: 150-199 mg/dL  High: 200-499 mg/dL  Very High: >=500 mg/dL          LDL Cholesterol 05/17/2024 120 (H)  <100 mg/dL Final    Optimal            <100 mg/dL   Near/Above OptimaL 100-129 mg/dL   Borderline High    130-159 mg/dL   High               160-189 mg/dL    Very High          >190 mg/dL         VLDL 05/17/2024 8  0 - 30 mg/dL Final    Non HDL Chol 05/17/2024 128  <130 mg/dL Final    Desirable  <130 mg/dL   Borderline  130-159 mg/dL   High        160-189 mg/dL       Very high >=190 mg/dL        Patient Fasting for Lipid? 05/17/2024 Yes   Final    TSH 05/17/2024 1.447  0.550 - 4.780 mIU/mL Final    Vitamin D, 25OH, Total 05/17/2024 39.0  30.0 - 100.0 ng/mL Final    Literature Recommendations for 25(OH)D levels are:  Range           Vitamin D Status   <20    ng/mL      Deficiency   20-<30 ng/mL      Insufficiency    ng/mL      Sufficiency   >100   ng/mL      Toxicity    *Clinical controversy exists regarding optimal 25(OH)D levels. Emerging evidence links potential adverse effects to high levels, particularly >60 ng/mL.       [facility] Lab Encounter on 04/27/2024   Component Date Value Ref Range Status     Iron 04/27/2024 59  50 - 170 ug/dL Final    Transferrin 04/27/2024 255  250 - 380 mg/dL Final    Total Iron Binding Capacity 04/27/2024 380  250 - 425 ug/dL Final    % Saturation 04/27/2024 16  15 - 50 % Final    Ferritin 04/27/2024 22.9  18.0 - 340.0 ng/mL Final    WBC 04/27/2024 4.4  4.0 - 11.0 x10(3) uL Final    RBC 04/27/2024 4.02  3.80 - 5.30 x10(6)uL Final    HGB 04/27/2024 12.6  12.0 - 16.0 g/dL Final    HCT 04/27/2024 37.8  35.0 - 48.0 % Final    MCV 04/27/2024 94.0  80.0 - 100.0 fL Final    MCH 04/27/2024 31.3  26.0 - 34.0 pg Final    MCHC 04/27/2024 33.3  31.0 - 37.0 g/dL Final    RDW-SD 04/27/2024 42.9  35.1 - 46.3 fL Final    RDW 04/27/2024 12.3  11.0 - 15.0 % Final    PLT 04/27/2024 246.0  150.0 - 450.0 10(3)uL Final    Neutrophil Absolute Prelim 04/27/2024 2.17  1.50 - 7.70 x10 (3) uL Final    Neutrophil Absolute 04/27/2024 2.17  1.50 - 7.70 x10(3) uL Final    Lymphocyte Absolute 04/27/2024 1.63  1.00 - 4.00 x10(3) uL Final    Monocyte Absolute 04/27/2024 0.44  0.10 - 1.00 x10(3) uL Final    Eosinophil Absolute 04/27/2024 0.05  0.00 - 0.70 x10(3) uL Final    Basophil Absolute 04/27/2024 0.06  0.00 - 0.20 x10(3) uL Final    Immature Granulocyte Absolute 04/27/2024 0.01  0.00 - 1.00 x10(3) uL Final    Neutrophil % 04/27/2024 49.8  % Final    Lymphocyte % 04/27/2024 37.4  % Final    Monocyte % 04/27/2024 10.1  % Final    Eosinophil % 04/27/2024 1.1  % Final    Basophil % 04/27/2024 1.4  % Final    Immature Granulocyte % 04/27/2024 0.2  % Final      XR HUMERUS (MIN 2 VIEWS), LEFT (CPT=73060)    Result Date: 8/5/2024  CONCLUSION: No acute osseous abnormality of the left humerus.  In the soft tissues over the volar aspect of the midshaft the left humerus there is suggestion of a hypodense focus measuring approximately 2 x 4 x 9 cm.  This could represent a lipoma or other soft tissue lesion and correlate with recently performed MRI from 7/28/24.   Dictated by (CST): Carlos Grier MD on 8/05/2024 at 11:11 AM      Finalized by (CST): Carlos Grier MD on 8/05/2024 at 11:13 AM          MRI UPPER ARM (W+WO), LEFT (CPT=73220)    Result Date: 7/29/2024  CONCLUSION:   12.6 cm intramuscular fat intensity mass within the short head biceps muscle.  Differentials include a simple lipoma versus an atypical lipomatous tumor.  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 Given the slow interval increase in size since 9/8/2022, non emergent orthopedic oncologic evaluation is suggested.  If tissue sampling and histologic analysis is considered, MDM2 gene amplification should be performed.  Mild tendinosis of the supraspinatus and infraspinatus.  no full-thickness rotator cuff tear.  Subacromial subdeltoid bursitis.  Mild long head biceps tenosynovitis.      Dictated by (CST): Cliff Ge MD on 7/29/2024 at 4:30 PM     Finalized by (CST): Cliff Ge MD on 7/29/2024 at 4:35 PM          XR DEXA BONE DENSITOMETRY (CPT=77080)    Result Date: 7/16/2024  CONCLUSION:  1. Scans of the lumbar spine and left hip are consistent with osteoporosis, which according to World Health Organization criteria place the patient at a severely increased risk for fracture.  2. Compared to the prior study, bone mineral density has increased in the lumbar spine and decreased in the left hip.     Dictated by (CST): Moris Rosario MD on 7/16/2024 at 9:41 AM     Finalized by (CST): Moris Rosario MD on 7/16/2024 at 9:42 AM           1. Loose stools    2. Osteoporosis, unspecified osteoporosis type, unspecified pathological fracture presence    3. Impaired executive functioning    4. Postmenopausal    5. Abnormal BUN-to-creatinine ratio        Time spent with patient: Over 50 minutes spent in chart review and in direct communication with patient obtaining and reviewing history, creating a unique care plan, explaining the rationale for treatment, reviewing potential SE and overall treatment plan,   documenting all clinical information in Epic. Over 50% of this time was in education, counseling and coordination of care.     Problem List Items Addressed This Visit          Endocrine and Metabolic    Osteoporosis       Genitourinary and Reproductive    Postmenopausal     Other Visit Diagnoses       Loose stools    -  Primary    Impaired executive functioning        Abnormal BUN-to-creatinine ratio                 Cognitive-executive functioning skills due to feelings of overwhelm.  There may be some form of ADHD at play, discussed how neuropsych testing could be done to evaluate this.   -> N did contact patient but she is not able to pursue this further at this time.    Elevated BUN/creatinine -recommend increasing hydration.  Most likely will be rechecked in [facility] for preop physical next week.    GI -seeing GE specialist for insufficient digestive enzymes, lymphocytic esophagitis, and now suspected SIBO (see download for summary from Dr. Hi).  Still with intermittent esophageal tightness and intermittent frequent loose stools.    She did not try the digestive enzyme recommended last time, and these Sucraid that was recommended by the GE was not covered by insurance.  She is doing well increasing fiber intake.    She plans to take the SIBO breath test soon and will be following up with GE.    Osteoporosis -patient received Reclast a few months back but reports difficulty getting messages or follow-up with endocrinology at [facility].  Most recent DEXA scan showed improvements in bone density in L-spine, but a decrease in density in the left femur.  -> Recommend additional evaluation and treatment recommendations with osteo Smart Dr. Jimenez.    Given further recommendations as below    Orders Placed This Visit:  No orders of the defined types were placed in this encounter.    No orders of the defined types were placed in this encounter.      There are no Patient Instructions on file for this visit.    Return in about  9 months (around 6/10/2025) for x60min: osteoporosis, GI dysfunction.    Patient affirmed understanding of plan and all questions were answered.     Shanon Cruz PA-C

## 2024-09-17 ENCOUNTER — OFFICE VISIT (OUTPATIENT)
Dept: FAMILY MEDICINE CLINIC | Facility: CLINIC | Age: 69
End: 2024-09-17
Payer: MEDICARE

## 2024-09-17 ENCOUNTER — TELEPHONE (OUTPATIENT)
Dept: FAMILY MEDICINE CLINIC | Facility: CLINIC | Age: 69
End: 2024-09-17

## 2024-09-17 VITALS
HEART RATE: 77 BPM | TEMPERATURE: 99 F | OXYGEN SATURATION: 98 % | WEIGHT: 119 LBS | SYSTOLIC BLOOD PRESSURE: 98 MMHG | BODY MASS INDEX: 22 KG/M2 | DIASTOLIC BLOOD PRESSURE: 54 MMHG

## 2024-09-17 DIAGNOSIS — Z01.818 PREOP EXAMINATION: ICD-10-CM

## 2024-09-17 DIAGNOSIS — M81.0 AGE-RELATED OSTEOPOROSIS WITHOUT CURRENT PATHOLOGICAL FRACTURE: ICD-10-CM

## 2024-09-17 DIAGNOSIS — R22.32 MASS OF ARM, LEFT: Primary | ICD-10-CM

## 2024-09-17 LAB
ATRIAL RATE: 64 BPM
P AXIS: 75 DEGREES
P-R INTERVAL: 196 MS
Q-T INTERVAL: 420 MS
QRS DURATION: 86 MS
QTC CALCULATION (BEZET): 433 MS
R AXIS: 39 DEGREES
T AXIS: 61 DEGREES
VENTRICULAR RATE: 64 BPM

## 2024-09-17 PROCEDURE — 93000 ELECTROCARDIOGRAM COMPLETE: CPT | Performed by: STUDENT IN AN ORGANIZED HEALTH CARE EDUCATION/TRAINING PROGRAM

## 2024-09-17 PROCEDURE — 99214 OFFICE O/P EST MOD 30 MIN: CPT | Performed by: STUDENT IN AN ORGANIZED HEALTH CARE EDUCATION/TRAINING PROGRAM

## 2024-09-17 NOTE — TELEPHONE ENCOUNTER
RN spoke with [facility] regarding recent stress test report.         Stress test report confirmed to be from August of 2024- however, date of the report says August 2023.      at [facility] says they will call back and see if they can adjust the date.

## 2024-09-17 NOTE — PROGRESS NOTES
Subjective:   Matilde Connor is a 69 year old female who presents for Pre-Op Exam (Lyphoma Left arm removal on 10/17/24)     69-year-old female coming in for a preop examination.  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  She is scheduled for an excisional biopsy mass left upper arm with Dr. Fisher on 10/17/2024.  Had a stress echo in August 2024 that showed no electrocardiographic evidence of ischemia and no echocardiographic evidence of ischemia.  Has been under anesthesia in the past with no complications.  Denies history of MI, CHF, stroke, TIA, insulin use, kidney disease, DVT, PE.    History/Other:    Chief Complaint Reviewed and Verified  Nursing Notes Reviewed and   Verified  Tobacco Reviewed  Allergies Reviewed  Medications Reviewed    Problem List Reviewed  Medical History Reviewed  Surgical History   Reviewed  OB Status Reviewed  Family History Reviewed         Tobacco:  She has never smoked tobacco.    Current Outpatient Medications   Medication Sig Dispense Refill    Calcium Carbonate 1250 (500 Ca) MG Oral Chew Tab 90 tablets (112,500 mg total).      pantoprazole 40 MG Oral Tab EC Take 1 tablet (40 mg total) by mouth.      Calcium 200 MG Oral Tab As Directed.      Cholecalciferol (VITAMIN D3) 2400 UNIT/ML Does not apply Liquid Take 4,000 mL by mouth As Directed.      Turmeric (QC TUMERIC COMPLEX OR) Take by mouth.      OMEGA 3-6-9 FATTY ACIDS OR Take by mouth.      Zinc Sulfate (ZINC 15) 66 MG Oral Tab Take by mouth.      Ascorbic Acid (VITAMIN C) 500 MG Oral Cap Take 1 Bottle by mouth As Directed.      Ferrous Sulfate (IRON) 325 (65 Fe) MG Oral Tab       Magnesium 300 MG Oral Cap Take 1 capsule by mouth As Directed.      Estradiol 0.1 MG/GM Vaginal Cream Place 2 g vaginally.      rifAXIMin (XIFAXAN) 550 MG Oral Tab 42 tablets (23,100 mg total). (Patient not taking: Reported on 9/17/2024)      alendronate 70 MG Oral Tab Now is having yearly infusion (Patient not  taking: Reported on 9/17/2024)           Review of Systems:  Review of Systems   Constitutional:  Negative for chills, diaphoresis and fever.   HENT:  Negative for congestion, ear discharge, ear pain, sinus pressure, sinus pain and sore throat.    Eyes:  Negative for pain and discharge.   Respiratory:  Negative for cough, chest tightness, shortness of breath and wheezing.    Cardiovascular:  Negative for chest pain and palpitations.   Gastrointestinal:  Negative for abdominal pain, diarrhea, nausea and vomiting.   Endocrine: Negative for cold intolerance and heat intolerance.   Genitourinary:  Negative for dysuria, flank pain, frequency and urgency.   Musculoskeletal:  Negative for joint swelling.        Lump in left bicep region.   Skin:  Negative for rash.   Neurological:  Negative for dizziness, syncope and headaches.   Psychiatric/Behavioral:  Negative for confusion and hallucinations.        Objective:   BP 98/54 (BP Location: Right arm, Patient Position: Sitting, Cuff Size: adult)   Pulse 77   Temp 99.1 °F (37.3 °C) (Oral)   Wt 119 lb (54 kg)   LMP  (LMP Unknown)   SpO2 98%   BMI 21.77 kg/m²  Estimated body mass index is 21.77 kg/m² as calculated from the following:    Height as of 9/10/24: 5' 2\" (1.575 m).    Weight as of this encounter: 119 lb (54 kg).  Physical Exam  Constitutional:       General: She is not in acute distress.     Appearance: Normal appearance. She is not ill-appearing or toxic-appearing.   HENT:      Head: Normocephalic and atraumatic.   Cardiovascular:      Rate and Rhythm: Normal rate and regular rhythm.      Heart sounds: Normal heart sounds. No murmur heard.     No gallop.   Pulmonary:      Effort: Pulmonary effort is normal. No respiratory distress.      Breath sounds: Normal breath sounds. No stridor. No wheezing, rhonchi or rales.   Abdominal:      General: Bowel sounds are normal.      Palpations: Abdomen is soft.      Tenderness: There is no abdominal tenderness. There is no  guarding.   Musculoskeletal:         General: No swelling.      Cervical back: Normal range of motion and neck supple. No rigidity or tenderness.      Comments: Lump in left bicep region.   Skin:     General: Skin is warm and dry.   Neurological:      General: No focal deficit present.      Mental Status: She is alert and oriented to person, place, and time. Mental status is at baseline.   Psychiatric:         Mood and Affect: Mood normal.         Behavior: Behavior normal.         Thought Content: Thought content normal.         Judgment: Judgment normal.         Assessment & Plan:     1. Mass of arm, left (Primary)  Excisional biopsy mass left upper arm with Dr. Fisher on 10/17/2024.  2. Preop examination  EKG in office NSR at 64 bpm.  Revised Cardiac Risk Index for Pre-Operative Risk: Class I Risk.  Patient is medically optimized and is an acceptable candidate for the above-mentioned surgery pending lab results.  States was cleared by cardiology and clearance was sent to Dr. Fisher's office.  RN discussed with cardio office that stress echo was completed in August 2024.   -     ELECTROCARDIOGRAM, COMPLETE  -     CBC, Platelet; No Differential; Future; Expected date: 09/17/2024  -     Comp Metabolic Panel (14); Future; Expected date: 09/17/2024  3. Age-related osteoporosis without current pathological fracture  Follows up with endocrinologist at [place].  Would like to consider establishing care with one of our providers.  -     ENDOCRINOLOGY - INTERNAL        Return for Routine care.    Rustam Elaine MD, 9/17/2024, 9:43 AM           Time spent: 35 minutes, obtaining history, evaluating patient, discussing differential diagnosis, recommendations, diagnostic testing options, treatment options, risks and benefits of my recommendations, counseling patient, discussing prognosis/expectations, and follow up with patient as well as completing documentation.

## 2024-10-06 ENCOUNTER — LAB ENCOUNTER (OUTPATIENT)
Dept: LAB | Facility: HOSPITAL | Age: 69
End: 2024-10-06
Attending: STUDENT IN AN ORGANIZED HEALTH CARE EDUCATION/TRAINING PROGRAM
Payer: MEDICARE

## 2024-10-06 DIAGNOSIS — Z01.818 PREOP EXAMINATION: ICD-10-CM

## 2024-10-06 LAB
ALBUMIN SERPL-MCNC: 4.8 G/DL (ref 3.2–4.8)
ALBUMIN/GLOB SERPL: 1.8 {RATIO} (ref 1–2)
ALP LIVER SERPL-CCNC: 38 U/L
ALT SERPL-CCNC: 18 U/L
ANION GAP SERPL CALC-SCNC: 5 MMOL/L (ref 0–18)
AST SERPL-CCNC: 23 U/L (ref ?–34)
BILIRUB SERPL-MCNC: 1.1 MG/DL (ref 0.2–1.1)
BUN BLD-MCNC: 14 MG/DL (ref 9–23)
BUN/CREAT SERPL: 21.9 (ref 10–20)
CALCIUM BLD-MCNC: 9.9 MG/DL (ref 8.7–10.4)
CHLORIDE SERPL-SCNC: 107 MMOL/L (ref 98–112)
CO2 SERPL-SCNC: 30 MMOL/L (ref 21–32)
CREAT BLD-MCNC: 0.64 MG/DL
DEPRECATED RDW RBC AUTO: 43.6 FL (ref 35.1–46.3)
EGFRCR SERPLBLD CKD-EPI 2021: 97 ML/MIN/1.73M2 (ref 60–?)
ERYTHROCYTE [DISTWIDTH] IN BLOOD BY AUTOMATED COUNT: 12.3 % (ref 11–15)
FASTING STATUS PATIENT QL REPORTED: YES
GLOBULIN PLAS-MCNC: 2.6 G/DL (ref 2–3.5)
GLUCOSE BLD-MCNC: 89 MG/DL (ref 70–99)
HCT VFR BLD AUTO: 38.4 %
HGB BLD-MCNC: 12.7 G/DL
MCH RBC QN AUTO: 31.5 PG (ref 26–34)
MCHC RBC AUTO-ENTMCNC: 33.1 G/DL (ref 31–37)
MCV RBC AUTO: 95.3 FL
OSMOLALITY SERPL CALC.SUM OF ELEC: 294 MOSM/KG (ref 275–295)
PLATELET # BLD AUTO: 248 10(3)UL (ref 150–450)
POTASSIUM SERPL-SCNC: 3.9 MMOL/L (ref 3.5–5.1)
PROT SERPL-MCNC: 7.4 G/DL (ref 5.7–8.2)
RBC # BLD AUTO: 4.03 X10(6)UL
SODIUM SERPL-SCNC: 142 MMOL/L (ref 136–145)
WBC # BLD AUTO: 4.5 X10(3) UL (ref 4–11)

## 2024-10-06 PROCEDURE — 80053 COMPREHEN METABOLIC PANEL: CPT

## 2024-10-06 PROCEDURE — 85027 COMPLETE CBC AUTOMATED: CPT

## 2024-10-06 PROCEDURE — 36415 COLL VENOUS BLD VENIPUNCTURE: CPT

## 2024-10-09 ENCOUNTER — TELEPHONE (OUTPATIENT)
Dept: ORTHOPEDICS CLINIC | Facility: CLINIC | Age: 69
End: 2024-10-09

## 2024-10-09 ENCOUNTER — TELEPHONE (OUTPATIENT)
Dept: FAMILY MEDICINE CLINIC | Facility: CLINIC | Age: 69
End: 2024-10-09

## 2024-10-09 NOTE — TELEPHONE ENCOUNTER
----- Message from Rustam Elaine sent at 10/9/2024 10:13 AM CDT -----  Regarding: Preop  We can send preop paperwork, EKG and labs to surgical office.  Per patient states was cleared by cardiology and clearance was sent to Dr. Fisher's office.

## 2024-10-14 NOTE — H&P
Arsalan Fisher MD   Physician  Specialty: SURGERY, ORTHOPEDIC     H&P     Signed       Signed       Expand All Collapse All    NURSING INTAKE COMMENTS:        Chief Complaint   Patient presents with    Consult       L bicep - Pt states lipoma  in left bicep. Pt states she feels pulling and tension in bicep.  Has MRI in system.         HPI: This 69 year old right-hand-dominant female presents today with growing mass left bicep.  She had MRI several years ago which showed the mass.  Recent MRI shows it has grown.  She also subjectively feels it has grown.  There is no pain except if she extends her arm behind her she might have a little pulling.  Occasionally she gets a little tingling to the left fourth fingertip but aside for that has no neurologic complaint.  She denies unexplained weight loss, fever, or chills.  MRI shows it is a lipoma.     She lives independently with her .  The kids are all out of the house.  She works from a desk mostly from office.  She walks or bikes occasionally.  She is in good shape physically.  Orthopedically she has a labral tear of her hip treated with PRP injection and physical therapy successfully so far.  She also had a partial tear of the hamstring on the left thigh which is also improving.     Past Medical History       Past Medical History:    Anemia    Osteoporosis         Past Surgical History         Past Surgical History:   Procedure Laterality Date             X4    Colonoscopy   2009     [facility]    D & c        [place] teeth removed             Current Medications          Current Outpatient Medications   Medication Sig Dispense Refill    alendronate 70 MG Oral Tab tablet        Calcium Carbonate 1250 (500 Ca) MG Oral Chew Tab 90 tablets (112,500 mg total).        pantoprazole 40 MG Oral Tab EC Take 1 tablet (40 mg total) by mouth.        Calcium 200 MG Oral Tab As Directed.        Cholecalciferol (VITAMIN D3) 2400 UNIT/ML Does not apply Liquid  Take 4,000 mL by mouth As Directed.        Turmeric (QC TUMERIC COMPLEX OR) Take by mouth.        OMEGA 3-6-9 FATTY ACIDS OR Take by mouth.        Zinc Sulfate (ZINC 15) 66 MG Oral Tab Take by mouth.        Ascorbic Acid (VITAMIN C) 500 MG Oral Cap Take 1 Bottle by mouth As Directed.        Ferrous Sulfate (IRON) 325 (65 Fe) MG Oral Tab          Magnesium 300 MG Oral Cap Take 1 capsule by mouth As Directed.        Estradiol 0.1 MG/GM Vaginal Cream Place 2 g vaginally.             Allergies         Allergies   Allergen Reactions    Miconazole ITCHING       Burning and itching          Family History         Family History   Problem Relation Age of Onset    Cancer Father           Leukemia    Other (Other) Father      Heart Disorder Mother      Diabetes Mother      Other (Other) Mother      Cancer Maternal Grandmother           breast cancer    Heart Disorder Maternal Grandfather      Cancer Paternal Grandfather           stomach cancer    Other (Other) Paternal Grandfather      Cancer Sister 50         breast, uterine cancer         No family Hx of DVT/PE     Social History            Occupational History    Not on file   Tobacco Use    Smoking status: Never    Smokeless tobacco: Never   Vaping Use    Vaping status: Never Used   Substance and Sexual Activity    Alcohol use: Yes       Comment: occ    Drug use: No    Sexual activity: Not on file         Review of Systems:  GENERAL: feels generally well, no significant weight loss or weight gain  SKIN: no ulcerated or worrisome skin lesions  EYES:denies blurred vision or double vision  HEENT: denies new nasal congestion, sinus pain or ST  LUNGS: denies shortness of breath  CARDIOVASCULAR: denies chest pain  GI: no hematemesis, no worsening heartburn, no diarrhea  : no dysuria, no blood in urine, no difficulty urinating, no incontinence  MUSCULOSKELETAL: no other musculoskeletal complaints other than in HPI  NEURO: no numbness or tingling, no weakness or balance  disorder  PSYCHE: no depression or anxiety  HEMATOLOGIC: no hx of blood dyscrasia, no Hx DVT/PE  ENDOCRINE: no thyroid or diabetes issues  ALL/ASTHMA: no new hx of severe allergy or asthma     Physical Examination:    LMP  (LMP Unknown)   Constitutional: appears well hydrated, alert and responsive, no acute distress noted  Extremities: The left shoulder and left elbow are benign with full range of motion and no tenderness.  No weakness.  Musculoskeletal: The mass is not readily visible at rest bowel or if she tries to make a muscle, it is clearly asymmetric compared to the right arm.  No tenderness or puckering the skin.  It feels mobile.  Neurological: Normal motor and sensory left upper extremity today.     Imaging: MRI as below.  I agree with the radiologist.  When compared to the prior MRI 2 years ago, it has grown and the patient subjectively feels the same.  I did not see any significant edema in the muscle to suggest malignancy.        MRI UPPER ARM (W+WO), LEFT (CPT=73220)     Result Date: 7/29/2024  PROCEDURE:         MRI UPPER ARM (W+WO), LEFT (CPT=73220)  COMPARISON:       None.  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  Images were performed without and with intravenous gadolinium contrast.   FINDINGS:       BONES:        Mild degenerative change at the acromioclavicular joint. No acute fracture, dislocation, or marrow replacing lesion. SOFT TISSUES:   There is a 4 x 2 x 12.6 cm T1 hyperintense, well encapsulated lesion which suppresses signal on the inversion recovery and fat-suppressed sequences within the short head of the biceps muscle which corresponds to patient's palpable abnormality.  No enhancement.  There are thin nonenhancing septations within the mass which are unchanged.  This has slightly increased in size since 9/8/2022 when it measured approximately 9 cm in maximum  dimensions.            There is thickening with increased signal seen within the supraspinatus and infraspinatus tendons.  No full-thickness rotator cuff tear. Remainder of the muscles otherwise have a normal signal intensity and bulk. There is thickening with increased fluid  in the subacromial subdeltoid bursa. Focal amount of increased fluid is seen within the bicipital tendon sheath, out of proportion to what is seen within the glenohumeral joint consistent with tenosynovitis.   EFFUSION:    None visible.  OTHER:      Negative.           CONCLUSION:          12.6 cm intramuscular fat intensity mass within the short head biceps muscle.  Differentials include a simple lipoma versus an atypical lipomatous tumor.  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 Given the slow interval increase in size since 9/8/2022, non emergent orthopedic oncologic evaluation is suggested.  If tissue sampling and histologic analysis is considered, MDM2 gene amplification should be performed.  Mild tendinosis of the supraspinatus and infraspinatus.  no full-thickness rotator cuff tear.  Subacromial subdeltoid bursitis.  Mild long head biceps tenosynovitis.      Dictated by (CST): Cliff Ge MD on 7/29/2024 at 4:30 PM     Finalized by (CST): Cliff Ge MD on 7/29/2024 at 4:35 PM           XR DEXA BONE DENSITOMETRY (CPT=77080)     Result Date: 7/16/2024  PROCEDURE:         XR DEXA BONE DENSITOMETRY (CPT=77080)  COMPARISON:       [facility], XR DEXA BONE DENSITOMETRY (CPT=77080), 7/06/2022, 8:03 AM.  INDICATIONS:       Age-related osteoporosis without current pathological fracture  TECHNIQUE:     Measurement of bone mineral density of the lumbar spine and hip was performed on a [employer] dual energy x-ray absorptiometry scanner.  FINDINGS:             LEFT FEMORAL NECK    BMD:  0.550 gm/sq. cm.      T SCORE:     -2.7     Z SCORE:        -1.0  Change:  Bone  mineral density has decreased by 3.2% compared to 07/06/2022.  LEFT TOTAL HIP     BMD:  0.656 gm/sq. cm.      T SCORE:        -2.3     Z SCORE:     -1.0  Change:  Bone mineral density has decreased by 6.9% compared to 07/06/2022, which is statistically significant.  PA LUMBAR SPINE (L1 - L4)  BMD:            0.839 gm/sq. cm.      T SCORE:     -1.9     Z SCORE:     0.0  Change:  Bone mineral density has increased by 8.7% compared to 07/06/2022, which is statistically significant.         T scores are a comparison to sex-matched patients with mean peak bone mass and are given in standard deviation (s.d.).  Each 1 s.d. corresponds to approximately 10% below peak normal bone density.   WORLD HEALTH ORGANIZATION CRITERIA NORMAL T SCORE:           Above -1 s.d.  OSTEOPENIA T SCORE:            Between -1 and -2.5 s.d.  OSTEOPOROSIS T SCORE:       -2.5 s.d.  National Osteoporosis Foundation Clinician's Guide to Prevention and Treatment of Osteoporosis recommendations for treatment: Post menopausal women and men age 50 and older presenting with the following should be considered for treatment: * A hip or vertebral (clinical or morphometric) fracture * T score < -2.5 at the femoral neck or spine after appropriate evaluation to exclude secondary causes. * Low bone mass (T score between -1.0 and -2.5 at the femoral neck or spine) and a 10-year probability of a hip fracture > 3% or a 10-year probability of a major osteoporosis-related fracture > 20% based on the [place]-adapted WHO algorithm          CONCLUSION:         1. Scans of the lumbar spine and left hip are consistent with osteoporosis, which according to World Health Organization criteria place the patient at a severely increased risk for fracture.  2. Compared to the prior study, bone mineral density has increased in the lumbar spine and decreased in the left hip.     Dictated by (CST): Moris Rosario MD on 7/16/2024 at 9:41 AM     Finalized by (CST): Moris Rosario,  MD on 7/16/2024 at 9:42 AM                    Lab Results   Component Value Date     WBC 4.4 04/27/2024     HGB 12.6 04/27/2024     .0 04/27/2024            Lab Results   Component Value Date     GLU 89 05/17/2024     BUN 17 05/17/2024     CREATSERUM 0.66 05/17/2024     GFRNAA 99 05/24/2022     GFRAA 114 05/24/2022         Assessment and Plan:  Diagnoses and all orders for this visit:     Mass of arm, left     Pain  -     XR HUMERUS (MIN 2 VIEWS), LEFT (CPT=73060); Future           Assessment: Left bicep lipoma increasing in size over several years.     Plan: I discussed with her that I recommended surgical excision for biopsy.  Rationale is that it is growing.  I discussed there is a 5% chance this could be liposarcoma but 95% chance that it is benign.  We discussed potential risks of surgery such as death, heart attack, stroke, bleeding, infection, blood clot, nerve injury, artery injury, unsightly scar, and the potential need for further surgery.  After discussion, she wished to have it excised.  While she has no red flags I think medical clearances in order.  The plan will be excisional biopsy left bicep lipoma.  Will try to do it under interscalene block and MAC.     Follow Up: No follow-ups on file.     Arsalan Fisher MD

## 2024-10-15 ENCOUNTER — TELEPHONE (OUTPATIENT)
Dept: ORTHOPEDICS CLINIC | Facility: CLINIC | Age: 69
End: 2024-10-15

## 2024-10-15 NOTE — TELEPHONE ENCOUNTER
S/w patient- She is scheduled for excisional biopsy in 10/17/24. She is wondering if there is anything special she needs to do to prepare for surgery. I told her that from out standpoint, she is good to go for surgery because she has obtained medical clearance. I informed her that she is going to get a call from [facility] tomorrow with any \"day before\" instructions for surgery and surgical time. She was agreeable to plan and had no other questions at this time

## 2024-10-15 NOTE — TELEPHONE ENCOUNTER
Patient calling to speak with nurse regarding questions prior to surgery on 10/17. Please call at [phone], thanks.

## 2024-10-17 ENCOUNTER — HOSPITAL ENCOUNTER (OUTPATIENT)
Facility: HOSPITAL | Age: 69
Setting detail: HOSPITAL OUTPATIENT SURGERY
Discharge: HOME | End: 2024-10-17
Attending: ORTHOPAEDIC SURGERY | Admitting: ORTHOPAEDIC SURGERY
Payer: MEDICARE

## 2024-10-17 ENCOUNTER — ANESTHESIA (OUTPATIENT)
Dept: SURGERY | Facility: HOSPITAL | Age: 69
End: 2024-10-17
Payer: MEDICARE

## 2024-10-17 ENCOUNTER — ANESTHESIA EVENT (OUTPATIENT)
Dept: SURGERY | Facility: HOSPITAL | Age: 69
End: 2024-10-17
Payer: MEDICARE

## 2024-10-17 VITALS
WEIGHT: 117 LBS | DIASTOLIC BLOOD PRESSURE: 61 MMHG | BODY MASS INDEX: 21.53 KG/M2 | RESPIRATION RATE: 14 BRPM | OXYGEN SATURATION: 97 % | SYSTOLIC BLOOD PRESSURE: 121 MMHG | HEIGHT: 62 IN | TEMPERATURE: 98 F | HEART RATE: 61 BPM

## 2024-10-17 DIAGNOSIS — R22.32 MASS OF ARM, LEFT: Primary | ICD-10-CM

## 2024-10-17 PROCEDURE — 88304 TISSUE EXAM BY PATHOLOGIST: CPT | Performed by: ORTHOPAEDIC SURGERY

## 2024-10-17 PROCEDURE — 88271 CYTOGENETICS DNA PROBE: CPT | Performed by: STUDENT IN AN ORGANIZED HEALTH CARE EDUCATION/TRAINING PROGRAM

## 2024-10-17 PROCEDURE — 88291 CYTO/MOLECULAR REPORT: CPT | Performed by: STUDENT IN AN ORGANIZED HEALTH CARE EDUCATION/TRAINING PROGRAM

## 2024-10-17 PROCEDURE — 0JBF0ZZ EXCISION OF LEFT UPPER ARM SUBCUTANEOUS TISSUE AND FASCIA, OPEN APPROACH: ICD-10-PCS | Performed by: ORTHOPAEDIC SURGERY

## 2024-10-17 RX ORDER — HYDROMORPHONE HYDROCHLORIDE 1 MG/ML
0.6 INJECTION, SOLUTION INTRAMUSCULAR; INTRAVENOUS; SUBCUTANEOUS EVERY 5 MIN PRN
Status: DISCONTINUED | OUTPATIENT
Start: 2024-10-17 | End: 2024-10-18

## 2024-10-17 RX ORDER — MIDAZOLAM HYDROCHLORIDE 1 MG/ML
INJECTION INTRAMUSCULAR; INTRAVENOUS AS NEEDED
Status: DISCONTINUED | OUTPATIENT
Start: 2024-10-17 | End: 2024-10-17 | Stop reason: SURG

## 2024-10-17 RX ORDER — SODIUM CHLORIDE, SODIUM LACTATE, POTASSIUM CHLORIDE, CALCIUM CHLORIDE 600; 310; 30; 20 MG/100ML; MG/100ML; MG/100ML; MG/100ML
INJECTION, SOLUTION INTRAVENOUS CONTINUOUS
Status: DISCONTINUED | OUTPATIENT
Start: 2024-10-17 | End: 2024-10-18

## 2024-10-17 RX ORDER — NALOXONE HYDROCHLORIDE 0.4 MG/ML
0.08 INJECTION, SOLUTION INTRAMUSCULAR; INTRAVENOUS; SUBCUTANEOUS AS NEEDED
Status: DISCONTINUED | OUTPATIENT
Start: 2024-10-17 | End: 2024-10-18

## 2024-10-17 RX ORDER — ROPIVACAINE HYDROCHLORIDE 5 MG/ML
INJECTION, SOLUTION EPIDURAL; INFILTRATION; PERINEURAL AS NEEDED
Status: DISCONTINUED | OUTPATIENT
Start: 2024-10-17 | End: 2024-10-17 | Stop reason: HOSPADM

## 2024-10-17 RX ORDER — ONDANSETRON 2 MG/ML
INJECTION INTRAMUSCULAR; INTRAVENOUS AS NEEDED
Status: DISCONTINUED | OUTPATIENT
Start: 2024-10-17 | End: 2024-10-17 | Stop reason: SURG

## 2024-10-17 RX ORDER — MORPHINE SULFATE 4 MG/ML
4 INJECTION, SOLUTION INTRAMUSCULAR; INTRAVENOUS EVERY 10 MIN PRN
Status: DISCONTINUED | OUTPATIENT
Start: 2024-10-17 | End: 2024-10-18

## 2024-10-17 RX ORDER — HYDROMORPHONE HYDROCHLORIDE 1 MG/ML
0.4 INJECTION, SOLUTION INTRAMUSCULAR; INTRAVENOUS; SUBCUTANEOUS EVERY 5 MIN PRN
Status: DISCONTINUED | OUTPATIENT
Start: 2024-10-17 | End: 2024-10-18

## 2024-10-17 RX ORDER — TRAMADOL HYDROCHLORIDE 50 MG/1
50 TABLET ORAL EVERY 8 HOURS PRN
Qty: 15 TABLET | Refills: 0 | Status: SHIPPED | OUTPATIENT
Start: 2024-10-17

## 2024-10-17 RX ORDER — LIDOCAINE HYDROCHLORIDE 10 MG/ML
INJECTION, SOLUTION EPIDURAL; INFILTRATION; INTRACAUDAL; PERINEURAL AS NEEDED
Status: DISCONTINUED | OUTPATIENT
Start: 2024-10-17 | End: 2024-10-17 | Stop reason: SURG

## 2024-10-17 RX ORDER — DEXAMETHASONE SODIUM PHOSPHATE 4 MG/ML
VIAL (ML) INJECTION AS NEEDED
Status: DISCONTINUED | OUTPATIENT
Start: 2024-10-17 | End: 2024-10-17 | Stop reason: SURG

## 2024-10-17 RX ORDER — HYDROMORPHONE HYDROCHLORIDE 1 MG/ML
0.2 INJECTION, SOLUTION INTRAMUSCULAR; INTRAVENOUS; SUBCUTANEOUS EVERY 5 MIN PRN
Status: DISCONTINUED | OUTPATIENT
Start: 2024-10-17 | End: 2024-10-18

## 2024-10-17 RX ORDER — MORPHINE SULFATE 10 MG/ML
6 INJECTION, SOLUTION INTRAMUSCULAR; INTRAVENOUS EVERY 10 MIN PRN
Status: DISCONTINUED | OUTPATIENT
Start: 2024-10-17 | End: 2024-10-18

## 2024-10-17 RX ORDER — MORPHINE SULFATE 4 MG/ML
2 INJECTION, SOLUTION INTRAMUSCULAR; INTRAVENOUS EVERY 10 MIN PRN
Status: DISCONTINUED | OUTPATIENT
Start: 2024-10-17 | End: 2024-10-18

## 2024-10-17 RX ORDER — ONDANSETRON 2 MG/ML
4 INJECTION INTRAMUSCULAR; INTRAVENOUS EVERY 6 HOURS PRN
Status: CANCELLED | OUTPATIENT
Start: 2024-10-17

## 2024-10-17 RX ORDER — ACETAMINOPHEN 500 MG
1000 TABLET ORAL EVERY 8 HOURS PRN
Qty: 40 TABLET | Refills: 0 | Status: SHIPPED | OUTPATIENT
Start: 2024-10-17

## 2024-10-17 RX ORDER — ACETAMINOPHEN 500 MG
1000 TABLET ORAL ONCE
Status: COMPLETED | OUTPATIENT
Start: 2024-10-17 | End: 2024-10-17

## 2024-10-17 RX ORDER — IBUPROFEN 200 MG
600 TABLET ORAL EVERY 8 HOURS PRN
Qty: 40 TABLET | Refills: 0 | Status: SHIPPED | OUTPATIENT
Start: 2024-10-17

## 2024-10-17 RX ADMIN — DEXAMETHASONE SODIUM PHOSPHATE 4 MG: 4 MG/ML VIAL (ML) INJECTION at 20:00:00

## 2024-10-17 RX ADMIN — ONDANSETRON 4 MG: 2 INJECTION INTRAMUSCULAR; INTRAVENOUS at 20:01:00

## 2024-10-17 RX ADMIN — LIDOCAINE HYDROCHLORIDE 50 MG: 10 INJECTION, SOLUTION EPIDURAL; INFILTRATION; INTRACAUDAL; PERINEURAL at 19:45:00

## 2024-10-17 RX ADMIN — MIDAZOLAM HYDROCHLORIDE 2 MG: 1 INJECTION INTRAMUSCULAR; INTRAVENOUS at 19:45:00

## 2024-10-17 RX ADMIN — SODIUM CHLORIDE, SODIUM LACTATE, POTASSIUM CHLORIDE, CALCIUM CHLORIDE: 600; 310; 30; 20 INJECTION, SOLUTION INTRAVENOUS at 20:48:00

## 2024-10-17 RX ADMIN — SODIUM CHLORIDE, SODIUM LACTATE, POTASSIUM CHLORIDE, CALCIUM CHLORIDE: 600; 310; 30; 20 INJECTION, SOLUTION INTRAVENOUS at 19:39:00

## 2024-10-17 NOTE — ANESTHESIA PREPROCEDURE EVALUATION
Anesthesia PreOp Note    HPI:     Matilde Connor is a 69 year old female who presents for preoperative consultation requested by: Arsalan Fisher MD    Date of Surgery: 10/17/2024    Procedure(s):  Excisional biopsy mass left upper arm  Indication: Mass of arm, left [R22.32]    Relevant Problems   No relevant active problems       NPO:  Last Liquid Consumption Date: 10/17/24     Last Solid Consumption Date: 10/16/24     Last Liquid Consumption Date: 10/17/24          History Review:  Patient Active Problem List    Diagnosis Date Noted    Diarrhea, unspecified 2024    Lymphocytic esophagitis 2024    Partial hamstring tear 2024    Osteoporosis 2023    Myofascial pain 2023    Urgency-frequency syndrome 2023    Age-related osteoporosis without current pathological fracture 2023    Labral tear of hip joint 2023    Urge incontinence 2023    Keratoconjunctivitis sicca, not specified as Sjogren's 2022    Nuclear senile cataract 2022    Vaginal atrophy 2021    Low ferritin 2021    Neck pain 2021    SNHL (sensory-neural hearing loss), asymmetrical 2020    Routine medical exam 2018    Urinary urgency 2018    Postmenopausal 2018       Past Medical History:    Anemia    Esophageal reflux    Hearing impaired person, bilateral    hearing lss left ear-  does have hearing aide left    Osteoporosis    Visual impairment    glasses       Past Surgical History:   Procedure Laterality Date          X4    Colonoscopy  2009    [facility]    Correct bunion,othr methods Left     D & c      [place] teeth removed         Prescriptions Prior to Admission[1]  Current Medications and Prescriptions Ordered in Epic[2]    Allergies[3]    Family History   Problem Relation Age of Onset    Cancer Father         Leukemia    Other (Other) Father     Heart Disorder Mother     Diabetes Mother     Other (Other) Mother      Cancer Maternal Grandmother         breast cancer    Heart Disorder Maternal Grandfather     Cancer Paternal Grandfather         stomach cancer    Other (Other) Paternal Grandfather     Cancer Sister 50        breast, uterine cancer     Social History     Socioeconomic History    Marital status:    Tobacco Use    Smoking status: Never    Smokeless tobacco: Never   Vaping Use    Vaping status: Never Used   Substance and Sexual Activity    Alcohol use: Yes     Comment: occ    Drug use: No   Other Topics Concern    Exercise Yes    Seat Belt Yes    Special Diet Yes    Stress Concern Yes    Weight Concern No       Available pre-op labs reviewed.  Lab Results   Component Value Date    WBC 4.5 10/06/2024    RBC 4.03 10/06/2024    HGB 12.7 10/06/2024    HCT 38.4 10/06/2024    MCV 95.3 10/06/2024    MCH 31.5 10/06/2024    MCHC 33.1 10/06/2024    RDW 12.3 10/06/2024    .0 10/06/2024     Lab Results   Component Value Date     10/06/2024    K 3.9 10/06/2024     10/06/2024    CO2 30.0 10/06/2024    BUN 14 10/06/2024    CREATSERUM 0.64 10/06/2024    GLU 89 10/06/2024    CA 9.9 10/06/2024          Vital Signs:  Body mass index is 21.4 kg/m².   height is 1.575 m (5' 2\") and weight is 53.1 kg (117 lb). Her blood pressure is 115/63 and her pulse is 63. Her respiration is 16 and oxygen saturation is 100%.   Vitals:    10/11/24 1110 10/17/24 1527   BP:  115/63   Pulse:  63   Resp:  16   SpO2:  100%   Weight: 54.4 kg (120 lb) 53.1 kg (117 lb)   Height: 1.575 m (5' 2\") 1.575 m (5' 2\")        Anesthesia Evaluation      Airway   Mallampati: I  TM distance: >3 FB  Neck ROM: full  Dental      Pulmonary - normal exam   Cardiovascular - normal exam    Neuro/Psych    (+)  neuromuscular disease,        GI/Hepatic/Renal    (+) GERD    Endo/Other    (+) blood dyscrasia  Abdominal  - normal exam                 Anesthesia Plan:   ASA:  2  Plan:   MAC  Informed Consent Plan and Risks Discussed With:  Patient      I have  informed Matilde Connor and/or legal guardian or family member of the nature of the anesthetic plan, benefits, risks including possible dental damage if relevant, major complications, and any alternative forms of anesthetic management.   All of the patient's questions were answered to the best of my ability. The patient desires the anesthetic management as planned.  OLMAN POLANCO MD  10/17/2024 6:26 PM  Present on Admission:  **None**           [1]   Medications Prior to Admission   Medication Sig Dispense Refill Last Dose/Taking    Calcium Carbonate 1250 (500 Ca) MG Oral Chew Tab 90 tablets (112,500 mg total).   10/6/2024    pantoprazole 40 MG Oral Tab EC Take 1 tablet (40 mg total) by mouth.   10/17/2024 at  4:00 AM    Calcium 200 MG Oral Tab As Directed.   10/16/2024 at  8:30 AM    Cholecalciferol (VITAMIN D3) 2400 UNIT/ML Does not apply Liquid Take 4,000 mL by mouth As Directed.   10/16/2024 at  8:30 AM    OMEGA 3-6-9 FATTY ACIDS OR Take by mouth.   10/6/2024    Ferrous Sulfate (IRON) 325 (65 Fe) MG Oral Tab    9/18/2024    Magnesium 300 MG Oral Cap Take 1 capsule by mouth As Directed.   10/16/2024 at  8:30 AM    Estradiol 0.1 MG/GM Vaginal Cream Place 2 g vaginally.   10/3/2024    rifAXIMin (XIFAXAN) 550 MG Oral Tab 42 tablets (23,100 mg total). (Patient not taking: Reported on 9/17/2024)       alendronate 70 MG Oral Tab Now is having yearly infusion (Patient not taking: Reported on 9/17/2024)       Turmeric (QC TUMERIC COMPLEX OR) Take by mouth.   10/6/2024    Zinc Sulfate (ZINC 15) 66 MG Oral Tab Take by mouth.   10/6/2024    Ascorbic Acid (VITAMIN C) 500 MG Oral Cap Take 1 Bottle by mouth As Directed.   10/6/2024   [2]   Current Facility-Administered Medications Ordered in Epic   Medication Dose Route Frequency Provider Last Rate Last Admin    lactated ringers infusion   Intravenous Continuous Arsalan Fisher MD 20 mL/hr at 10/17/24 1548 New Bag at 10/17/24 1548    ceFAZolin (Ancef) 2g in 10mL  IV syringe premix  2 g Intravenous Once Arsalan Fisher MD         No current Epic-ordered outpatient medications on file.   [3]   Allergies  Allergen Reactions    Miconazole ITCHING     Burning and itching

## 2024-10-17 NOTE — INTERVAL H&P NOTE
Pre-op Diagnosis: Mass of arm, left [R22.32]    The above referenced H&P was reviewed by Arsalan Fisher MD on 10/17/2024, the patient was examined and no significant changes have occurred in the patient's condition since the H&P was performed.  I discussed with the patient and/or legal representative the potential benefits, risks and side effects of this procedure; the likelihood of the patient achieving goals; and potential problems that might occur during recuperation.  I discussed reasonable alternatives to the procedure, including risks, benefits and side effects related to the alternatives and risks related to not receiving this procedure.  We will proceed with procedure as planned.

## 2024-10-18 NOTE — DISCHARGE INSTRUCTIONS
No lift, push, pull for 1 week with left arm.     Keep incision dry for 3 days. After 3 days may remove dressings and wash w/ regular soap and water.     Follow up w/ Dr. Fisher's office in 2 weeks.       [facility] HOME CARE INSTRUCTIONS: POST-OP ANESTHESIA  The medication that you received for sedation or general anesthesia can last up to 24 hours. Your judgment and reflexes may be altered, even if you feel like your normal self.      We Recommend:   Do not drive any motor vehicle or bicycle   Avoid mowing the lawn, playing sports, or working with power tools/applicances (power saws, electric knives or mixers)   That you have someone stay with you on your first night home   Do not drink alcohol or take sleeping pills or tranquilizers   Do not sign legal documents within 24 hours of your procedure   If you had a nerve block for your surgery, take extra care not to put any pressure on your arm or hand for 24 hours    It is normal:  For you to have a sore throat if you had a breathing tube during surgery (while you were asleep!). The sore throat should get better within 48 hours. You can gargle with warm salt water (1/2 tsp in 4 oz warm water) or use a throat lozenge for comfort  To feel muscle aches or soreness especially in the abdomen, chest or neck. The achy feeling should go away in the next 24 hours  To feel weak, sleepy or \"wiped out\". Your should start feeling better in the next 24 hours.   To experience mild discomforts such as sore lip or tongue, headache, cramps, gas pains or a bloated feeling in your abdomen.   To experience mild back pain or soreness for a day or two if you had spinal or epidural anesthesia.   If you had laparoscopic surgery, to feel shoulder pain or discomfort on the day of surgery.   For some patients to have nausea after surgery/anesthesia    If you feel nausea or experience vomiting:   Try to move around less.   Eat less than usual or drink only liquids until the next morning   Nausea  should resolve in about 24 hours    If you have a problem when you are at home:    Call your surgeons office     Discharge Instructions: After Your Surgery  You’ve just had surgery. During surgery, you were given medicine called anesthesia to keep you relaxed and free of pain. After surgery, you may have some pain or nausea. This is common. Here are some tips for feeling better and getting well after surgery.   Going home  Your healthcare provider will show you how to take care of yourself when you go home. They'll also answer your questions. Have an adult family member or friend drive you home. For the first 24 hours after your surgery:   Don't drive or use heavy equipment.  Don't make important decisions or sign legal papers.  Take medicines as directed.  Don't drink alcohol.  Have someone stay with you, if needed. They can watch for problems and help keep you safe.  Be sure to go to all follow-up visits with your healthcare provider. And rest after your surgery for as long as your provider tells you to.   Coping with pain  If you have pain after surgery, pain medicine will help you feel better. Take it as directed, before pain becomes severe. Also, ask your healthcare provider or pharmacist about other ways to control pain. This might be with heat, ice, or relaxation. And follow any other instructions your surgeon or nurse gives you.      Stay on schedule with your medicine.      Tips for taking pain medicine  To get the best relief possible, remember these points:   Pain medicines can upset your stomach. Taking them with a little food may help.  Most pain relievers taken by mouth need at least 20 to 30 minutes to start to work.  Don't wait till your pain becomes severe before you take your medicine. Try to time your medicine so that you can take it before starting an activity. This might be before you get dressed, go for a walk, or sit down for dinner.  Constipation is a common side effect of some pain  medicines. Call your healthcare provider before taking any medicines such as laxatives or stool softeners to help ease constipation. Also ask if you should skip any foods. Drinking lots of fluids and eating foods such as fruits and vegetables that are high in fiber can also help. Remember, don't take laxatives unless your surgeon has prescribed them.  Drinking alcohol and taking pain medicine can cause dizziness and slow your breathing. It can even be deadly. Don't drink alcohol while taking pain medicine.  Pain medicine can make you react more slowly to things. Don't drive or run machinery while taking pain medicine.  Your healthcare provider may tell you to take acetaminophen to help ease your pain. Ask them how much you're supposed to take each day. Acetaminophen or other pain relievers may interact with your prescription medicines or other over-the-counter (OTC) medicines. Some prescription medicines have acetaminophen and other ingredients in them. Using both prescription and OTC acetaminophen for pain can cause you to accidentally overdose. Read the labels on your OTC medicines with care. This will help you to clearly know the list of ingredients, how much to take, and any warnings. It may also help you not take too much acetaminophen. If you have questions or don't understand the information, ask your pharmacist or healthcare provider to explain it to you before you take the OTC medicine.   Managing nausea  Some people have an upset stomach (nausea) after surgery. This is often because of anesthesia, pain, or pain medicine, less movement of food in the stomach, or the stress of surgery. These tips will help you handle nausea and eat healthy foods as you get better. If you were on a special food plan before surgery, ask your healthcare provider if you should follow it while you get better. Check with your provider on how your eating should progress. It may depend on the surgery you had. These general tips may  help:   Don't push yourself to eat. Your body will tell you when to eat and how much.  Start off with clear liquids and soup. They're easier to digest.  Next try semi-solid foods as you feel ready. These include mashed potatoes, applesauce, and gelatin.  Slowly move to solid foods. Don’t eat fatty, rich, or spicy foods at first.  Don't force yourself to have 3 large meals a day. Instead eat smaller amounts more often.  Take pain medicines with a small amount of solid food, such as crackers or toast. This helps prevent nausea.  When to call your healthcare provider  Call your healthcare provider right away if you have any of these:   You still have too much pain, or the pain gets worse, after taking the medicine. The medicine may not be strong enough. Or there may be a complication from the surgery.  You feel too sleepy, dizzy, or groggy. The medicine may be too strong.  Side effects such as nausea or vomiting. Your healthcare provider may advise taking other medicines to .  Skin changes such as rash, itching, or hives. This may mean you have an allergic reaction. Your provider may advise taking other medicines.  The incision looks different (for instance, part of it opens up).  Bleeding or fluid leaking from the incision site, and weren't told to expect that.  Fever of 100.4°F (38°C) or higher, or as directed by your provider.  Call 911  Call 911 right away if you have:   Trouble breathing  Facial swelling     If you have obstructive sleep apnea   You were given anesthesia medicine during surgery to keep you comfortable and free of pain. After surgery, you may have more apnea spells because of this medicine and other medicines you were given. The spells may last longer than normal.    At home:  Keep using the continuous positive airway pressure (CPAP) device when you sleep. Unless your healthcare provider tells you not to, use it when you sleep, day or night. CPAP is a common device used to treat obstructive sleep  apnea.  Talk with your provider before taking any pain medicine, muscle relaxants, or sedatives. Your provider will tell you about the possible dangers of taking these medicines.  Contact your provider if your sleeping changes a lot even when taking medicines as directed.  Curtis last reviewed this educational content on 10/1/2021  © 8731-3991 [employer]. All rights reserved. This information is not intended as a substitute for professional medical care. Always follow your healthcare professional's instructions.

## 2024-10-18 NOTE — ANESTHESIA PROCEDURE NOTES
Airway  Date/Time: 10/17/2024 7:47 PM  Urgency: Elective      General Information and Staff    Patient location during procedure: OR  Anesthesiologist: Mraiia Royal MD  Performed: anesthesiologist   Performed by: Mariia Royal MD  Authorized by: Mariia Royal MD      Indications and Patient Condition  Indications for airway management: anesthesia  Spontaneous ventilation: present  Sedation level: deep  Preoxygenated: yes  Patient position: sniffing  Mask difficulty assessment: 1 - vent by mask    Final Airway Details  Final airway type: supraglottic airway      Successful airway: classic  Size 3       Number of attempts at approach: 1

## 2024-10-18 NOTE — BRIEF OP NOTE
Pre-Operative Diagnosis: Mass of arm, left [R22.32]     Post-Operative Diagnosis: Mass left arm (likely lipoma)    Procedure Performed:   Excisional biopsy mass left upper arm    Surgeons and Role:     * Arsalan Fisher MD - Primary    Assistant(s):  PA: Raquel Isaacs PA    Anesthesia: Dr. Royal with general laryngeal mask anesthesia     Surgical Findings: Lipoma appearing mass within bicep muscle excised en toto.  Did not appear adherent.  Sent in formalin to pathology  Drain: None  Specimen: Left upper arm mass to pathology in formalin  Complications: None  Estimated Blood Loss: 5 cc  Stable to PACU.    Arsalan Fisher MD  10/17/2024  7:31 PM

## 2024-10-18 NOTE — ANESTHESIA POSTPROCEDURE EVALUATION
Patient: Matilde Connor    Procedure Summary       Date: 10/17/24 Room / Location: [facility] MAIN OR  / [facility] MAIN OR    Anesthesia Start: 1938 Anesthesia Stop:     Procedure: Excisional biopsy mass left upper arm (Left: Upper Arm) Diagnosis:       Mass of arm, left      (Mass of arm, left [R22.32])    Surgeons: Arsalan Fisher MD Anesthesiologist: Olman Royal MD    Anesthesia Type: MAC ASA Status: 2            Anesthesia Type: MAC    Vitals Value Taken Time   /67 10/17/24 2049   Temp 97.8 °F (36.6 °C) 10/17/24 2049   Pulse 66 10/17/24 2049   Resp 20 10/17/24 2049   SpO2 100 % 10/17/24 2049   Vitals shown include unfiled device data.    [facility] AN Post Evaluation:   Patient Evaluated in PACU  Patient Participation: complete - patient participated  Level of Consciousness: awake  Pain Management: adequate  Airway Patency:patent  Dental exam unchanged from preop  Yes    Cardiovascular Status: acceptable  Respiratory Status: acceptable  Postoperative Hydration acceptable      OLMAN ROYAL MD  10/17/2024 8:50 PM

## 2024-10-18 NOTE — OPERATIVE REPORT
[facility]    PATIENT'S NAME: DANO DONG   ATTENDING PHYSICIAN: Arsalan Fisher MD   OPERATING PHYSICIAN: Arsalan Fisher MD   PATIENT ACCOUNT#:   [id number]    LOCATION:  [address]  MEDICAL RECORD #:   [id number]       YOB: 1955  ADMISSION DATE:       10/17/2024      OPERATION DATE:  10/17/2024    OPERATIVE REPORT      PREOPERATIVE DIAGNOSIS:  Mass, upper left arm.  POSTOPERATIVE DIAGNOSIS:  Mass, upper left arm (likely lipoma).    PROCEDURE:  Excisional biopsy, mass left upper arm (10 cm x 5 cm x 5 cm within left biceps muscle).     ASSISTANT:  Raquel Isaacs PA-C.    ANESTHESIA:  Dr. Royal, with general laryngeal mask anesthesia.    INDICATIONS:  The patient is a 69-year-old woman with the above mass, very evident as she is fairly slender.  There is discomfort with the mass.  MRI confirmed the mass within the biceps muscle.  The risks and complications of surgical excision were discussed and no guarantees were given.  The consent was signed.    OPERATIVE TECHNIQUE:  Patient was brought to the operating, placed in supine position.  Appropriate IV access and monitors were placed.  Ancef 2 g IV was given as a prophylaxis.  SCD boots were on both legs.  General laryngeal mask anesthesia was performed by Dr. Royal.  Her left upper extremity and shoulder were prepped and draped in sterile fashion with ChloraPrep.    No tourniquet was used.  Incision was made over the biceps and blunt dissection took place to the fascia.  The fascia was incised, and the mass had just a small linear area between muscle fibers.  I carefully dissected the muscle fibers off the mass trying to preserve the muscle fibers, because again she is quite slender and has small muscles to begin with.  The mass was able to extrude through the incision and I continued to work circumferentially around the mass with blunt dissection primarily.  Any small punctate bleeding was controlled with  electrocautery.  I made sure I was able to excise the mass in toto.  As per the patient's wishes, a nurse used my phone to take a photograph of the mass with a ruler.  Again with the patient had asked me to have a picture of it in the preop area with her  present.  As per her wishes, I texted these photographs to her .    The wound was irrigated with saline.  It was checked again for any bleeding.  Once hemostasis was ensured, the fascia was closed with #1 Vicryl suture in a running stitch.  I injected 15 mL of Naropin 0.5% in the skin and muscle.  Care was taken to avoid going deep to avoid the neurovascular bundle deep to the biceps muscle.  Subcutaneous was closed with 2-0 Vicryl suture in inverted stitch and Steri-Strips closed the skin.  Xeroform, sterile gauze, and Tegaderm completed the dressing.  An Ace wrap with light compression was placed.  I did instruct the  by telephone that if she was getting numbness and tingling or swelling distally, she could remove the Ace wrap.  The patient was then awakened, extubated, brought to the recovery room in stable condition.  Postoperative instructions had been given to her and her  in both written and oral form.  She will follow up Dr. Fisher in 10 to 12 days' time in the office.    Blood loss was 5 mL.  The specimen was the left upper arm mass in formalin to Pathology and again had the appearance of a lipoma.  There were no drains, no complications, and the patient tolerated the procedure well.    Dictated By Arsalan Fisher MD  d: 10/17/2024 20:56:22  t: 10/18/2024 03:16:21  Job [id number]  [facility]/    cc: Rustam Elaine MD

## 2024-10-19 ENCOUNTER — TELEPHONE (OUTPATIENT)
Dept: ORTHOPEDICS CLINIC | Facility: CLINIC | Age: 69
End: 2024-10-19

## 2024-10-19 NOTE — TELEPHONE ENCOUNTER
Post-op call for Dr. Fisher    Date: 10/19/2024      Time: 3:29 PM   Patient: Matilde Connor      number: [id number]   Surgery and surgery date:10/17/2024  Patient unavailable.  Left message on voicemail to call clinic with questions or concerns.  Number was provided./ SPOKE TO PATIENT  Patient's general feeling since discharge:Going OK/ My arm is swollen.I'm having difficulty with the sling. We discussed applying it more comfortably, and use pillows when she is sitting.   Pain control (0-10):/0  Pain Medication:  dose/strength/  Medication Quantity Refills Start End   traMADol 50 MG Oral Tab         Medication Quantity Refills Start End   ibuprofen 200 MG Oral Tab         Medication Quantity Refills Start End   acetaminophen 500 MG Oral Tab 40 tablet 0 10/17/2024 --   Sig:   Take 2 tablets (1,000 mg total) by mouth every 8 (eight) hours as needed for Pain.       I explained she needs the advil for the swelling and I gave her the ice protocol to put under her axilla 20min on and 20min off multiple times a day. She will work out the tylenol vs. the advil for better help and use the tramadol at bedtime  Fever:  no  Chills:  no  SOB:  no  Incision site appearance:  Redness   no  Drainage no  Clean/dry/Intact yes  Calf pain, redness or warmth:  no   Bowel Regimen: yes   If not, what are you taking stool softener/laxative?  Confirmed appointment date for post op:/10/29/2024  Other concerns patients may ask:   Bathing and bandages   Patient needs to keep incision clean and dry for the three days. Then  with mild soap, pat dry and cover with band aides  Enforce rest, ice, compression elevation and use their pain medication accordingly./DONE  If the patient needs more pain medication, please put in a communication.

## 2024-10-25 ENCOUNTER — TELEPHONE (OUTPATIENT)
Dept: ORTHOPEDICS CLINIC | Facility: CLINIC | Age: 69
End: 2024-10-25

## 2024-10-25 NOTE — TELEPHONE ENCOUNTER
Per patient had surgery on 10/17, states instead of getting better she is doing worse, requesting to speak to RN to address concerns. Please call thank you.

## 2024-10-25 NOTE — TELEPHONE ENCOUNTER
Spoke with patient. Surgery on 10/17/24. Endorses that when she got home her pain was minimal. Surprisingly minimal, only needed advil after first day. For the past few days, the pain has gotten worse and there is bruising all down the arm. Pain increased on inside of elbow with straightening of arm. Lower arm hurts. Reports that the back of arm and hand are not necessarily numb, but have less feeling. Reports pain is worst when she wakes up, and it has woken her form sleep in middle of night. Has been icing and tried elevating, but it brings more pain. She has not taken any of the Tramadol since the first day or so, only advil. She can move hand and swelling has subsided. She does report that she has increased her activity and movement. I did advise that all of her symptoms can be normal part of the healing process and that postop, with increased activity, all of the reported symptoms can occur. She is wondering if there are other recommendations or if she can or should be seen prior to her appointment on 10/29/24

## 2024-10-25 NOTE — TELEPHONE ENCOUNTER
Spoke with patient and let her know that on call provider did not find anything that was worrisome in what was reported. I did advise that if any worsening, increase in pain, fever, chills, chest pain, major swelling to reach back out, even if over the weekend for on call provider. Verbalized understanding.

## 2024-10-25 NOTE — TELEPHONE ENCOUNTER
This is Dr. Fisher's patient. The symptoms do not sound overly concerning. Pleae forward the message to Dr. Fisher to see if he wants to bring the patient in sooner.

## 2024-10-28 LAB — Lab: NEGATIVE

## 2024-10-29 ENCOUNTER — OFFICE VISIT (OUTPATIENT)
Dept: ORTHOPEDICS CLINIC | Facility: CLINIC | Age: 69
End: 2024-10-29

## 2024-10-29 DIAGNOSIS — R22.32 MASS OF ARM, LEFT: Primary | ICD-10-CM

## 2024-10-29 DIAGNOSIS — R52 PAIN: ICD-10-CM

## 2024-10-29 PROCEDURE — 99024 POSTOP FOLLOW-UP VISIT: CPT

## 2024-10-29 NOTE — PROGRESS NOTES
NURSING INTAKE COMMENTS:   Chief Complaint   Patient presents with    Post-Op     1st POV left upper arm Excisional biopsy of mass, sx 10/17/2024. Pain 2-5/10 worse at night time during sleeping and in the morning.       HPI: This 69 year old female presents today approximately 1.5 weeks status post excisional biopsy mass of the left upper arm.  Overall the patient reports that she is doing okay.  She has pain intermittently, and is sometimes worse at night.  Reports that her biggest complaint is that her elbow now feels stiff, she has not been doing it.  She was unsure if she could do range of motion exercises.  She also has extensive bruising throughout her left upper extremity to her wrist.  She reports that she has intermittent tingling in her left third finger, but is improving.  She denies any of this tingling today.  She denies any fever, chills, night sweats.    I discussed with the patient that the official results from her pathology came back, which diagnosed it as a benign lipoma.    Past Medical History:    Anemia    Esophageal reflux    Hearing impaired person, bilateral    hearing lss left ear-  does have hearing aide left    Osteoporosis    Visual impairment    glasses     Past Surgical History:   Procedure Laterality Date          X4    Colonoscopy  2009    [facility]    Correct bunion,othr methods Left     D & c      [place] teeth removed       Current Outpatient Medications   Medication Sig Dispense Refill    traMADol 50 MG Oral Tab Take 1 tablet (50 mg total) by mouth every 8 (eight) hours as needed. No alcohol or driving on this med. Stop if lethargic or hallucinating. 15 tablet 0    ibuprofen 200 MG Oral Tab Take 3 tablets (600 mg total) by mouth every 8 (eight) hours as needed. Take with food. Stop if stomach upset. 40 tablet 0    acetaminophen 500 MG Oral Tab Take 2 tablets (1,000 mg total) by mouth every 8 (eight) hours as needed for Pain. 40 tablet 0    rifAXIMin (XIFAXAN) 550  MG Oral Tab 42 tablets (23,100 mg total).      Ferrous Sulfate (IRON) 325 (65 Fe) MG Oral Tab        Allergies[1]  Family History   Problem Relation Age of Onset    Cancer Father         Leukemia    Other (Other) Father     Heart Disorder Mother     Diabetes Mother     Other (Other) Mother     Cancer Maternal Grandmother         breast cancer    Heart Disorder Maternal Grandfather     Cancer Paternal Grandfather         stomach cancer    Other (Other) Paternal Grandfather     Cancer Sister 50        breast, uterine cancer     No family Hx of DVT/PE    Social History     Occupational History    Not on file   Tobacco Use    Smoking status: Never    Smokeless tobacco: Never   Vaping Use    Vaping status: Never Used   Substance and Sexual Activity    Alcohol use: Yes     Comment: occ    Drug use: No    Sexual activity: Not on file        Review of Systems:  GENERAL: feels generally well, no significant weight loss or weight gain  SKIN: no ulcerated or worrisome skin lesions  EYES:denies blurred vision or double vision  HEENT: denies new nasal congestion, sinus pain or ST  LUNGS: denies shortness of breath  CARDIOVASCULAR: denies chest pain  GI: no hematemesis, no worsening heartburn, no diarrhea  : no dysuria, no blood in urine, no difficulty urinating, no incontinence  MUSCULOSKELETAL: no other musculoskeletal complaints other than in HPI  NEURO: no numbness or tingling, no weakness or balance disorder  PSYCHE: no depression or anxiety  HEMATOLOGIC: no hx of blood dyscrasia, no Hx DVT/PE  ENDOCRINE: no thyroid or diabetes issues  ALL/ASTHMA: no new hx of severe allergy or asthma    Physical Examination:    LMP  (LMP Unknown)   Constitutional: appears well hydrated, alert and responsive, no acute distress noted  Extremities: Left arm has extensive bruising from bicep to left wrist.  Incision healing well.  Small area of firm raised area around incision, discussed with the patient this was likely a lipoma.  No  asymmetric warmth or redness.  Musculoskeletal: Patient has full and symmetric range of motion in her shoulder.  Elbow extends 10 degrees, when compared to 0 on the right arm.  No tenderness to elbow today.  Patient has full and symmetrical range of motion in her wrist.  Neurological: No weakness to median, ulnar, radial nerves.    Imaging: None taken today.      No results found.     Lab Results   Component Value Date    WBC 4.5 10/06/2024    HGB 12.7 10/06/2024    .0 10/06/2024      Lab Results   Component Value Date    GLU 89 10/06/2024    BUN 14 10/06/2024    CREATSERUM 0.64 10/06/2024    GFRNAA 99 05/24/2022    GFRAA 114 05/24/2022        Assessment and Plan:  Diagnoses and all orders for this visit:    Mass of arm, left  -     Physical Therapy Referral - [place] Locations    Pain  -     Physical Therapy Referral - [place] Locations        Assessment: As above    Plan: For the minimal area of swelling around her incision I discussed this was likely hematoma.  Discussed we would watch this, but at this time do not think it needs further workup.  For her increase stiffness we will begin physical therapy.  Discussed she could do all physical therapy, which should still be cautious of her incision.  Discussed she should not do any aggressive motions that could open her wound.  For her pain at night I discussed she could try taking Tylenol PM, which may help her stay asleep.    We will see her in 3 weeks for reevaluation and to see how she is doing.    Follow Up: No follow-ups on file.    BRITTANY Mccloud       [1]   Allergies  Allergen Reactions    Miconazole ITCHING     Burning and itching

## 2024-11-19 ENCOUNTER — OFFICE VISIT (OUTPATIENT)
Dept: ORTHOPEDICS CLINIC | Facility: CLINIC | Age: 69
End: 2024-11-19

## 2024-11-19 DIAGNOSIS — G56.01 RIGHT CARPAL TUNNEL SYNDROME: ICD-10-CM

## 2024-11-19 DIAGNOSIS — R52 PAIN: ICD-10-CM

## 2024-11-19 DIAGNOSIS — R22.32 MASS OF ARM, LEFT: Primary | ICD-10-CM

## 2024-11-19 PROCEDURE — 99024 POSTOP FOLLOW-UP VISIT: CPT

## 2024-11-19 NOTE — PROGRESS NOTES
NURSING INTAKE COMMENTS:   Chief Complaint   Patient presents with    Post-Op     S/p L upper arm excisional biopsy f/u - had sx on 10/17/24 - states she is better - has some tenderness and discoloration in her arm and weakness - rates pain as 1/10 on and off        HPI: This 69 year old female presents today approximately 5 weeks status post left arm excisional biopsy of the mass.  Patient reports that she is doing very well.  Overall her pain has greatly improved.  She denies any fever, chills, night sweats.  She has been in physical therapy, and feels like it has greatly improved her strength.  She reports that her strength is coming back, and is happy with her progress.  She reports that she has intermittent numbness and tingling on her right hand throughout the median nerve distribution.      She also asked me questions regarding her bilateral shoulders.  On the MRI to diagnose her left arm mass, it was an incidental finding of rotator cuff partial tear.  Her right arm injected with the COVID-vaccine approximately 2 weeks ago, and since then has had increased pain.  Discussed with her she can make an appointment to be seen in our office for bilateral shoulders to review the MRI as well as discuss options for both shoulders.  Past Medical History:    Anemia    Esophageal reflux    Hearing impaired person, bilateral    hearing lss left ear-  does have hearing aide left    Osteoporosis    Visual impairment    glasses     Past Surgical History:   Procedure Laterality Date          X4    Colonoscopy  2009    [facility]    Correct bunion,othr methods Left     D & c      [place] teeth removed       Current Outpatient Medications   Medication Sig Dispense Refill    traMADol 50 MG Oral Tab Take 1 tablet (50 mg total) by mouth every 8 (eight) hours as needed. No alcohol or driving on this med. Stop if lethargic or hallucinating. 15 tablet 0    ibuprofen 200 MG Oral Tab Take 3 tablets (600 mg total) by mouth  every 8 (eight) hours as needed. Take with food. Stop if stomach upset. 40 tablet 0    acetaminophen 500 MG Oral Tab Take 2 tablets (1,000 mg total) by mouth every 8 (eight) hours as needed for Pain. 40 tablet 0    rifAXIMin (XIFAXAN) 550 MG Oral Tab 42 tablets (23,100 mg total).      Ferrous Sulfate (IRON) 325 (65 Fe) MG Oral Tab        Allergies[1]  Family History   Problem Relation Age of Onset    Cancer Father         Leukemia    Other (Other) Father     Heart Disorder Mother     Diabetes Mother     Other (Other) Mother     Cancer Maternal Grandmother         breast cancer    Heart Disorder Maternal Grandfather     Cancer Paternal Grandfather         stomach cancer    Other (Other) Paternal Grandfather     Cancer Sister 50        breast, uterine cancer     No family Hx of DVT/PE    Social History     Occupational History    Not on file   Tobacco Use    Smoking status: Never    Smokeless tobacco: Never   Vaping Use    Vaping status: Never Used   Substance and Sexual Activity    Alcohol use: Yes     Comment: occ    Drug use: No    Sexual activity: Not on file        Review of Systems:  GENERAL: feels generally well, no significant weight loss or weight gain  SKIN: no ulcerated or worrisome skin lesions  EYES:denies blurred vision or double vision  HEENT: denies new nasal congestion, sinus pain or ST  LUNGS: denies shortness of breath  CARDIOVASCULAR: denies chest pain  GI: no hematemesis, no worsening heartburn, no diarrhea  : no dysuria, no blood in urine, no difficulty urinating, no incontinence  MUSCULOSKELETAL: no other musculoskeletal complaints other than in HPI  NEURO: no numbness or tingling, no weakness or balance disorder  PSYCHE: no depression or anxiety  HEMATOLOGIC: no hx of blood dyscrasia, no Hx DVT/PE  ENDOCRINE: no thyroid or diabetes issues  ALL/ASTHMA: no new hx of severe allergy or asthma    Physical Examination:    LMP  (LMP Unknown)   Constitutional: appears well hydrated, alert and  responsive, no acute distress noted  Extremities: Left arm bruising has improved, very minimal discoloration in the distribution of where the bruises were, but is improving.  Incision healing cosmetically.  No asymmetric warmth or redness.  Musculoskeletal: Patient has full symmetric range of motion of both shoulders.  Elbow has full range of motion in extension, flexion, supination, and pronation.  Patient has mild tenderness to medial lateral epicondyles today.  Positive median compression test at 30 seconds.  No thenar or hypothenar atrophy.  No weakness to median, ulnar, radial nerves.  Neurological: Motor and sensory function intact.    Imaging: None taken today.      No results found.     Lab Results   Component Value Date    WBC 4.5 10/06/2024    HGB 12.7 10/06/2024    .0 10/06/2024      Lab Results   Component Value Date    GLU 89 10/06/2024    BUN 14 10/06/2024    CREATSERUM 0.64 10/06/2024    GFRNAA 99 05/24/2022    GFRAA 114 05/24/2022        Assessment and Plan:  Diagnoses and all orders for this visit:    Mass of arm, left    Pain    Right carpal tunnel syndrome        Assessment: As above    Plan: Discussed with the patient today Per physical therapy as well as her home exercises to regain strength.    For her right carpal tunnel syndrome discussed we could order an ultrasound.  She discussed that this does not bother her as much, so we will hold off for now.    She will make a new appointment to be seen for bilateral shoulders.  Discussed we would assess her incision and ensure that she is continuing to improve from her excisional biopsy at that time.    Follow Up: No follow-ups on file.    BRITTANY Mccloud       [1]   Allergies  Allergen Reactions    Miconazole ITCHING     Burning and itching

## 2024-11-27 ENCOUNTER — HOSPITAL ENCOUNTER (OUTPATIENT)
Dept: GENERAL RADIOLOGY | Age: 69
Discharge: HOME | End: 2024-11-27
Attending: STUDENT IN AN ORGANIZED HEALTH CARE EDUCATION/TRAINING PROGRAM
Payer: MEDICARE

## 2024-11-27 ENCOUNTER — OFFICE VISIT (OUTPATIENT)
Dept: FAMILY MEDICINE CLINIC | Facility: CLINIC | Age: 69
End: 2024-11-27
Payer: MEDICARE

## 2024-11-27 VITALS
DIASTOLIC BLOOD PRESSURE: 68 MMHG | WEIGHT: 120 LBS | HEIGHT: 62.25 IN | BODY MASS INDEX: 21.8 KG/M2 | HEART RATE: 65 BPM | OXYGEN SATURATION: 97 % | SYSTOLIC BLOOD PRESSURE: 104 MMHG | TEMPERATURE: 99 F

## 2024-11-27 DIAGNOSIS — M25.611 SHOULDER STIFFNESS, RIGHT: ICD-10-CM

## 2024-11-27 DIAGNOSIS — M25.511 ACUTE PAIN OF RIGHT SHOULDER: ICD-10-CM

## 2024-11-27 DIAGNOSIS — M25.511 ACUTE PAIN OF RIGHT SHOULDER: Primary | ICD-10-CM

## 2024-11-27 PROCEDURE — 99214 OFFICE O/P EST MOD 30 MIN: CPT | Performed by: STUDENT IN AN ORGANIZED HEALTH CARE EDUCATION/TRAINING PROGRAM

## 2024-11-27 PROCEDURE — 73030 X-RAY EXAM OF SHOULDER: CPT | Performed by: STUDENT IN AN ORGANIZED HEALTH CARE EDUCATION/TRAINING PROGRAM

## 2024-11-27 RX ORDER — ESTRADIOL 0.1 MG/G
CREAM VAGINAL
COMMUNITY
Start: 2024-11-14

## 2024-11-27 RX ORDER — NAPROXEN 500 MG/1
500 TABLET ORAL 2 TIMES DAILY WITH MEALS
Qty: 10 TABLET | Refills: 0 | Status: SHIPPED | OUTPATIENT
Start: 2024-11-27 | End: 2024-12-02

## 2024-11-27 NOTE — PROGRESS NOTES
Subjective:   Matilde Connor is a 69 year old female who presents for Shoulder Pain (R shoulder pain better now but pain started after covid vaccine.)     69-year-old female, right-hand-dominant, complaining of right shoulder pain and stiffness that started on 10/16/2024 after receiving both COVID and influenza vaccination in the same right shoulder.  Patient felt that COVID vaccination was administered too high into the shoulder and felt a sting.  30 minutes later started feeling right shoulder stiffness and pain.    Overall improving at this time.  No history of right shoulder surgery or fracture.  Notes limited right shoulder ROM with  Abduction and flexion.  Took Advil as needed with good relief.  Currently doing PT for left arm postop.  Denies chest pain, SOB, difficulty breathing.    History/Other:    Chief Complaint Reviewed and Verified  Nursing Notes Reviewed and   Verified  Tobacco Reviewed  Allergies Reviewed  Problem List Reviewed    OB Status Reviewed         Tobacco:  She has never smoked tobacco.    Current Outpatient Medications   Medication Sig Dispense Refill    estradiol 0.1 MG/GM Vaginal Cream APPLY 2 GRAMS VAGINALLY 2 TIMES WEEKLY      naproxen 500 MG Oral Tab Take 1 tablet (500 mg total) by mouth 2 (two) times daily with meals for 5 days. 10 tablet 0    ibuprofen 200 MG Oral Tab Take 3 tablets (600 mg total) by mouth every 8 (eight) hours as needed. Take with food. Stop if stomach upset. 40 tablet 0    Ferrous Sulfate (IRON) 325 (65 Fe) MG Oral Tab       acetaminophen 500 MG Oral Tab Take 2 tablets (1,000 mg total) by mouth every 8 (eight) hours as needed for Pain. (Patient not taking: Reported on 11/27/2024) 40 tablet 0    rifAXIMin (XIFAXAN) 550 MG Oral Tab 42 tablets (23,100 mg total). (Patient not taking: Reported on 11/27/2024)           Review of Systems:  Review of Systems   Constitutional:  Negative for chills, diaphoresis and fever.   HENT:  Negative for congestion, ear  discharge, ear pain, sinus pressure, sinus pain and sore throat.    Eyes:  Negative for pain and discharge.   Respiratory:  Negative for cough, chest tightness, shortness of breath and wheezing.    Cardiovascular:  Negative for chest pain and palpitations.   Gastrointestinal:  Negative for abdominal pain, diarrhea, nausea and vomiting.   Endocrine: Negative for cold intolerance and heat intolerance.   Genitourinary:  Negative for dysuria, flank pain, frequency and urgency.   Musculoskeletal:  Negative for joint swelling.        Right shoulder pain and stiffness.   Skin:  Negative for rash.   Neurological:  Negative for dizziness, syncope and headaches.   Psychiatric/Behavioral:  Negative for confusion and hallucinations.        Objective:   /68 (BP Location: Left arm, Patient Position: Sitting, Cuff Size: adult)   Pulse 65   Temp 98.7 °F (37.1 °C) (Oral)   Ht 5' 2.25\" (1.581 m)   Wt 120 lb (54.4 kg)   LMP  (LMP Unknown)   SpO2 97%   BMI 21.77 kg/m²  Estimated body mass index is 21.77 kg/m² as calculated from the following:    Height as of this encounter: 5' 2.25\" (1.581 m).    Weight as of this encounter: 120 lb (54.4 kg).  Physical Exam  Constitutional:       General: She is not in acute distress.     Appearance: Normal appearance. She is not ill-appearing or toxic-appearing.   HENT:      Head: Normocephalic and atraumatic.   Cardiovascular:      Rate and Rhythm: Normal rate and regular rhythm.      Heart sounds: Normal heart sounds. No murmur heard.     No gallop.   Pulmonary:      Effort: Pulmonary effort is normal. No respiratory distress.      Breath sounds: Normal breath sounds. No stridor. No wheezing, rhonchi or rales.   Abdominal:      General: Bowel sounds are normal.      Palpations: Abdomen is soft.   Musculoskeletal:      Cervical back: Normal range of motion and neck supple. No rigidity or tenderness.      Comments: Right shoulder: No tenderness over trapezius muscle, tenderness over  acromion.  Active abduction limited to 90 degrees (with pain/discomfort between 45 to 90 degrees).  Discomfort with passive abduction to 90 degrees.  Discomfort with active and passive flexion to 90 degrees.  Negative Neer and Monroy test.   Skin:     General: Skin is warm and dry.   Neurological:      General: No focal deficit present.      Mental Status: She is alert and oriented to person, place, and time. Mental status is at baseline.   Psychiatric:         Mood and Affect: Mood normal.         Behavior: Behavior normal.         Thought Content: Thought content normal.         Judgment: Judgment normal.         Assessment & Plan:     XR SHOULDER, COMPLETE (MIN 2 VIEWS), RIGHT (CPT=73030)    Result Date: 11/27/2024  CONCLUSION:  No radiographically visible acute osseous injury of the right shoulder.    Dictated by (CST): Wilmer Lama MD on 11/27/2024 at 10:58 AM     Finalized by (CST): Wilmer Lama MD on 11/27/2024 at 10:59 AM           1. Acute pain of right shoulder (Primary)  Biceps tenosynovitis vs adhesive capsulitis vs other pathologies.  10/6/2024 metabolic profile reviewed.  -     XR SHOULDER, COMPLETE (MIN 2 VIEWS), RIGHT (CPT=73030); Future; Expected date: 11/27/2024  -     Naproxen; Take 1 tablet (500 mg total) by mouth 2 (two) times daily with meals for 5 days.  Dispense: 10 tablet; Refill: 0  -     Physical Therapy Referral - External  2. Shoulder stiffness, right  As above.  -     XR SHOULDER, COMPLETE (MIN 2 VIEWS), RIGHT (CPT=73030); Future; Expected date: 11/27/2024  -     Naproxen; Take 1 tablet (500 mg total) by mouth 2 (two) times daily with meals for 5 days.  Dispense: 10 tablet; Refill: 0  -     Physical Therapy Referral - External        Return if symptoms worsen or fail to improve.    Rustam Elaine MD, 11/27/2024, 8:05 AM    No

## 2024-12-03 ENCOUNTER — OFFICE VISIT (OUTPATIENT)
Dept: OTOLARYNGOLOGY | Facility: CLINIC | Age: 69
End: 2024-12-03

## 2024-12-03 VITALS — HEIGHT: 62 IN | BODY MASS INDEX: 22.08 KG/M2 | WEIGHT: 120 LBS

## 2024-12-03 DIAGNOSIS — H91.90 HEARING LOSS, UNSPECIFIED HEARING LOSS TYPE, UNSPECIFIED LATERALITY: Primary | ICD-10-CM

## 2024-12-03 DIAGNOSIS — H92.09 OTALGIA, UNSPECIFIED LATERALITY: ICD-10-CM

## 2024-12-03 PROCEDURE — 99213 OFFICE O/P EST LOW 20 MIN: CPT | Performed by: OTOLARYNGOLOGY

## 2024-12-05 ENCOUNTER — TELEPHONE (OUTPATIENT)
Dept: FAMILY MEDICINE CLINIC | Facility: CLINIC | Age: 69
End: 2024-12-05

## 2024-12-05 NOTE — TELEPHONE ENCOUNTER
Received fax from [employer] for MD signature for  initial evaluation of left shoulder, with referring physician listed as BRITTANY Mccloud.    RN s/w Cristel at [employer] who will re-fax initial evaluation to correct office.

## 2024-12-05 NOTE — TELEPHONE ENCOUNTER
Received fax from [employer] for MD signature for re-evaluation for right shoulder. Signed forms faxed back to [employer].

## 2024-12-08 NOTE — PROGRESS NOTES
Matilde Connor is a 69 year old female.    Chief Complaint   Patient presents with    Ear Problem     Patient presents with hearing a noise in left ear when turning head to the right       HISTORY OF PRESENT ILLNESS  She presents with a 3 to 4-month history of decreased hearing on the left.  She states that this came on suddenly without any associated symptoms of vertigo or tinnitus.  She denies having any type of acute viral or upper respiratory tract infection during the onset of this hearing loss.  She states that she was lying in bed and turned in one direction and realized that she cannot hear very well from the other side.  No changes in hearing since then.  Took some time before she had an audiogram which was performed on January 6 of 2020 with a finding of normal downsloping to mild sensorineural hearing loss on the right with mild downsloping to moderate sensorineural hearing loss on the left.  Speech is clear discrimination scores are essentially 100% bilaterally and tympanograms are normal bilaterally.  No other signs, symptoms or complaints.  No history of diabetes stroke or hypertension.     1/28/20 last visit she was started on prednisone burst and taper.  She notes no change in her hearing although she does have some perceptual changes with the ear feeling less full at times.  No other new signs, symptoms or complaints.  Audiogram was repeated today demonstrating no change in her pure-tone thresholds on the left.     6/4/2020 she presents today with exacerbation of her tinnitus.  She feels that the tinnitus that she has had primarily on the left side is now louder.  She does admit to having significant stress and anxiety due to the current pandemic and the newer race issues occurring in the [facility].  She states that these issues with tinnitus started around the time of the onset of the pandemic.  She does describe herself as being very worried about things.  Audiogram performed today  demonstrates no improvement in her hearing with stable pure-tone thresholds at all frequencies since she originally had a problem in early January.  I did recommend an E BR versus an MRI to rule out a retrocochlear process and she chose to do an ABR.  ABR performed in January 2020 demonstrates normal potentials bilaterally.  No evidence of a retrocochlear abnormality.  Her previous studies and this recent ABR results were discussed at length today.     4/28/22 I last saw her 2 years ago and at that time she had a normal ABR with a known history of hearing loss on the left.  Does not use amplification in that ear as she feels that she hears reasonably well on the other side.  Does complain of statically sound on the left side.  This is constant and is better when there is any sounds present.  She does feel that her hearing has decreased on the left side as she is asking for things to be repeated more frequently and also notes that she cannot understand certain people as she feels that they are not articulating as well as they should.  Audiogram was performed today based on her complaints with a finding of progressive sensorineural hearing loss at the mid and high frequencies only on the left.  Right is stable.  Here to discuss further management     6/17/22 she presents today with a sensation of decreased hearing on the left.  Previously noted to have normal ABR and more recently had an MRI that showed no abnormalities.  Repeat audiogram was performed based on her concerns of decreased hearing which showed no changes at any frequencies on the left.  Speech discrimination scores are unchanged from last study.  She does have a history of TMJ she is not sure to see if she has been clenching or grinding more recently.     6/16/23 doing well no new complaints or concerns here for routine hearing test.  Previous MRI of the brain reveals no abnormalities previous ABR suggest no retrocochlear abnormalities.  Audiogram  performed today demonstrates essentially unchanged hearing with mild downsloping to moderate sensory hearing loss on the left with normal hearing at the low and mid frequencies on the right and a mild high-frequency loss with normal tympanograms and speech discrimination score      6/18/24 she feels that her hearing is worsened.  Now noticing more frequently that she is having difficulty hearing people.  Previous audiogram demonstrated normal downsloping to mild hearing loss on the right and mild downsloping to moderate hearing loss on the left with normal speech discrimination scores and tympanograms.  Also complains of some fullness in her left ear with itchiness and a sensation of something crawling in her ears at times.  She does grind her teeth and will be getting a bite guard     12/3/24 she presents today with concerns about a sound that she hears in her ear when she turns her head in 1 direction.  I have suspected her of having TMJ issues causing her to have otologic signs and symptoms.  Since I last saw her she was supposed to be getting a bite guard.  Known history of asymmetric hearing loss on the left with normal downsloping to moderate hearing loss on the right and mild downsloping to severe sensorineural hearing loss on the left.  She does not feel any significant change in her hearing since I last saw her 6 months ago.  Scheduled to have a repeat audiogram in June of next year.  No other new signs, symptoms or complaints.      Social History     Socioeconomic History    Marital status:    Tobacco Use    Smoking status: Never     Passive exposure: Never    Smokeless tobacco: Never   Vaping Use    Vaping status: Never Used   Substance and Sexual Activity    Alcohol use: Yes     Comment: occ    Drug use: No   Other Topics Concern    Exercise Yes    Seat Belt Yes    Special Diet Yes    Stress Concern Yes    Weight Concern No       Family History   Problem Relation Age of Onset    Cancer Father          Leukemia    Other (Other) Father     Heart Disorder Mother     Diabetes Mother     Other (Other) Mother     Cancer Maternal Grandmother         breast cancer    Heart Disorder Maternal Grandfather     Cancer Paternal Grandfather         stomach cancer    Other (Other) Paternal Grandfather     Cancer Sister 50        breast, uterine cancer       Past Medical History:    Anemia    Esophageal reflux    Hearing impaired person, bilateral    hearing lss left ear-  does have hearing aide left    Osteoporosis    Visual impairment    glasses       Past Surgical History:   Procedure Laterality Date          X4    Colonoscopy  2009    [facility]    Correct bunion,othr methods Left     D & c      [place] teeth removed           REVIEW OF SYSTEMS    System Neg/Pos Details   Constitutional Negative Fatigue, fever and weight loss.   ENMT Negative Drooling.   Eyes Negative Blurred vision and vision changes.   Respiratory Negative Dyspnea and wheezing.   Cardio Negative Chest pain, irregular heartbeat/palpitations and syncope.   GI Negative Abdominal pain and diarrhea.   Endocrine Negative Cold intolerance and heat intolerance.   Neuro Negative Tremors.   Psych Negative Anxiety and depression.   Integumentary Negative Frequent skin infections, pigment change and rash.   Hema/Lymph Negative Easy bleeding and easy bruising.           PHYSICAL EXAM    Ht 5' 2\" (1.575 m)   Wt 120 lb (54.4 kg)   LMP  (LMP Unknown)   BMI 21.95 kg/m²        Constitutional Normal Overall appearance - Normal.   Psychiatric Normal Orientation - Oriented to time, place, person & situation. Appropriate mood and affect.   Neck Exam Normal Inspection - Normal. Palpation - Normal. Parotid gland - Normal. Thyroid gland - Normal.   Eyes Normal Conjunctiva - Right: Normal, Left: Normal. Pupil - Right: Normal, Left: Normal. Fundus - Right: Normal, Left: Normal.   Neurological Normal Memory - Normal. Cranial nerves - Cranial nerves II through XII  grossly intact.   Head/Face Normal Facial features - Normal. Eyebrows - Normal. Skull - Normal.        Nasopharynx Normal External nose - Normal. Lips/teeth/gums - Normal. Tonsils - Normal. Oropharynx - Normal.   Ears Normal Inspection - Right: Normal, Left: Normal. Canal - Right: Normal, Left: Normal. TM - Right: Normal, Left: Normal.   Skin Normal Inspection - Normal.        Lymph Detail Normal Submental. Submandibular. Anterior cervical. Posterior cervical. Supraclavicular.        Nose/Mouth/Throat Normal External nose - Normal. Lips/teeth/gums - Normal. Tonsils - Normal. Oropharynx - Normal.   Nose/Mouth/Throat Normal Nares - Right: Normal Left: Normal. Septum -Normal  Turbinates - Right: Normal, Left: Normal.       Current Outpatient Medications:     estradiol 0.1 MG/GM Vaginal Cream, APPLY 2 GRAMS VAGINALLY 2 TIMES WEEKLY, Disp: , Rfl:     ibuprofen 200 MG Oral Tab, Take 3 tablets (600 mg total) by mouth every 8 (eight) hours as needed. Take with food. Stop if stomach upset., Disp: 40 tablet, Rfl: 0    acetaminophen 500 MG Oral Tab, Take 2 tablets (1,000 mg total) by mouth every 8 (eight) hours as needed for Pain., Disp: 40 tablet, Rfl: 0    rifAXIMin (XIFAXAN) 550 MG Oral Tab, 42 tablets (23,100 mg total)., Disp: , Rfl:     Ferrous Sulfate (IRON) 325 (65 Fe) MG Oral Tab, , Disp: , Rfl:   ASSESSMENT AND PLAN    1. Hearing loss, unspecified hearing loss type, unspecified laterality  I did recommend repeating hearing test in 6 months a year out from her previous study in June.  Will watch for any worsening asymmetry in the left ear and if noted I will recommend an MRI of the brain and IACs to rule out intracranial or retrocochlear process.    2. Otalgia, unspecified laterality  We once again discussed her issues with otalgia and discomfort I suspect this to be mostly musculoskeletal in nature.  I did ask her to continue with the warm heat and use of NSAIDs and perhaps even muscle relaxants as needed.  With  respect to the sound I suspect that this is simply due to her underlying issues with possible TMJ and turning the head in 1 direction or another can potentially lead to the perception of a song within the inner ear.  This is benign in nature and requires no further workup.        This note was prepared using Dragon [employer] voice recognition dictation software. As a result errors may occur. When identified these errors have been corrected. While every attempt is made to correct errors during dictation discrepancies may still exist    Joseluis Heard MD    12/7/2024    11:06 PM

## 2025-01-06 ENCOUNTER — OFFICE VISIT (OUTPATIENT)
Dept: ORTHOPEDICS CLINIC | Facility: CLINIC | Age: 70
End: 2025-01-06

## 2025-01-06 DIAGNOSIS — M75.21 BICEPS TENDINITIS OF RIGHT SHOULDER: ICD-10-CM

## 2025-01-06 DIAGNOSIS — M75.81 RIGHT ROTATOR CUFF TENDINITIS: Primary | ICD-10-CM

## 2025-01-06 DIAGNOSIS — M19.011 OSTEOARTHRITIS OF RIGHT ACROMIOCLAVICULAR JOINT: ICD-10-CM

## 2025-01-06 PROCEDURE — 99214 OFFICE O/P EST MOD 30 MIN: CPT

## 2025-01-06 NOTE — PROGRESS NOTES
NURSING INTAKE COMMENTS:   Chief Complaint   Patient presents with    Post-Op     S/p L upper arm mass excisional biopsy sx on 10/17/2024- rates pain 1-210 only when she pressed on it    Shoulder Pain     R shoulder -onset- 2024 - after the covid vaccine and flu shot- rates pain 1-810 depends w/ certain movements- has xray in the system       HPI: This 70 year old female presents today for new problem regarding her right arm.  Patient had an excision of a left arm mass in October, and is doing well.  She reports that she is healed very well and only has some irritation to the incision on her left arm when she touches it to put on lotion.  Other than that she has no issues with her left arm.  She reports that her right arm pain began in November when she got both the flu and COVID shot on the same day.  She started noticing difficulty moving her arm and has had problems since.  She reports that the problem was intermittent, but she has learned the things that irritate it so she tries not to do those certain movements.  Pain is worse when waking up and getting dressed.  She denies any numbness or tingling throughout her right upper extremity.  She has already had more than 4 weeks of physical therapy for her right shoulder.  She has not had any cortisone injections.    Past Medical History:    Anemia    Esophageal reflux    Hearing impaired person, bilateral    hearing lss left ear-  does have hearing aide left    Osteoporosis    Visual impairment    glasses     Past Surgical History:   Procedure Laterality Date          X4    Colonoscopy  2009    [facility]    Correct bunion,othr methods Left     D & c      Liberal teeth removed       Current Outpatient Medications   Medication Sig Dispense Refill    estradiol 0.1 MG/GM Vaginal Cream APPLY 2 GRAMS VAGINALLY 2 TIMES WEEKLY      ibuprofen 200 MG Oral Tab Take 3 tablets (600 mg total) by mouth every 8 (eight) hours as needed. Take with food. Stop  if stomach upset. 40 tablet 0    acetaminophen 500 MG Oral Tab Take 2 tablets (1,000 mg total) by mouth every 8 (eight) hours as needed for Pain. 40 tablet 0    rifAXIMin (XIFAXAN) 550 MG Oral Tab 42 tablets (23,100 mg total).      Ferrous Sulfate (IRON) 325 (65 Fe) MG Oral Tab        Allergies[1]  Family History   Problem Relation Age of Onset    Cancer Father         Leukemia    Other (Other) Father     Heart Disorder Mother     Diabetes Mother     Other (Other) Mother     Cancer Maternal Grandmother         breast cancer    Heart Disorder Maternal Grandfather     Cancer Paternal Grandfather         stomach cancer    Other (Other) Paternal Grandfather     Cancer Sister 50        breast, uterine cancer     No family Hx of DVT/PE    Social History     Occupational History    Not on file   Tobacco Use    Smoking status: Never     Passive exposure: Never    Smokeless tobacco: Never   Vaping Use    Vaping status: Never Used   Substance and Sexual Activity    Alcohol use: Yes     Comment: occ    Drug use: No    Sexual activity: Not on file        Review of Systems:  GENERAL: feels generally well, no significant weight loss or weight gain  SKIN: no ulcerated or worrisome skin lesions  EYES:denies blurred vision or double vision  HEENT: denies new nasal congestion, sinus pain or ST  LUNGS: denies shortness of breath  CARDIOVASCULAR: denies chest pain  GI: no hematemesis, no worsening heartburn, no diarrhea  : no dysuria, no blood in urine, no difficulty urinating, no incontinence  MUSCULOSKELETAL: no other musculoskeletal complaints other than in HPI  NEURO: no numbness or tingling, no weakness or balance disorder  PSYCHE: no depression or anxiety  HEMATOLOGIC: no hx of blood dyscrasia, no Hx DVT/PE  ENDOCRINE: no thyroid or diabetes issues  ALL/ASTHMA: no new hx of severe allergy or asthma    Physical Examination:    LMP  (LMP Unknown)   Constitutional: appears well hydrated, alert and responsive, no acute distress  noted  Extremities: No asymmetric warmth or swelling to right shoulder.  No scapular winging, biceps defect, or deltoid atrophy.  Musculoskeletal: Forward flexes 0 to 170 degrees, symmetrical to left shoulder.  Abducts to 80 degrees.  Externally rotates 50 degrees.  Brings right thumb to lower lumbar spine.  Tenderness to acromioclavicular joint, biceps tendon, and rotator cuff insertion.  Positive crossover test, biceps sign, and rotator cuff impingement.  Neurological: Motor and sensory function.    Imaging: X-rays taken on 11/27/2024 show no acute osseous injury.      No results found.     Lab Results   Component Value Date    WBC 4.5 10/06/2024    HGB 12.7 10/06/2024    .0 10/06/2024      Lab Results   Component Value Date    GLU 89 10/06/2024    BUN 14 10/06/2024    CREATSERUM 0.64 10/06/2024    GFRNAA 99 05/24/2022    GFRAA 114 05/24/2022        Assessment and Plan:  Diagnoses and all orders for this visit:    Right rotator cuff tendinitis  -     MRI SHOULDER, RIGHT (CPT=73221); Future    Osteoarthritis of right acromioclavicular joint  -     MRI SHOULDER, RIGHT (CPT=73221); Future    Biceps tendinitis of right shoulder  -     MRI SHOULDER, RIGHT (CPT=73221); Future        Assessment: As above    Plan: I discussed several options with the patient including living with it, continuing physical therapy, cortisone injections, and an MRI.  At this time the patient wants to continue working with her physical therapist as well as get an MRI.  She said that based on the MRI she would then decide if she wants an injection or not.  I agreed with this plan, since she has already had several weeks of physical therapy without any relief.    Order placed for right shoulder MRI.  Patient will follow-up in our office after the MRI for results and further recommendations.    Follow Up: No follow-ups on file.    BRITTANY Mccloud       [1]   Allergies  Allergen Reactions    Miconazole ITCHING     Burning and itching

## 2025-01-17 ENCOUNTER — HOSPITAL ENCOUNTER (OUTPATIENT)
Dept: MRI IMAGING | Facility: HOSPITAL | Age: 70
Discharge: HOME | End: 2025-01-17
Payer: MEDICARE

## 2025-01-17 DIAGNOSIS — M75.81 RIGHT ROTATOR CUFF TENDINITIS: ICD-10-CM

## 2025-01-17 DIAGNOSIS — M19.011 OSTEOARTHRITIS OF RIGHT ACROMIOCLAVICULAR JOINT: ICD-10-CM

## 2025-01-17 DIAGNOSIS — M75.21 BICEPS TENDINITIS OF RIGHT SHOULDER: ICD-10-CM

## 2025-01-17 PROCEDURE — 73221 MRI JOINT UPR EXTREM W/O DYE: CPT

## 2025-02-06 ENCOUNTER — OFFICE VISIT (OUTPATIENT)
Dept: AUDIOLOGY | Facility: CLINIC | Age: 70
End: 2025-02-06

## 2025-02-06 DIAGNOSIS — H90.3 ASYMMETRICAL SENSORINEURAL HEARING LOSS: Primary | ICD-10-CM

## 2025-02-06 PROCEDURE — 92590 HEARING AID EXAM, ONE EAR: CPT | Performed by: AUDIOLOGIST

## 2025-02-06 NOTE — PROGRESS NOTES
HEARING AID EVALUATION    Matilde Connor is a 70 year old female     Referring Provider: Joseluis Heard MD  YOB: 1955  Medical Record: [id number]    Hearing Aid Prescription Date of Issue: 2/6/2025  Hearing Aid Prescription Date of Expiration: 2/6/2026    Patient History of Hearing Loss:   Patient seen today to discuss options for hearing aids. History of bilateral hearing loss, left poorer than right. She also hase bothersome tinnitus in her left ear. She currently is in the trial period using a Jabra hearing aid purchased at [facility]. She was fit monaurally on the left ear only.  She has not been satisfied with the sound quality and is not receiving much benefit. She is considering obtaining hearing aids at our facility and would like to discuss options.     Test Results:   See audiogram for air and bone conduction thresholds, recorded speech in quiet and noise.    Quick SIN (Speech In Noise Test) was completed through insert earphones bilaterally with a score of  1.5         Signal-to-Noise Ratio (SNR) loss.     The following table  shows scoring for this test and gives an indication of how well this person would be expected to hear in a background of noise.    0 - 3 dB SNR-Loss              Normal  4 - 7 dB SNR-Loss              Mild  8 - 15 dB SNR Loss           Moderate  >15 dB SNR Loss               Severe    Hearing Aid Selection:    Based on the audiometric test results, patient perception of hearing difficulty, and patient lifestyle the following hearing aid(s) and features are recommended as medically necessary to improve the patient's ability to hear speech sounds in a variety of listening environments.        Type/Style of hearing aid recommended:   SHARONDA ( in canal with open/closed/power domes)        Recommended Features of hearing aid:    Chosen features in bold  Rechargeable      Bluetooth Connectivity  Spheric Speech Clarity (90 and 70 only)  Speech Enhancer (90  only)  Dynamic Noise Cancellation  Active Feedback management  Frequency Lowering Capabilities  Echoblock/Windblock technology  Tinnitus Balance capabilities  Automatic steering between listening environments.     Ultra Zoom  Motion Sensor Hearing  20   Channels    Hearing aid charges and policies were discussed with patient including warranty information, trial period with amplification, benefits/limitations of current technology.  Realistic expectations and acclimating to new sounds was also discussed.          Hearing Aid Selection:  : Phonak  Model: TBD  Color Choice: P3  Wire length right:#1  Wire length left: #!   Power right: Moderate   Power left: Moderate  Technology Level Choice: TBD    Insurance coverage:   No Insurance Benefits      Hearing aid dispensing appointment was scheduled for: TBD  Hearing aid follow up appointment was scheduled for: TBD    Matilde would like to think about technology levels and will contact the clinic with her decision.     Audiologist:  Francisco Ojeda  Audiology License Number: [id number]

## 2025-05-16 ENCOUNTER — OFFICE VISIT (OUTPATIENT)
Dept: FAMILY MEDICINE CLINIC | Facility: CLINIC | Age: 70
End: 2025-05-16
Payer: MEDICARE

## 2025-05-16 VITALS
RESPIRATION RATE: 16 BRPM | BODY MASS INDEX: 23.52 KG/M2 | DIASTOLIC BLOOD PRESSURE: 60 MMHG | WEIGHT: 127.81 LBS | OXYGEN SATURATION: 97 % | HEIGHT: 62 IN | SYSTOLIC BLOOD PRESSURE: 90 MMHG | HEART RATE: 61 BPM | TEMPERATURE: 99 F

## 2025-05-16 DIAGNOSIS — M75.81 RIGHT ROTATOR CUFF TENDINITIS: ICD-10-CM

## 2025-05-16 DIAGNOSIS — E78.00 HYPERCHOLESTEROLEMIA: ICD-10-CM

## 2025-05-16 DIAGNOSIS — E61.1 IRON DEFICIENCY: ICD-10-CM

## 2025-05-16 DIAGNOSIS — M81.0 AGE-RELATED OSTEOPOROSIS WITHOUT CURRENT PATHOLOGICAL FRACTURE: ICD-10-CM

## 2025-05-16 DIAGNOSIS — D72.819 LEUKOPENIA, UNSPECIFIED TYPE: ICD-10-CM

## 2025-05-16 DIAGNOSIS — Z00.00 ENCOUNTER FOR ANNUAL HEALTH EXAMINATION: Primary | ICD-10-CM

## 2025-05-16 DIAGNOSIS — H91.90 HEARING LOSS, UNSPECIFIED HEARING LOSS TYPE, UNSPECIFIED LATERALITY: ICD-10-CM

## 2025-05-16 DIAGNOSIS — K20.80 LYMPHOCYTIC ESOPHAGITIS: ICD-10-CM

## 2025-05-16 PROBLEM — H01.029 SQUAMOUS BLEPHARITIS: Status: ACTIVE | Noted: 2025-05-16

## 2025-05-16 PROBLEM — H10.30 ACUTE CONJUNCTIVITIS: Status: ACTIVE | Noted: 2025-05-16

## 2025-05-16 PROBLEM — T15.00XA CORNEAL FOREIGN BODY: Status: ACTIVE | Noted: 2025-05-16

## 2025-05-16 RX ORDER — MUPIROCIN 20 MG/G
OINTMENT TOPICAL AS NEEDED
COMMUNITY
Start: 2025-04-01

## 2025-05-16 RX ORDER — ZOLEDRONIC ACID 0.05 MG/ML
5 INJECTION, SOLUTION INTRAVENOUS
COMMUNITY

## 2025-05-16 NOTE — PROGRESS NOTES
Subjective:   Matilde Connor is a 70 year old female who presents for a Subsequent Annual Wellness visit (Pt already had Initial Annual Wellness) and scheduled follow up of multiple significant but stable problems.   History of Present Illness            70-year-old female coming in for her routine Medicare physical.  Follows up with GI Dr. Hi given history of lymphocytic esophagitis.  Saw an allergist.  No food allergies noted but continues to follow-up with them.  Up-to-date on colonoscopy. No longer on Pantoprazole.  Follows up with ENT Dr. Heard for bilateral hearing loss L>R.  She got hearing aids from [employer] however did not notice a significant improvement and followed up with her audiologist for additional advice/recommendations.   Follows up with GYN Dr. Watkins at [facility] for female health matters.  On vaginal estrogen cream.  Follows up with endocrinology Dr. Bates for osteoporosis, was on alendronate,  Now switched to Reclast.  She has a follow-up on 7/2/25.   Followed up with hematology due to mild leukopenia.  Cleared at this time. Has labs pending.  Follows up with ophthalmology Dr. Frazier for cataract and vision checks.  Follows up with Ortho Dr. [facility].  History of right hip labrum tear treated with PRP and left hamstring partial tear status post cold laser therapy.    No acute complaints.  Patient would like extension of her physical therapy referral for right shoulder as she noted improvement.     History/Other:   Fall Risk Assessment:   She has been screened for Falls and is High Risk. Fall Prevention information provided to patient in After Visit Summary.    Do you feel unsteady when standing or walking?: No  Do you worry about falling?: Yes  Have you fallen in the past year?: No     Cognitive Assessment:   She had a completely normal cognitive assessment - see flowsheet entries     Functional Ability/Status:   Matilde Connor has some abnormal functions  as listed below:  She has Hearing problems based on screening of functional status.      Depression Screening (PHQ):  PHQ-2 SCORE: 1  , done 2025   Feeling down, depressed, or hopeless: 1    If you checked off any problems, how difficult have these problems made it for you to do your work, take care of things at home, or get along with other people?: Somewhat difficult             Advanced Directives:   She does NOT have a Living Will. [Do you have a living will?: No]  She does NOT have a Power of  for Health Care. [Do you have a healthcare power of ?: No]  Discussed Advance Care Planning with patient (and family/surrogate if present). Standard forms made available to patient in After Visit Summary.      Problem List[1]  Allergies:  She is allergic to miconazole.    Current Medications:  Active Meds, Sig Only[2]    Medical History:  She  has a past medical history of Anemia, Esophageal reflux, Hearing impaired person, bilateral, Osteoporosis, and Visual impairment.  Surgical History:  She  has a past surgical history that includes ; d & c; wisdom teeth removed; colonoscopy (2009); and correct bunion,othr methods (Left).   Family History:  Her family history includes Cancer in her father, maternal grandmother, and paternal grandfather; Cancer (age of onset: 50) in her sister; Diabetes in her mother; Heart Disorder in her maternal grandfather and mother; Other in her father, mother, and paternal grandfather.  Social History:  She  reports that she has never smoked. She has never been exposed to tobacco smoke. She has never used smokeless tobacco. She reports current alcohol use. She reports that she does not use drugs.    Tobacco:  She has never smoked tobacco.    CAGE Alcohol Screen:   CAGE screening score of 0 on 2025, showing low risk of alcohol abuse.      Patient Care Team:  Rustam Elaine MD as PCP - General  Joseluis Heard MD (OTOLARYNGOLOGY)    Review of Systems    Constitutional:  Negative for chills, diaphoresis and fever.   HENT:  Negative for congestion, ear discharge, ear pain, sinus pressure, sinus pain and sore throat.    Eyes:  Negative for pain and discharge.   Respiratory:  Negative for cough, chest tightness, shortness of breath and wheezing.    Cardiovascular:  Negative for chest pain and palpitations.   Gastrointestinal:  Negative for abdominal pain, diarrhea, nausea and vomiting.   Endocrine: Negative for cold intolerance and heat intolerance.   Genitourinary:  Negative for dysuria, flank pain, frequency and urgency.   Musculoskeletal:  Negative for joint swelling.        Right shoulder discomfort.   Skin:  Negative for rash.   Neurological:  Negative for dizziness, syncope and headaches.   Psychiatric/Behavioral:  Negative for confusion and hallucinations.          Objective:   Physical Exam  Constitutional:       General: She is not in acute distress.     Appearance: Normal appearance. She is not ill-appearing or toxic-appearing.   HENT:      Head: Normocephalic and atraumatic.      Right Ear: Tympanic membrane and ear canal normal.      Left Ear: Tympanic membrane and ear canal normal.      Mouth/Throat:      Mouth: Mucous membranes are moist.      Pharynx: Oropharynx is clear. No oropharyngeal exudate or posterior oropharyngeal erythema.   Eyes:      Extraocular Movements: Extraocular movements intact.      Pupils: Pupils are equal, round, and reactive to light.   Cardiovascular:      Rate and Rhythm: Normal rate and regular rhythm.      Heart sounds: Normal heart sounds. No murmur heard.     No gallop.   Pulmonary:      Effort: Pulmonary effort is normal. No respiratory distress.      Breath sounds: Normal breath sounds. No stridor. No wheezing, rhonchi or rales.   Abdominal:      General: Bowel sounds are normal.      Palpations: Abdomen is soft.      Tenderness: There is no abdominal tenderness. There is no right CVA tenderness, left CVA tenderness or  guarding.   Musculoskeletal:         General: No swelling.      Cervical back: Normal range of motion and neck supple. No rigidity or tenderness.      Right lower leg: No edema.      Left lower leg: No edema.   Skin:     General: Skin is warm and dry.   Neurological:      General: No focal deficit present.      Mental Status: She is alert and oriented to person, place, and time. Mental status is at baseline.      Cranial Nerves: No cranial nerve deficit.      Sensory: No sensory deficit.      Motor: Motor function is intact. No weakness.      Gait: Gait normal.   Psychiatric:         Mood and Affect: Mood normal.         Behavior: Behavior normal.         Thought Content: Thought content normal.         Judgment: Judgment normal.           BP 90/60 (BP Location: Left arm, Patient Position: Sitting, Cuff Size: adult)   Pulse 61   Temp 98.6 °F (37 °C) (Temporal)   Resp 16   Ht 5' 2\" (1.575 m)   Wt 127 lb 12.8 oz (58 kg)   LMP  (LMP Unknown)   SpO2 97%   BMI 23.37 kg/m²  Estimated body mass index is 23.37 kg/m² as calculated from the following:    Height as of this encounter: 5' 2\" (1.575 m).    Weight as of this encounter: 127 lb 12.8 oz (58 kg).    Medicare Hearing Assessment:   Hearing Screening    Time taken: 5/16/2025 10:11 AM  Screening Method: Finger Rub  Finger Rub Result: Pass         Visual Acuity:   Right Eye Visual Acuity: Corrected Right Eye Chart Acuity: 20/40   Left Eye Visual Acuity: Corrected Left Eye Chart Acuity: 20/30   Both Eyes Visual Acuity: Corrected Both Eyes Chart Acuity: 20/30   Able To Tolerate Visual Acuity: Yes        Assessment & Plan:   Matilde Connor is a 70 year old female who presents for a Medicare Assessment.     1. Encounter for annual health examination (Primary)  -Healthy diet and lifestyle.  -Exercise as tolerated.  -Dental exam every 6 months or as recommended by dentist.  -Eye exams annually, at least every 2 years or as recommended by specialist.  -     CBC,  Platelet; No Differential; Future; Expected date: 05/16/2025  -     Comp Metabolic Panel (14); Future; Expected date: 05/16/2025  -     Hemoglobin A1C; Future; Expected date: 05/16/2025  -     Lipid Panel; Future; Expected date: 05/16/2025  -     TSH W Reflex To Free T4; Future; Expected date: 05/16/2025  2. Hypercholesterolemia  The patient's ASCVD 10 year risk score is 3.5.  -Healthy diet and lifestyle.  -Exercise as tolerated.  -     Lipid Panel; Future; Expected date: 05/16/2025  3. Leukopenia, unspecified type  Stable and cleared by hematology.  -     CBC, Platelet; No Differential; Future; Expected date: 05/16/2025  4. Age-related osteoporosis without current pathological fracture  Now on Reclast.  - Continue follow-up with endocrinology.  -     Vitamin D; Future; Expected date: 05/16/2025  5. Hearing loss, unspecified hearing loss type, unspecified laterality  -Continue follow-up with ENT and audiology.  6. Lymphocytic esophagitis  -Continue follow-up with GI.  7. Right rotator cuff tendinitis  Will extend PT per patient request.  -     Physical Therapy Referral - External  8. Iron deficiency  -     CBC, Platelet; No Differential; Future; Expected date: 05/16/2025  -     Iron And Tibc; Future; Expected date: 05/16/2025  -     Ferritin; Future; Expected date: 05/16/2025            The patient indicates understanding of these issues and agrees to the plan.  Lab work ordered.  Reinforced healthy diet, lifestyle, and exercise.      Return in about 1 year (around 5/16/2026) for Medicare physical.     Rustam Elaine MD, 5/16/2025     Supplementary Documentation:   General Health:  In the past six months, have you lost more than 10 pounds without trying?: 2 - No  Has your appetite been poor?: No  Type of Diet: Balanced  How does the patient maintain a good energy level?: Stretching, Daily Walks  How would you describe your daily physical activity?: Light  How would you describe your current health state?: Fair  How  do you maintain positive mental well-being?: Social Interaction, Visiting Family, Puzzles, Games, Visiting Friends  On a scale of 0 to 10, with 0 being no pain and 10 being severe pain, what is your pain level?: 3 - (Mild) (shoulder right)  In the past six months, have you experienced urine leakage?: 1-Yes  At any time do you feel concerned for the safety/well-being of yourself and/or your children, in your home or elsewhere?: No  Have you had any immunizations at another office such as Influenza, Hepatitis B, Tetanus, or Pneumococcal?: No    Health Maintenance   Topic Date Due    Zoster Vaccines (1 of 2) Never done    Annual Depression Screening  01/01/2025    Fall Risk Screening (Annual)  01/01/2025    COVID-19 Vaccine (7 - 2024-25 season) 05/16/2025    Annual Physical  05/17/2025    Mammogram  04/06/2026    Colorectal Cancer Screening  11/08/2029    Influenza Vaccine  Completed    DEXA Scan  Completed    Pneumococcal Vaccine: 50+ Years  Completed    Meningococcal B Vaccine  Aged Out            [1]   Patient Active Problem List  Diagnosis    Routine medical exam    Urinary urgency    Postmenopausal    SNHL (sensory-neural hearing loss), asymmetrical    Low ferritin    Neck pain    Keratoconjunctivitis sicca, not specified as Sjogren's    Nuclear senile cataract    Vaginal atrophy    Osteoporosis    Age-related osteoporosis without current pathological fracture    Labral tear of hip joint    Myofascial pain    Urge incontinence    Urgency-frequency syndrome    Diarrhea, unspecified    Lymphocytic esophagitis    Partial hamstring tear    Mass of arm, left    Acute conjunctivitis    Corneal foreign body    Squamous blepharitis   [2]   Outpatient Medications Marked as Taking for the 5/16/25 encounter (Office Visit) with Rustam Elaine MD   Medication Sig    Ascorbic Acid 500 MG Oral Cap Take 500 mg by mouth.    mupirocin 2 % External Ointment Apply topically as needed.    estradiol 0.1 MG/GM Vaginal Cream APPLY 2  GRAMS VAGINALLY 2 TIMES WEEKLY    ibuprofen 200 MG Oral Tab Take 3 tablets (600 mg total) by mouth every 8 (eight) hours as needed. Take with food. Stop if stomach upset.    acetaminophen 500 MG Oral Tab Take 2 tablets (1,000 mg total) by mouth every 8 (eight) hours as needed for Pain.    Ferrous Sulfate (IRON) 325 (65 Fe) MG Oral Tab

## 2025-06-20 ENCOUNTER — OFFICE VISIT (OUTPATIENT)
Dept: AUDIOLOGY | Facility: CLINIC | Age: 70
End: 2025-06-20

## 2025-06-20 ENCOUNTER — OFFICE VISIT (OUTPATIENT)
Dept: OTOLARYNGOLOGY | Facility: CLINIC | Age: 70
End: 2025-06-20

## 2025-06-20 VITALS — HEIGHT: 62 IN | BODY MASS INDEX: 23 KG/M2 | WEIGHT: 125 LBS

## 2025-06-20 DIAGNOSIS — H91.90 HEARING LOSS, UNSPECIFIED HEARING LOSS TYPE, UNSPECIFIED LATERALITY: Primary | ICD-10-CM

## 2025-06-20 DIAGNOSIS — H90.3 ASYMMETRIC SNHL (SENSORINEURAL HEARING LOSS): Primary | ICD-10-CM

## 2025-06-20 PROCEDURE — 99214 OFFICE O/P EST MOD 30 MIN: CPT | Performed by: OTOLARYNGOLOGY

## 2025-06-20 PROCEDURE — 92557 COMPREHENSIVE HEARING TEST: CPT | Performed by: AUDIOLOGIST

## 2025-06-20 PROCEDURE — 92567 TYMPANOMETRY: CPT | Performed by: AUDIOLOGIST

## 2025-06-20 NOTE — PROGRESS NOTES
Matilde Connor is a 70 year old female.    Chief Complaint   Patient presents with    Follow - Up     hearing loss, unspecified hearing loss type, unspecified laterality       HISTORY OF PRESENT ILLNESS  She presents with a 3 to 4-month history of decreased hearing on the left.  She states that this came on suddenly without any associated symptoms of vertigo or tinnitus.  She denies having any type of acute viral or upper respiratory tract infection during the onset of this hearing loss.  She states that she was lying in bed and turned in one direction and realized that she cannot hear very well from the other side.  No changes in hearing since then.  Took some time before she had an audiogram which was performed on January 6 of 2020 with a finding of normal downsloping to mild sensorineural hearing loss on the right with mild downsloping to moderate sensorineural hearing loss on the left.  Speech is clear discrimination scores are essentially 100% bilaterally and tympanograms are normal bilaterally.  No other signs, symptoms or complaints.  No history of diabetes stroke or hypertension.     1/28/20 last visit she was started on prednisone burst and taper.  She notes no change in her hearing although she does have some perceptual changes with the ear feeling less full at times.  No other new signs, symptoms or complaints.  Audiogram was repeated today demonstrating no change in her pure-tone thresholds on the left.     6/4/2020 she presents today with exacerbation of her tinnitus.  She feels that the tinnitus that she has had primarily on the left side is now louder.  She does admit to having significant stress and anxiety due to the current pandemic and the newer race issues occurring in the [facility].  She states that these issues with tinnitus started around the time of the onset of the pandemic.  She does describe herself as being very worried about things.  Audiogram performed today demonstrates no  improvement in her hearing with stable pure-tone thresholds at all frequencies since she originally had a problem in early January.  I did recommend an E BR versus an MRI to rule out a retrocochlear process and she chose to do an ABR.  ABR performed in January 2020 demonstrates normal potentials bilaterally.  No evidence of a retrocochlear abnormality.  Her previous studies and this recent ABR results were discussed at length today.     4/28/22 I last saw her 2 years ago and at that time she had a normal ABR with a known history of hearing loss on the left.  Does not use amplification in that ear as she feels that she hears reasonably well on the other side.  Does complain of statically sound on the left side.  This is constant and is better when there is any sounds present.  She does feel that her hearing has decreased on the left side as she is asking for things to be repeated more frequently and also notes that she cannot understand certain people as she feels that they are not articulating as well as they should.  Audiogram was performed today based on her complaints with a finding of progressive sensorineural hearing loss at the mid and high frequencies only on the left.  Right is stable.  Here to discuss further management     6/17/22 she presents today with a sensation of decreased hearing on the left.  Previously noted to have normal ABR and more recently had an MRI that showed no abnormalities.  Repeat audiogram was performed based on her concerns of decreased hearing which showed no changes at any frequencies on the left.  Speech discrimination scores are unchanged from last study.  She does have a history of TMJ she is not sure to see if she has been clenching or grinding more recently.     6/16/23 doing well no new complaints or concerns here for routine hearing test.  Previous MRI of the brain reveals no abnormalities previous ABR suggest no retrocochlear abnormalities.  Audiogram performed today  demonstrates essentially unchanged hearing with mild downsloping to moderate sensory hearing loss on the left with normal hearing at the low and mid frequencies on the right and a mild high-frequency loss with normal tympanograms and speech discrimination score      6/18/24 she feels that her hearing is worsened.  Now noticing more frequently that she is having difficulty hearing people.  Previous audiogram demonstrated normal downsloping to mild hearing loss on the right and mild downsloping to moderate hearing loss on the left with normal speech discrimination scores and tympanograms.  Also complains of some fullness in her left ear with itchiness and a sensation of something crawling in her ears at times.  She does grind her teeth and will be getting a bite guard     12/3/24 she presents today with concerns about a sound that she hears in her ear when she turns her head in 1 direction.  I have suspected her of having TMJ issues causing her to have otologic signs and symptoms.  Since I last saw her she was supposed to be getting a bite guard.  Known history of asymmetric hearing loss on the left with normal downsloping to moderate hearing loss on the right and mild downsloping to severe sensorineural hearing loss on the left.  She does not feel any significant change in her hearing since I last saw her 6 months ago.  Scheduled to have a repeat audiogram in June of next year.  No other new signs, symptoms or complaints.     6/20/25 Long complicated history of hearing issues left greater than right.  Feels that her hearing has been somewhat stable.  Does report ear pressure and at times discomfort. Audiogram reveals stable hearing bilaterally with asymmetric SNHL left greater than right.       Social Hx on file[1]    Family History[2]    Past Medical History[3]    Past Surgical History[4]      REVIEW OF SYSTEMS    System Neg/Pos Details   Constitutional Negative Fatigue, fever and weight loss.   ENMT Negative  Drooling.   Eyes Negative Blurred vision and vision changes.   Respiratory Negative Dyspnea and wheezing.   Cardio Negative Chest pain, irregular heartbeat/palpitations and syncope.   GI Negative Abdominal pain and diarrhea.   Endocrine Negative Cold intolerance and heat intolerance.   Neuro Negative Tremors.   Psych Negative Anxiety and depression.   Integumentary Negative Frequent skin infections, pigment change and rash.   Hema/Lymph Negative Easy bleeding and easy bruising.           PHYSICAL EXAM    Ht 5' 2\" (1.575 m)   Wt 125 lb (56.7 kg)   LMP  (LMP Unknown)   BMI 22.86 kg/m²        Constitutional Normal Overall appearance - Normal.   Psychiatric Normal Orientation - Oriented to time, place, person & situation. Appropriate mood and affect.   Neck Exam Normal Inspection - Normal. Palpation - Normal. Parotid gland - Normal. Thyroid gland - Normal.   Eyes Normal Conjunctiva - Right: Normal, Left: Normal. Pupil - Right: Normal, Left: Normal. Fundus - Right: Normal, Left: Normal.   Neurological Normal Memory - Normal. Cranial nerves - Cranial nerves II through XII grossly intact.   Head/Face Normal Facial features - Normal. Eyebrows - Normal. Skull - Normal.        Nasopharynx Normal External nose - Normal. Lips/teeth/gums - Normal. Tonsils - Normal. Oropharynx - Normal.   Ears Normal Inspection - Right: Normal, Left: Normal. Canal - Right: Normal, Left: Normal. TM - Right: Normal, Left: Normal.   Skin Normal Inspection - Normal.        Lymph Detail Normal Submental. Submandibular. Anterior cervical. Posterior cervical. Supraclavicular.        Nose/Mouth/Throat Normal External nose - Normal. Lips/teeth/gums - Normal. Tonsils - Normal. Oropharynx - Normal.   Nose/Mouth/Throat Normal Nares - Right: Normal Left: Normal. Septum -Normal  Turbinates - Right: Normal, Left: Normal.     Medications - Current[5]  ASSESSMENT AND PLAN    1. Hearing loss, unspecified hearing loss type, unspecified laterality  Stable hearing  loss on the left side and the right side.  At this time I did recommend repeating hearing test in about 1 year unless she has any new signs or symptoms.  Long detailed conversation regarding her history of fluctuating hearing loss.  No evidence of fluctuations recently.  We did discuss the possibility of a Ménière's versus an autoimmune cause of her fluctuations on the left side.  She return to see me in 1 year for repeat audiogram.        This note was prepared using [employer] voice recognition dictation software. As a result errors may occur. When identified these errors have been corrected. While every attempt is made to correct errors during dictation discrepancies may still exist    Joseluis Heard MD    2025    5:55 PM         [1]   Social History  Socioeconomic History    Marital status:    Tobacco Use    Smoking status: Never     Passive exposure: Never    Smokeless tobacco: Never   Vaping Use    Vaping status: Never Used   Substance and Sexual Activity    Alcohol use: Yes     Comment: occ    Drug use: No   Other Topics Concern    Caffeine Concern No    Exercise Yes    Seat Belt Yes    Special Diet No    Stress Concern No    Weight Concern Yes     Comment: Gaining   [2]   Family History  Problem Relation Age of Onset    Cancer Father         Leukemia    Other (Other) Father     Heart Disorder Mother     Diabetes Mother     Other (Other) Mother     Cancer Maternal Grandmother         breast cancer    Heart Disorder Maternal Grandfather     Cancer Paternal Grandfather         stomach cancer    Other (Other) Paternal Grandfather     Cancer Sister 50        breast, uterine cancer   [3]   Past Medical History:   Anemia    Esophageal reflux    Hearing impaired person, bilateral    hearing lss left ear-  does have hearing aide left    Osteoporosis    Visual impairment    glasses   [4]   Past Surgical History:  Procedure Laterality Date          X4    Colonoscopy  2009    [facility]     Correct bunion,othr methods Left     D & c      [place] teeth removed     [5]   Current Outpatient Medications:     Ascorbic Acid 500 MG Oral Cap, Take 500 mg by mouth., Disp: , Rfl:     mupirocin 2 % External Ointment, Apply topically as needed., Disp: , Rfl:     estradiol 0.1 MG/GM Vaginal Cream, APPLY 2 GRAMS VAGINALLY 2 TIMES WEEKLY, Disp: , Rfl:     ibuprofen 200 MG Oral Tab, Take 3 tablets (600 mg total) by mouth every 8 (eight) hours as needed. Take with food. Stop if stomach upset., Disp: 40 tablet, Rfl: 0    acetaminophen 500 MG Oral Tab, Take 2 tablets (1,000 mg total) by mouth every 8 (eight) hours as needed for Pain., Disp: 40 tablet, Rfl: 0    Ferrous Sulfate (IRON) 325 (65 Fe) MG Oral Tab, , Disp: , Rfl:     zoledronic acid 5 mg/100mL Intravenous Solution, Inject 100 mL (5 mg total) into the vein. Every 6 Months (Patient not taking: Reported on 6/20/2025), Disp: , Rfl:     rifAXIMin (XIFAXAN) 550 MG Oral Tab, 42 tablets (23,100 mg total). (Patient not taking: Reported on 6/20/2025), Disp: , Rfl:

## 2025-07-29 ENCOUNTER — OFFICE VISIT (OUTPATIENT)
Dept: INTEGRATIVE MEDICINE | Facility: CLINIC | Age: 70
End: 2025-07-29
Payer: MEDICARE

## 2025-07-29 VITALS
HEIGHT: 62 IN | WEIGHT: 127.19 LBS | HEART RATE: 64 BPM | SYSTOLIC BLOOD PRESSURE: 104 MMHG | BODY MASS INDEX: 23.4 KG/M2 | OXYGEN SATURATION: 97 % | DIASTOLIC BLOOD PRESSURE: 60 MMHG

## 2025-07-29 DIAGNOSIS — M81.0 OSTEOPOROSIS, UNSPECIFIED OSTEOPOROSIS TYPE, UNSPECIFIED PATHOLOGICAL FRACTURE PRESENCE: ICD-10-CM

## 2025-07-29 DIAGNOSIS — M54.89 OTHER ACUTE BACK PAIN: ICD-10-CM

## 2025-07-29 DIAGNOSIS — R14.0 ABDOMINAL BLOATING: Primary | ICD-10-CM

## 2025-07-29 DIAGNOSIS — R79.89 LOW VITAMIN D LEVEL: ICD-10-CM

## 2025-07-29 DIAGNOSIS — K92.9 DIGESTIVE PROBLEMS: ICD-10-CM

## 2025-07-29 DIAGNOSIS — R63.5 WEIGHT GAIN: ICD-10-CM

## 2025-07-29 PROCEDURE — 99215 OFFICE O/P EST HI 40 MIN: CPT | Performed by: PHYSICIAN ASSISTANT

## 2025-07-29 PROCEDURE — G2211 COMPLEX E/M VISIT ADD ON: HCPCS | Performed by: PHYSICIAN ASSISTANT

## 2025-08-04 ENCOUNTER — NURSE TRIAGE (OUTPATIENT)
Dept: FAMILY MEDICINE CLINIC | Facility: CLINIC | Age: 70
End: 2025-08-04

## 2025-08-04 ENCOUNTER — APPOINTMENT (OUTPATIENT)
Dept: GENERAL RADIOLOGY | Facility: HOSPITAL | Age: 70
End: 2025-08-04
Attending: EMERGENCY MEDICINE

## 2025-08-04 ENCOUNTER — HOSPITAL ENCOUNTER (EMERGENCY)
Facility: HOSPITAL | Age: 70
Discharge: HOME | End: 2025-08-04
Attending: EMERGENCY MEDICINE

## 2025-08-04 VITALS
BODY MASS INDEX: 22.08 KG/M2 | OXYGEN SATURATION: 97 % | HEART RATE: 57 BPM | HEIGHT: 62 IN | TEMPERATURE: 98 F | SYSTOLIC BLOOD PRESSURE: 109 MMHG | DIASTOLIC BLOOD PRESSURE: 65 MMHG | WEIGHT: 120 LBS | RESPIRATION RATE: 18 BRPM

## 2025-08-04 DIAGNOSIS — K20.90 ESOPHAGITIS: Primary | ICD-10-CM

## 2025-08-04 LAB
ALBUMIN SERPL-MCNC: 4.4 G/DL (ref 3.2–4.8)
ALP LIVER SERPL-CCNC: 46 U/L (ref 55–142)
ALT SERPL-CCNC: 17 U/L (ref 10–49)
ANION GAP SERPL CALC-SCNC: 7 MMOL/L (ref 0–18)
AST SERPL-CCNC: 20 U/L (ref ?–34)
BASOPHILS # BLD AUTO: 0.05 X10(3) UL (ref 0–0.2)
BASOPHILS NFR BLD AUTO: 1 %
BILIRUB DIRECT SERPL-MCNC: 0.2 MG/DL (ref ?–0.3)
BILIRUB SERPL-MCNC: 0.9 MG/DL (ref 0.2–1.1)
BUN BLD-MCNC: 15 MG/DL (ref 9–23)
BUN/CREAT SERPL: 25.4 (ref 10–20)
CALCIUM BLD-MCNC: 9.4 MG/DL (ref 8.7–10.4)
CHLORIDE SERPL-SCNC: 106 MMOL/L (ref 98–112)
CO2 SERPL-SCNC: 28 MMOL/L (ref 21–32)
CREAT BLD-MCNC: 0.59 MG/DL (ref 0.55–1.02)
D DIMER PPP FEU-MCNC: <0.27 UG/ML FEU (ref ?–0.68)
DEPRECATED RDW RBC AUTO: 41.9 FL (ref 35.1–46.3)
EGFRCR SERPLBLD CKD-EPI 2021: 98 ML/MIN/1.73M2 (ref 60–?)
EOSINOPHIL # BLD AUTO: 0.09 X10(3) UL (ref 0–0.7)
EOSINOPHIL NFR BLD AUTO: 1.7 %
ERYTHROCYTE [DISTWIDTH] IN BLOOD BY AUTOMATED COUNT: 12.3 % (ref 11–15)
GLUCOSE BLD-MCNC: 91 MG/DL (ref 70–99)
HCT VFR BLD AUTO: 33.7 % (ref 35–48)
HGB BLD-MCNC: 11.5 G/DL (ref 12–16)
IMM GRANULOCYTES # BLD AUTO: 0 X10(3) UL (ref 0–1)
IMM GRANULOCYTES NFR BLD: 0 %
LIPASE SERPL-CCNC: 41 U/L (ref 12–53)
LYMPHOCYTES # BLD AUTO: 1.98 X10(3) UL (ref 1–4)
LYMPHOCYTES NFR BLD AUTO: 37.6 %
MCH RBC QN AUTO: 31.5 PG (ref 26–34)
MCHC RBC AUTO-ENTMCNC: 34.1 G/DL (ref 31–37)
MCV RBC AUTO: 92.3 FL (ref 80–100)
MONOCYTES # BLD AUTO: 0.56 X10(3) UL (ref 0.1–1)
MONOCYTES NFR BLD AUTO: 10.6 %
NEUTROPHILS # BLD AUTO: 2.58 X10 (3) UL (ref 1.5–7.7)
NEUTROPHILS # BLD AUTO: 2.58 X10(3) UL (ref 1.5–7.7)
NEUTROPHILS NFR BLD AUTO: 49.1 %
OSMOLALITY SERPL CALC.SUM OF ELEC: 292 MOSM/KG (ref 275–295)
PLATELET # BLD AUTO: 254 10(3)UL (ref 150–450)
POTASSIUM SERPL-SCNC: 4 MMOL/L (ref 3.5–5.1)
PROT SERPL-MCNC: 6.9 G/DL (ref 5.7–8.2)
RBC # BLD AUTO: 3.65 X10(6)UL (ref 3.8–5.3)
SODIUM SERPL-SCNC: 141 MMOL/L (ref 136–145)
TROPONIN I SERPL HS-MCNC: 3 NG/L (ref ?–34)
WBC # BLD AUTO: 5.3 X10(3) UL (ref 4–11)

## 2025-08-04 PROCEDURE — 99284 EMERGENCY DEPT VISIT MOD MDM: CPT

## 2025-08-04 PROCEDURE — 93010 ELECTROCARDIOGRAM REPORT: CPT

## 2025-08-04 PROCEDURE — 80076 HEPATIC FUNCTION PANEL: CPT | Performed by: EMERGENCY MEDICINE

## 2025-08-04 PROCEDURE — 85025 COMPLETE CBC W/AUTO DIFF WBC: CPT | Performed by: EMERGENCY MEDICINE

## 2025-08-04 PROCEDURE — 85379 FIBRIN DEGRADATION QUANT: CPT | Performed by: EMERGENCY MEDICINE

## 2025-08-04 PROCEDURE — 84484 ASSAY OF TROPONIN QUANT: CPT | Performed by: EMERGENCY MEDICINE

## 2025-08-04 PROCEDURE — 71045 X-RAY EXAM CHEST 1 VIEW: CPT | Performed by: EMERGENCY MEDICINE

## 2025-08-04 PROCEDURE — 93005 ELECTROCARDIOGRAM TRACING: CPT

## 2025-08-04 PROCEDURE — 83690 ASSAY OF LIPASE: CPT | Performed by: EMERGENCY MEDICINE

## 2025-08-04 PROCEDURE — 96374 THER/PROPH/DIAG INJ IV PUSH: CPT

## 2025-08-04 PROCEDURE — 80048 BASIC METABOLIC PNL TOTAL CA: CPT | Performed by: EMERGENCY MEDICINE

## 2025-08-04 RX ORDER — LIDOCAINE HYDROCHLORIDE 20 MG/ML
10 SOLUTION OROPHARYNGEAL ONCE
Status: COMPLETED | OUTPATIENT
Start: 2025-08-04 | End: 2025-08-04

## 2025-08-04 RX ORDER — PANTOPRAZOLE SODIUM 40 MG/1
40 TABLET, DELAYED RELEASE ORAL DAILY
Qty: 30 TABLET | Refills: 0 | Status: SHIPPED | OUTPATIENT
Start: 2025-08-04 | End: 2025-09-03

## 2025-08-04 RX ORDER — FAMOTIDINE 10 MG/ML
20 INJECTION, SOLUTION INTRAVENOUS ONCE
Status: COMPLETED | OUTPATIENT
Start: 2025-08-04 | End: 2025-08-04

## 2025-08-04 RX ORDER — MAGNESIUM HYDROXIDE/ALUMINUM HYDROXICE/SIMETHICONE 120; 1200; 1200 MG/30ML; MG/30ML; MG/30ML
30 SUSPENSION ORAL ONCE
Status: COMPLETED | OUTPATIENT
Start: 2025-08-04 | End: 2025-08-04

## 2025-08-04 RX ORDER — SUCRALFATE 1 G/1
1 TABLET ORAL
Qty: 40 TABLET | Refills: 0 | Status: SHIPPED | OUTPATIENT
Start: 2025-08-04 | End: 2025-08-14

## 2025-08-06 LAB
ATRIAL RATE: 60 BPM
P AXIS: 72 DEGREES
P-R INTERVAL: 194 MS
Q-T INTERVAL: 414 MS
QRS DURATION: 90 MS
QTC CALCULATION (BEZET): 414 MS
R AXIS: 43 DEGREES
T AXIS: 67 DEGREES
VENTRICULAR RATE: 60 BPM

## 2025-08-23 ENCOUNTER — ORDER TRANSCRIPTION (OUTPATIENT)
Dept: ADMINISTRATIVE | Facility: HOSPITAL | Age: 70
End: 2025-08-23
